# Patient Record
Sex: FEMALE | Race: BLACK OR AFRICAN AMERICAN | NOT HISPANIC OR LATINO | Employment: OTHER | RURAL
[De-identification: names, ages, dates, MRNs, and addresses within clinical notes are randomized per-mention and may not be internally consistent; named-entity substitution may affect disease eponyms.]

---

## 2018-10-03 ENCOUNTER — HISTORICAL (OUTPATIENT)
Dept: ADMINISTRATIVE | Facility: HOSPITAL | Age: 53
End: 2018-10-03

## 2018-10-03 LAB — PAP SMEAR: NORMAL

## 2018-10-05 LAB
LAB AP CLINICAL INFORMATION: NORMAL
LAB AP GENERAL CAT - HISTORICAL: NORMAL
LAB AP INTERPRETATION/RESULT - HISTORICAL: NEGATIVE
LAB AP SPECIMEN ADEQUACY - HISTORICAL: NORMAL
LAB AP SPECIMEN SUBMITTED - HISTORICAL: NORMAL

## 2020-06-09 ENCOUNTER — HISTORICAL (OUTPATIENT)
Dept: ADMINISTRATIVE | Facility: HOSPITAL | Age: 55
End: 2020-06-09

## 2020-07-22 ENCOUNTER — HISTORICAL (OUTPATIENT)
Dept: ADMINISTRATIVE | Facility: HOSPITAL | Age: 55
End: 2020-07-22

## 2020-09-03 ENCOUNTER — HISTORICAL (OUTPATIENT)
Dept: ADMINISTRATIVE | Facility: HOSPITAL | Age: 55
End: 2020-09-03

## 2020-09-03 LAB — POTASSIUM SERPL-SCNC: 3.4 MMOL/L (ref 3.5–5.1)

## 2020-10-18 ENCOUNTER — HISTORICAL (OUTPATIENT)
Dept: ADMINISTRATIVE | Facility: HOSPITAL | Age: 55
End: 2020-10-18

## 2020-10-18 LAB
BILIRUB UR QL STRIP: NEGATIVE MG/DL
CLARITY UR: CLEAR
COLOR UR: ABNORMAL
GLUCOSE UR STRIP-MCNC: NEGATIVE MG/DL
KETONES UR STRIP-SCNC: NEGATIVE MG/DL
LEUKOCYTE ESTERASE UR QL STRIP: NEGATIVE LEU/UL
NITRITE UR QL STRIP: NEGATIVE
PH UR STRIP: 6 PH UNITS (ref 5–8)
PROT UR QL STRIP: NEGATIVE MG/DL
RBC # UR STRIP: NEGATIVE ERY/UL
SP GR UR STRIP: 1.01 (ref 1–1.03)
UROBILINOGEN UR STRIP-ACNC: 0.2 EU/DL

## 2020-10-27 ENCOUNTER — HISTORICAL (OUTPATIENT)
Dept: ADMINISTRATIVE | Facility: HOSPITAL | Age: 55
End: 2020-10-27

## 2020-10-27 LAB
ALBUMIN SERPL BCP-MCNC: 4.1 G/DL (ref 3.5–5)
ALBUMIN/GLOB SERPL: 0.9 {RATIO}
ALP SERPL-CCNC: 105 U/L (ref 41–108)
ALT SERPL W P-5'-P-CCNC: 26 U/L (ref 13–56)
AMYLASE SERPL-CCNC: 72 U/L (ref 25–115)
ANION GAP SERPL CALCULATED.3IONS-SCNC: 9 MMOL/L
AST SERPL W P-5'-P-CCNC: 22 U/L (ref 15–37)
BACTERIA #/AREA URNS HPF: ABNORMAL /HPF
BASOPHILS # BLD AUTO: 0.06 X10E3/UL (ref 0–0.2)
BASOPHILS NFR BLD AUTO: 0.6 % (ref 0–1)
BILIRUB SERPL-MCNC: 0.6 MG/DL (ref 0–1.2)
BILIRUB UR QL STRIP: NEGATIVE MG/DL
BUN SERPL-MCNC: 11 MG/DL (ref 7–18)
BUN/CREAT SERPL: 8.5
CALCIUM SERPL-MCNC: 9.8 MG/DL (ref 8.5–10.1)
CHLORIDE SERPL-SCNC: 99 MMOL/L (ref 98–107)
CLARITY UR: CLEAR
CLARITY UR: CLEAR
CO2 SERPL-SCNC: 36 MMOL/L (ref 21–32)
COLOR UR: ABNORMAL
COLOR UR: ABNORMAL
CREAT SERPL-MCNC: 1.29 MG/DL (ref 0.55–1.02)
EOSINOPHIL # BLD AUTO: 0.02 X10E3/UL (ref 0–0.5)
EOSINOPHIL NFR BLD AUTO: 0.2 % (ref 1–4)
ERYTHROCYTE [DISTWIDTH] IN BLOOD BY AUTOMATED COUNT: 13.9 % (ref 11.5–14.5)
GLOBULIN SER-MCNC: 4.5 G/DL (ref 2–4)
GLUCOSE SERPL-MCNC: 112 MG/DL (ref 74–106)
GLUCOSE UR STRIP-MCNC: NEGATIVE MG/DL
HCT VFR BLD AUTO: 42.6 % (ref 38–47)
HGB BLD-MCNC: 14.4 G/DL (ref 12–16)
IMM GRANULOCYTES # BLD AUTO: 0.01 X10E3/UL (ref 0–0.04)
IMM GRANULOCYTES NFR BLD: 0.1 % (ref 0–0.4)
KETONES UR STRIP-SCNC: NEGATIVE MG/DL
LEUKOCYTE ESTERASE UR QL STRIP: ABNORMAL LEU/UL
LIPASE SERPL-CCNC: 45 U/L (ref 73–393)
LYMPHOCYTES # BLD AUTO: 4.12 X10E3/UL (ref 1–4.8)
LYMPHOCYTES NFR BLD AUTO: 40.3 % (ref 27–41)
MCH RBC QN AUTO: 29.7 PG (ref 27–31)
MCHC RBC AUTO-ENTMCNC: 33.8 G/DL (ref 32–36)
MCV RBC AUTO: 87.8 FL (ref 80–96)
MONOCYTES # BLD AUTO: 0.52 X10E3/UL (ref 0–0.8)
MONOCYTES NFR BLD AUTO: 5.1 % (ref 2–6)
MPC BLD CALC-MCNC: 10 FL (ref 9.4–12.4)
MUCOUS THREADS #/AREA URNS HPF: ABNORMAL /HPF
NEUTROPHILS # BLD AUTO: 5.5 X10E3/UL (ref 1.8–7.7)
NEUTROPHILS NFR BLD AUTO: 53.7 % (ref 53–65)
NITRITE UR QL STRIP: NEGATIVE
NRBC # BLD AUTO: 0 X10E3/UL (ref 0–0)
NRBC, AUTO (.00): 0 /100 (ref 0–0)
PH UR STRIP: 7.5 PH UNITS (ref 5–8)
PLATELET # BLD AUTO: 409 X10E3/UL (ref 150–400)
POTASSIUM SERPL-SCNC: 3.5 MMOL/L (ref 3.5–5.1)
PROT SERPL-MCNC: 8.6 G/DL (ref 6.4–8.2)
PROT UR QL STRIP: NEGATIVE MG/DL
RBC # BLD AUTO: 4.85 X10E6/UL (ref 4.2–5.4)
RBC # UR STRIP: NEGATIVE ERY/UL
RBC #/AREA URNS HPF: ABNORMAL /HPF (ref 0–3)
SODIUM SERPL-SCNC: 140 MMOL/L (ref 136–145)
SP GR UR STRIP: 1.01 (ref 1–1.03)
SQUAMOUS #/AREA URNS LPF: ABNORMAL /LPF
UROBILINOGEN UR STRIP-ACNC: 0.2 EU/DL
WBC # BLD AUTO: 10.23 X10E3/UL (ref 4.5–11)
WBC #/AREA URNS HPF: ABNORMAL /HPF (ref 0–5)

## 2020-12-18 ENCOUNTER — HISTORICAL (OUTPATIENT)
Dept: ADMINISTRATIVE | Facility: HOSPITAL | Age: 55
End: 2020-12-18

## 2020-12-23 LAB
INSULIN SERPL-ACNC: NORMAL U[IU]/ML
LAB AP CLINICAL INFORMATION: NORMAL
LAB AP GENERAL CAT - HISTORICAL: NORMAL
LAB AP INTERPRETATION/RESULT - HISTORICAL: NEGATIVE
LAB AP SPECIMEN ADEQUACY - HISTORICAL: NORMAL
LAB AP SPECIMEN SUBMITTED - HISTORICAL: NORMAL

## 2021-03-09 ENCOUNTER — HISTORICAL (OUTPATIENT)
Dept: ADMINISTRATIVE | Facility: HOSPITAL | Age: 56
End: 2021-03-09

## 2021-05-10 DIAGNOSIS — Z11.59 SPECIAL SCREENING EXAMINATION FOR VIRAL DISEASE: Primary | ICD-10-CM

## 2021-05-10 DIAGNOSIS — Z12.11 COLON CANCER SCREENING: Primary | ICD-10-CM

## 2021-05-21 ENCOUNTER — ANESTHESIA (OUTPATIENT)
Dept: GASTROENTEROLOGY | Facility: HOSPITAL | Age: 56
End: 2021-05-21
Payer: COMMERCIAL

## 2021-05-21 ENCOUNTER — HOSPITAL ENCOUNTER (OUTPATIENT)
Dept: GASTROENTEROLOGY | Facility: HOSPITAL | Age: 56
Discharge: HOME | End: 2021-05-21
Attending: INTERNAL MEDICINE
Payer: COMMERCIAL

## 2021-05-21 ENCOUNTER — ANESTHESIA EVENT (OUTPATIENT)
Dept: GASTROENTEROLOGY | Facility: HOSPITAL | Age: 56
End: 2021-05-21
Payer: COMMERCIAL

## 2021-05-21 VITALS
RESPIRATION RATE: 14 BRPM | BODY MASS INDEX: 32.18 KG/M2 | SYSTOLIC BLOOD PRESSURE: 145 MMHG | OXYGEN SATURATION: 100 % | HEART RATE: 74 BPM | TEMPERATURE: 97 F | WEIGHT: 205 LBS | DIASTOLIC BLOOD PRESSURE: 90 MMHG | HEIGHT: 67 IN

## 2021-05-21 DIAGNOSIS — Z12.11 SCREENING FOR MALIGNANT NEOPLASM OF COLON: ICD-10-CM

## 2021-05-21 DIAGNOSIS — Z12.11 COLON CANCER SCREENING: ICD-10-CM

## 2021-05-21 DIAGNOSIS — K63.5 POLYP OF DESCENDING COLON, UNSPECIFIED TYPE: ICD-10-CM

## 2021-05-21 DIAGNOSIS — K62.1 RECTAL POLYP: ICD-10-CM

## 2021-05-21 PROCEDURE — 63600175 PHARM REV CODE 636 W HCPCS: Performed by: NURSE ANESTHETIST, CERTIFIED REGISTERED

## 2021-05-21 PROCEDURE — 27000284 HC CANNULA NASAL: Performed by: NURSE ANESTHETIST, CERTIFIED REGISTERED

## 2021-05-21 PROCEDURE — 45385 COLONOSCOPY W/LESION REMOVAL: CPT | Mod: PT

## 2021-05-21 PROCEDURE — 27201423 OPTIME MED/SURG SUP & DEVICES STERILE SUPPLY

## 2021-05-21 PROCEDURE — 88305 TISSUE EXAM BY PATHOLOGIST: CPT | Mod: 26,,, | Performed by: PATHOLOGY

## 2021-05-21 PROCEDURE — 88305 TISSUE EXAM BY PATHOLOGIST: CPT | Mod: SUR | Performed by: INTERNAL MEDICINE

## 2021-05-21 PROCEDURE — 37000009 HC ANESTHESIA EA ADD 15 MINS

## 2021-05-21 PROCEDURE — D9220A PRA ANESTHESIA: ICD-10-PCS | Mod: PT,,, | Performed by: NURSE ANESTHETIST, CERTIFIED REGISTERED

## 2021-05-21 PROCEDURE — 37000008 HC ANESTHESIA 1ST 15 MINUTES

## 2021-05-21 PROCEDURE — 25000003 PHARM REV CODE 250: Performed by: NURSE ANESTHETIST, CERTIFIED REGISTERED

## 2021-05-21 PROCEDURE — D9220A PRA ANESTHESIA: Mod: PT,,, | Performed by: NURSE ANESTHETIST, CERTIFIED REGISTERED

## 2021-05-21 PROCEDURE — 88305 SURGICAL PATHOLOGY: ICD-10-PCS | Mod: 26,,, | Performed by: PATHOLOGY

## 2021-05-21 RX ORDER — PHENYLEPHRINE HYDROCHLORIDE 10 MG/ML
INJECTION INTRAVENOUS
Status: DISCONTINUED | OUTPATIENT
Start: 2021-05-21 | End: 2021-05-21

## 2021-05-21 RX ORDER — LIDOCAINE HYDROCHLORIDE 20 MG/ML
INJECTION, SOLUTION EPIDURAL; INFILTRATION; INTRACAUDAL; PERINEURAL
Status: DISCONTINUED | OUTPATIENT
Start: 2021-05-21 | End: 2021-05-21

## 2021-05-21 RX ORDER — PROPOFOL 10 MG/ML
INJECTION, EMULSION INTRAVENOUS
Status: DISCONTINUED | OUTPATIENT
Start: 2021-05-21 | End: 2021-05-21

## 2021-05-21 RX ORDER — SODIUM CHLORIDE 0.9 % (FLUSH) 0.9 %
10 SYRINGE (ML) INJECTION
Status: DISCONTINUED | OUTPATIENT
Start: 2021-05-21 | End: 2021-05-22 | Stop reason: HOSPADM

## 2021-05-21 RX ORDER — LISINOPRIL 20 MG/1
20 TABLET ORAL DAILY
COMMUNITY
End: 2022-01-14

## 2021-05-21 RX ORDER — ARIPIPRAZOLE 10 MG/1
10 TABLET ORAL DAILY
COMMUNITY
End: 2022-03-07 | Stop reason: DRUGHIGH

## 2021-05-21 RX ORDER — BUSPIRONE HYDROCHLORIDE 15 MG/1
15 TABLET ORAL 2 TIMES DAILY
COMMUNITY
End: 2022-03-07 | Stop reason: DRUGHIGH

## 2021-05-21 RX ORDER — LEVOTHYROXINE SODIUM 75 UG/1
75 TABLET ORAL
COMMUNITY
End: 2021-05-26 | Stop reason: DRUGHIGH

## 2021-05-21 RX ADMIN — PHENYLEPHRINE HYDROCHLORIDE 100 MCG: 10 INJECTION INTRAVENOUS at 12:05

## 2021-05-21 RX ADMIN — PROPOFOL 50 MG: 10 INJECTION, EMULSION INTRAVENOUS at 12:05

## 2021-05-21 RX ADMIN — SODIUM CHLORIDE: 9 INJECTION, SOLUTION INTRAVENOUS at 12:05

## 2021-05-21 RX ADMIN — LIDOCAINE HYDROCHLORIDE 100 MG: 20 INJECTION, SOLUTION INTRAVENOUS at 12:05

## 2021-05-24 ENCOUNTER — OFFICE VISIT (OUTPATIENT)
Dept: FAMILY MEDICINE | Facility: CLINIC | Age: 56
End: 2021-05-24
Payer: MEDICARE

## 2021-05-24 VITALS
WEIGHT: 213 LBS | HEIGHT: 67 IN | HEART RATE: 64 BPM | RESPIRATION RATE: 18 BRPM | SYSTOLIC BLOOD PRESSURE: 108 MMHG | DIASTOLIC BLOOD PRESSURE: 69 MMHG | TEMPERATURE: 98 F | OXYGEN SATURATION: 100 % | BODY MASS INDEX: 33.43 KG/M2

## 2021-05-24 DIAGNOSIS — R23.2 HOT FLASHES: Primary | ICD-10-CM

## 2021-05-24 DIAGNOSIS — E03.9 HYPOTHYROIDISM, UNSPECIFIED TYPE: ICD-10-CM

## 2021-05-24 LAB
ESTROGEN SERPL-MCNC: NORMAL PG/ML
LAB AP GROSS DESCRIPTION: NORMAL
LAB AP LABORATORY NOTES: NORMAL
T3RU NFR SERPL: NORMAL %
TSH SERPL DL<=0.005 MIU/L-ACNC: 5.87 UIU/ML (ref 0.36–3.74)

## 2021-05-24 PROCEDURE — 84443 ASSAY THYROID STIM HORMONE: CPT | Mod: GZ,,, | Performed by: CLINICAL MEDICAL LABORATORY

## 2021-05-24 PROCEDURE — 3008F BODY MASS INDEX DOCD: CPT | Mod: ,,, | Performed by: FAMILY MEDICINE

## 2021-05-24 PROCEDURE — 3008F PR BODY MASS INDEX (BMI) DOCUMENTED: ICD-10-PCS | Mod: ,,, | Performed by: FAMILY MEDICINE

## 2021-05-24 PROCEDURE — 84443 TSH: ICD-10-PCS | Mod: GZ,,, | Performed by: CLINICAL MEDICAL LABORATORY

## 2021-05-24 PROCEDURE — 99213 PR OFFICE/OUTPT VISIT, EST, LEVL III, 20-29 MIN: ICD-10-PCS | Mod: ,,, | Performed by: FAMILY MEDICINE

## 2021-05-24 PROCEDURE — 99213 OFFICE O/P EST LOW 20 MIN: CPT | Mod: ,,, | Performed by: FAMILY MEDICINE

## 2021-05-24 RX ORDER — AMITRIPTYLINE HYDROCHLORIDE 50 MG/1
50 TABLET, FILM COATED ORAL NIGHTLY
COMMUNITY
Start: 2021-04-13 | End: 2022-03-07 | Stop reason: DRUGHIGH

## 2021-05-24 RX ORDER — LISINOPRIL AND HYDROCHLOROTHIAZIDE 20; 25 MG/1; MG/1
1 TABLET ORAL DAILY
COMMUNITY
Start: 2021-04-08 | End: 2022-01-14 | Stop reason: SDUPTHER

## 2021-05-24 RX ORDER — TRAZODONE HYDROCHLORIDE 100 MG/1
2 TABLET ORAL NIGHTLY PRN
COMMUNITY
End: 2022-03-22 | Stop reason: SDUPTHER

## 2021-05-25 PROBLEM — E03.9 HYPOTHYROIDISM: Status: ACTIVE | Noted: 2021-05-25

## 2021-05-25 PROBLEM — I10 ESSENTIAL HYPERTENSION: Status: ACTIVE | Noted: 2021-05-25

## 2021-05-26 ENCOUNTER — TELEPHONE (OUTPATIENT)
Dept: GASTROENTEROLOGY | Facility: CLINIC | Age: 56
End: 2021-05-26

## 2021-05-26 DIAGNOSIS — E03.9 HYPOTHYROIDISM, UNSPECIFIED TYPE: Primary | ICD-10-CM

## 2021-05-26 RX ORDER — LEVOTHYROXINE SODIUM 88 UG/1
88 TABLET ORAL
Qty: 30 TABLET | Refills: 11 | Status: SHIPPED | OUTPATIENT
Start: 2021-05-26 | End: 2021-09-16 | Stop reason: SDUPTHER

## 2021-07-01 ENCOUNTER — HOSPITAL ENCOUNTER (OUTPATIENT)
Dept: RADIOLOGY | Facility: HOSPITAL | Age: 56
Discharge: HOME | End: 2021-07-01
Attending: FAMILY MEDICINE
Payer: MEDICARE

## 2021-07-01 ENCOUNTER — OFFICE VISIT (OUTPATIENT)
Dept: FAMILY MEDICINE | Facility: CLINIC | Age: 56
End: 2021-07-01
Payer: COMMERCIAL

## 2021-07-01 VITALS
SYSTOLIC BLOOD PRESSURE: 131 MMHG | DIASTOLIC BLOOD PRESSURE: 89 MMHG | RESPIRATION RATE: 16 BRPM | TEMPERATURE: 97 F | HEART RATE: 96 BPM | WEIGHT: 204 LBS | BODY MASS INDEX: 33.99 KG/M2 | HEIGHT: 65 IN

## 2021-07-01 DIAGNOSIS — M15.9 OSTEOARTHRITIS OF MULTIPLE JOINTS, UNSPECIFIED OSTEOARTHRITIS TYPE: ICD-10-CM

## 2021-07-01 DIAGNOSIS — M54.9 DORSALGIA, UNSPECIFIED: ICD-10-CM

## 2021-07-01 DIAGNOSIS — M54.9 DORSALGIA, UNSPECIFIED: Primary | ICD-10-CM

## 2021-07-01 PROBLEM — G47.00 INSOMNIA: Status: ACTIVE | Noted: 2021-07-01

## 2021-07-01 PROCEDURE — 3008F PR BODY MASS INDEX (BMI) DOCUMENTED: ICD-10-PCS | Mod: CPTII,,, | Performed by: FAMILY MEDICINE

## 2021-07-01 PROCEDURE — 1125F PR PAIN SEVERITY QUANTIFIED, PAIN PRESENT: ICD-10-PCS | Mod: ,,, | Performed by: FAMILY MEDICINE

## 2021-07-01 PROCEDURE — 3008F BODY MASS INDEX DOCD: CPT | Mod: CPTII,,, | Performed by: FAMILY MEDICINE

## 2021-07-01 PROCEDURE — 99213 OFFICE O/P EST LOW 20 MIN: CPT | Mod: 25,,, | Performed by: FAMILY MEDICINE

## 2021-07-01 PROCEDURE — 72110 X-RAY EXAM L-2 SPINE 4/>VWS: CPT | Mod: TC

## 2021-07-01 PROCEDURE — 99213 PR OFFICE/OUTPT VISIT, EST, LEVL III, 20-29 MIN: ICD-10-PCS | Mod: 25,,, | Performed by: FAMILY MEDICINE

## 2021-07-01 PROCEDURE — 96372 THER/PROPH/DIAG INJ SC/IM: CPT | Mod: ,,, | Performed by: FAMILY MEDICINE

## 2021-07-01 PROCEDURE — 1125F AMNT PAIN NOTED PAIN PRSNT: CPT | Mod: ,,, | Performed by: FAMILY MEDICINE

## 2021-07-01 PROCEDURE — 96372 PR INJECTION,THERAP/PROPH/DIAG2ST, IM OR SUBCUT: ICD-10-PCS | Mod: ,,, | Performed by: FAMILY MEDICINE

## 2021-07-01 RX ORDER — NAPROXEN 500 MG/1
500 TABLET ORAL 2 TIMES DAILY
Qty: 60 TABLET | Refills: 1 | Status: SHIPPED | OUTPATIENT
Start: 2021-07-01 | End: 2021-09-16 | Stop reason: SDUPTHER

## 2021-07-01 RX ORDER — CYCLOBENZAPRINE HCL 10 MG
10 TABLET ORAL 3 TIMES DAILY PRN
Qty: 60 TABLET | Refills: 1 | Status: SHIPPED | OUTPATIENT
Start: 2021-07-01 | End: 2021-07-11

## 2021-07-01 RX ORDER — DEXAMETHASONE SODIUM PHOSPHATE 4 MG/ML
4 INJECTION, SOLUTION INTRA-ARTICULAR; INTRALESIONAL; INTRAMUSCULAR; INTRAVENOUS; SOFT TISSUE
Status: COMPLETED | OUTPATIENT
Start: 2021-07-01 | End: 2021-07-01

## 2021-07-01 RX ORDER — METHYLPREDNISOLONE ACETATE 40 MG/ML
40 INJECTION, SUSPENSION INTRA-ARTICULAR; INTRALESIONAL; INTRAMUSCULAR; SOFT TISSUE
Status: COMPLETED | OUTPATIENT
Start: 2021-07-01 | End: 2021-07-01

## 2021-07-01 RX ADMIN — DEXAMETHASONE SODIUM PHOSPHATE 4 MG: 4 INJECTION, SOLUTION INTRA-ARTICULAR; INTRALESIONAL; INTRAMUSCULAR; INTRAVENOUS; SOFT TISSUE at 03:07

## 2021-07-01 RX ADMIN — METHYLPREDNISOLONE ACETATE 40 MG: 40 INJECTION, SUSPENSION INTRA-ARTICULAR; INTRALESIONAL; INTRAMUSCULAR; SOFT TISSUE at 03:07

## 2021-07-04 PROBLEM — M15.9 OSTEOARTHRITIS OF MULTIPLE JOINTS: Status: ACTIVE | Noted: 2021-07-04

## 2021-07-04 PROBLEM — F41.9 ANXIETY: Status: ACTIVE | Noted: 2021-07-04

## 2021-08-26 ENCOUNTER — OFFICE VISIT (OUTPATIENT)
Dept: OBSTETRICS AND GYNECOLOGY | Facility: CLINIC | Age: 56
End: 2021-08-26
Payer: MEDICARE

## 2021-08-26 VITALS
RESPIRATION RATE: 19 BRPM | HEIGHT: 67 IN | WEIGHT: 216 LBS | DIASTOLIC BLOOD PRESSURE: 94 MMHG | BODY MASS INDEX: 33.9 KG/M2 | SYSTOLIC BLOOD PRESSURE: 152 MMHG | HEART RATE: 75 BPM

## 2021-08-26 DIAGNOSIS — R61 NIGHT SWEATS: Primary | ICD-10-CM

## 2021-08-26 DIAGNOSIS — R23.2 HOT FLASHES: ICD-10-CM

## 2021-08-26 PROCEDURE — 1159F MED LIST DOCD IN RCRD: CPT | Mod: CPTII,,, | Performed by: OBSTETRICS & GYNECOLOGY

## 2021-08-26 PROCEDURE — 99214 OFFICE O/P EST MOD 30 MIN: CPT | Mod: ,,, | Performed by: OBSTETRICS & GYNECOLOGY

## 2021-08-26 PROCEDURE — 1159F PR MEDICATION LIST DOCUMENTED IN MEDICAL RECORD: ICD-10-PCS | Mod: CPTII,,, | Performed by: OBSTETRICS & GYNECOLOGY

## 2021-08-26 PROCEDURE — 3080F PR MOST RECENT DIASTOLIC BLOOD PRESSURE >= 90 MM HG: ICD-10-PCS | Mod: CPTII,,, | Performed by: OBSTETRICS & GYNECOLOGY

## 2021-08-26 PROCEDURE — 3008F PR BODY MASS INDEX (BMI) DOCUMENTED: ICD-10-PCS | Mod: CPTII,,, | Performed by: OBSTETRICS & GYNECOLOGY

## 2021-08-26 PROCEDURE — 1160F RVW MEDS BY RX/DR IN RCRD: CPT | Mod: CPTII,,, | Performed by: OBSTETRICS & GYNECOLOGY

## 2021-08-26 PROCEDURE — 4010F PR ACE/ARB THEARPY RXD/TAKEN: ICD-10-PCS | Mod: CPTII,,, | Performed by: OBSTETRICS & GYNECOLOGY

## 2021-08-26 PROCEDURE — 99214 PR OFFICE/OUTPT VISIT, EST, LEVL IV, 30-39 MIN: ICD-10-PCS | Mod: ,,, | Performed by: OBSTETRICS & GYNECOLOGY

## 2021-08-26 PROCEDURE — 3008F BODY MASS INDEX DOCD: CPT | Mod: CPTII,,, | Performed by: OBSTETRICS & GYNECOLOGY

## 2021-08-26 PROCEDURE — 3077F SYST BP >= 140 MM HG: CPT | Mod: CPTII,,, | Performed by: OBSTETRICS & GYNECOLOGY

## 2021-08-26 PROCEDURE — 3080F DIAST BP >= 90 MM HG: CPT | Mod: CPTII,,, | Performed by: OBSTETRICS & GYNECOLOGY

## 2021-08-26 PROCEDURE — 1160F PR REVIEW ALL MEDS BY PRESCRIBER/CLIN PHARMACIST DOCUMENTED: ICD-10-PCS | Mod: CPTII,,, | Performed by: OBSTETRICS & GYNECOLOGY

## 2021-08-26 PROCEDURE — 4010F ACE/ARB THERAPY RXD/TAKEN: CPT | Mod: CPTII,,, | Performed by: OBSTETRICS & GYNECOLOGY

## 2021-08-26 PROCEDURE — 3077F PR MOST RECENT SYSTOLIC BLOOD PRESSURE >= 140 MM HG: ICD-10-PCS | Mod: CPTII,,, | Performed by: OBSTETRICS & GYNECOLOGY

## 2021-08-26 RX ORDER — PAROXETINE 7.5 MG/1
7.5 CAPSULE ORAL DAILY
Qty: 30 CAPSULE | Refills: 11 | Status: SHIPPED | OUTPATIENT
Start: 2021-08-26 | End: 2022-01-14

## 2021-08-30 ENCOUNTER — TELEPHONE (OUTPATIENT)
Dept: OBSTETRICS AND GYNECOLOGY | Facility: CLINIC | Age: 56
End: 2021-08-30

## 2021-09-09 ENCOUNTER — TELEPHONE (OUTPATIENT)
Dept: OBSTETRICS AND GYNECOLOGY | Facility: CLINIC | Age: 56
End: 2021-09-09

## 2021-09-16 ENCOUNTER — OFFICE VISIT (OUTPATIENT)
Dept: FAMILY MEDICINE | Facility: CLINIC | Age: 56
End: 2021-09-16
Payer: MEDICARE

## 2021-09-16 ENCOUNTER — HOSPITAL ENCOUNTER (OUTPATIENT)
Dept: RADIOLOGY | Facility: HOSPITAL | Age: 56
Discharge: HOME | End: 2021-09-16
Attending: FAMILY MEDICINE
Payer: MEDICARE

## 2021-09-16 VITALS
TEMPERATURE: 98 F | BODY MASS INDEX: 35.66 KG/M2 | RESPIRATION RATE: 18 BRPM | DIASTOLIC BLOOD PRESSURE: 103 MMHG | OXYGEN SATURATION: 98 % | SYSTOLIC BLOOD PRESSURE: 171 MMHG | HEIGHT: 67 IN | WEIGHT: 227.19 LBS | HEART RATE: 83 BPM

## 2021-09-16 DIAGNOSIS — R06.02 SHORTNESS OF BREATH: ICD-10-CM

## 2021-09-16 DIAGNOSIS — M54.9 DORSALGIA, UNSPECIFIED: ICD-10-CM

## 2021-09-16 DIAGNOSIS — I10 ESSENTIAL HYPERTENSION: ICD-10-CM

## 2021-09-16 DIAGNOSIS — E03.9 HYPOTHYROIDISM, UNSPECIFIED TYPE: Primary | ICD-10-CM

## 2021-09-16 PROCEDURE — 1159F PR MEDICATION LIST DOCUMENTED IN MEDICAL RECORD: ICD-10-PCS | Mod: ,,, | Performed by: FAMILY MEDICINE

## 2021-09-16 PROCEDURE — 99214 PR OFFICE/OUTPT VISIT, EST, LEVL IV, 30-39 MIN: ICD-10-PCS | Mod: 25,,, | Performed by: FAMILY MEDICINE

## 2021-09-16 PROCEDURE — 3077F PR MOST RECENT SYSTOLIC BLOOD PRESSURE >= 140 MM HG: ICD-10-PCS | Mod: ,,, | Performed by: FAMILY MEDICINE

## 2021-09-16 PROCEDURE — G0008 ADMIN INFLUENZA VIRUS VAC: HCPCS | Mod: ,,, | Performed by: FAMILY MEDICINE

## 2021-09-16 PROCEDURE — 4010F ACE/ARB THERAPY RXD/TAKEN: CPT | Mod: ,,, | Performed by: FAMILY MEDICINE

## 2021-09-16 PROCEDURE — 3077F SYST BP >= 140 MM HG: CPT | Mod: ,,, | Performed by: FAMILY MEDICINE

## 2021-09-16 PROCEDURE — 3080F PR MOST RECENT DIASTOLIC BLOOD PRESSURE >= 90 MM HG: ICD-10-PCS | Mod: ,,, | Performed by: FAMILY MEDICINE

## 2021-09-16 PROCEDURE — 99214 OFFICE O/P EST MOD 30 MIN: CPT | Mod: 25,,, | Performed by: FAMILY MEDICINE

## 2021-09-16 PROCEDURE — 4010F PR ACE/ARB THEARPY RXD/TAKEN: ICD-10-PCS | Mod: ,,, | Performed by: FAMILY MEDICINE

## 2021-09-16 PROCEDURE — 90686 IIV4 VACC NO PRSV 0.5 ML IM: CPT | Mod: ,,, | Performed by: FAMILY MEDICINE

## 2021-09-16 PROCEDURE — G0008 FLU VACCINE (QUAD) GREATER THAN OR EQUAL TO 3YO PRESERVATIVE FREE IM: ICD-10-PCS | Mod: ,,, | Performed by: FAMILY MEDICINE

## 2021-09-16 PROCEDURE — 90686 FLU VACCINE (QUAD) GREATER THAN OR EQUAL TO 3YO PRESERVATIVE FREE IM: ICD-10-PCS | Mod: ,,, | Performed by: FAMILY MEDICINE

## 2021-09-16 PROCEDURE — 3008F BODY MASS INDEX DOCD: CPT | Mod: ,,, | Performed by: FAMILY MEDICINE

## 2021-09-16 PROCEDURE — 71046 X-RAY EXAM CHEST 2 VIEWS: CPT | Mod: TC

## 2021-09-16 PROCEDURE — 1159F MED LIST DOCD IN RCRD: CPT | Mod: ,,, | Performed by: FAMILY MEDICINE

## 2021-09-16 PROCEDURE — 3008F PR BODY MASS INDEX (BMI) DOCUMENTED: ICD-10-PCS | Mod: ,,, | Performed by: FAMILY MEDICINE

## 2021-09-16 PROCEDURE — 3080F DIAST BP >= 90 MM HG: CPT | Mod: ,,, | Performed by: FAMILY MEDICINE

## 2021-09-16 RX ORDER — HYDRALAZINE HYDROCHLORIDE 25 MG/1
25 TABLET, FILM COATED ORAL 3 TIMES DAILY
COMMUNITY
End: 2022-07-14 | Stop reason: SDUPTHER

## 2021-09-16 RX ORDER — NAPROXEN 500 MG/1
500 TABLET ORAL 2 TIMES DAILY
Qty: 60 TABLET | Refills: 5 | Status: SHIPPED | OUTPATIENT
Start: 2021-09-16 | End: 2022-01-14 | Stop reason: SDUPTHER

## 2021-09-16 RX ORDER — LISINOPRIL 40 MG/1
40 TABLET ORAL DAILY
COMMUNITY
End: 2022-01-14

## 2021-09-16 RX ORDER — AMLODIPINE BESYLATE 5 MG/1
5 TABLET ORAL DAILY
Qty: 30 TABLET | Refills: 2 | Status: SHIPPED | OUTPATIENT
Start: 2021-09-16 | End: 2022-01-14

## 2021-09-16 RX ORDER — LEVOTHYROXINE SODIUM 88 UG/1
88 TABLET ORAL
Qty: 30 TABLET | Refills: 5 | Status: SHIPPED | OUTPATIENT
Start: 2021-09-16 | End: 2022-04-28 | Stop reason: SDUPTHER

## 2021-09-27 ENCOUNTER — CLINICAL SUPPORT (OUTPATIENT)
Dept: PULMONOLOGY | Facility: HOSPITAL | Age: 56
End: 2021-09-27
Attending: FAMILY MEDICINE
Payer: MEDICARE

## 2021-09-27 DIAGNOSIS — R06.02 SHORTNESS OF BREATH: ICD-10-CM

## 2021-09-27 PROCEDURE — 94060 EVALUATION OF WHEEZING: CPT | Mod: 26,,, | Performed by: INTERNAL MEDICINE

## 2021-09-27 PROCEDURE — 94060 EVALUATION OF WHEEZING: CPT

## 2021-09-27 PROCEDURE — 94727 GAS DIL/WSHOT DETER LNG VOL: CPT

## 2021-09-27 PROCEDURE — 94726 PLETHYSMOGRAPHY LUNG VOLUMES: CPT

## 2021-09-27 PROCEDURE — 94729 DIFFUSING CAPACITY: CPT | Mod: 26,,, | Performed by: INTERNAL MEDICINE

## 2021-09-27 PROCEDURE — 94729 PR C02/MEMBANE DIFFUSE CAPACITY: ICD-10-PCS | Mod: 26,,, | Performed by: INTERNAL MEDICINE

## 2021-09-27 PROCEDURE — 94060 PR EVAL OF BRONCHOSPASM: ICD-10-PCS | Mod: 26,,, | Performed by: INTERNAL MEDICINE

## 2021-09-27 PROCEDURE — 94727 GAS DIL/WSHOT DETER LNG VOL: CPT | Mod: 26,,, | Performed by: INTERNAL MEDICINE

## 2021-09-27 PROCEDURE — 94729 DIFFUSING CAPACITY: CPT

## 2021-09-27 PROCEDURE — 94727 PR PULM FUNCTION TEST BY GAS: ICD-10-PCS | Mod: 26,,, | Performed by: INTERNAL MEDICINE

## 2022-01-14 ENCOUNTER — OFFICE VISIT (OUTPATIENT)
Dept: FAMILY MEDICINE | Facility: CLINIC | Age: 57
End: 2022-01-14
Payer: MEDICARE

## 2022-01-14 VITALS
RESPIRATION RATE: 18 BRPM | BODY MASS INDEX: 35.41 KG/M2 | SYSTOLIC BLOOD PRESSURE: 124 MMHG | WEIGHT: 225.63 LBS | HEIGHT: 67 IN | TEMPERATURE: 98 F | HEART RATE: 89 BPM | DIASTOLIC BLOOD PRESSURE: 83 MMHG | OXYGEN SATURATION: 98 %

## 2022-01-14 DIAGNOSIS — M54.9 DORSALGIA, UNSPECIFIED: ICD-10-CM

## 2022-01-14 DIAGNOSIS — I10 ESSENTIAL HYPERTENSION: Primary | ICD-10-CM

## 2022-01-14 DIAGNOSIS — R25.2 MUSCLE CRAMPS: ICD-10-CM

## 2022-01-14 DIAGNOSIS — E03.9 HYPOTHYROIDISM, UNSPECIFIED TYPE: ICD-10-CM

## 2022-01-14 LAB
ALBUMIN SERPL BCP-MCNC: 4.1 G/DL (ref 3.5–5)
ALBUMIN/GLOB SERPL: 0.8 {RATIO}
ALP SERPL-CCNC: 105 U/L (ref 41–108)
ALT SERPL W P-5'-P-CCNC: 36 U/L (ref 13–56)
ANION GAP SERPL CALCULATED.3IONS-SCNC: 8 MMOL/L (ref 7–16)
AST SERPL W P-5'-P-CCNC: 33 U/L (ref 15–37)
BASOPHILS # BLD AUTO: 0.06 K/UL (ref 0–0.2)
BASOPHILS NFR BLD AUTO: 0.5 % (ref 0–1)
BILIRUB SERPL-MCNC: 0.6 MG/DL (ref 0–1.2)
BUN SERPL-MCNC: 14 MG/DL (ref 7–18)
BUN/CREAT SERPL: 9 (ref 6–20)
CALCIUM SERPL-MCNC: 9.4 MG/DL (ref 8.5–10.1)
CHLORIDE SERPL-SCNC: 97 MMOL/L (ref 98–107)
CHOLEST SERPL-MCNC: 179 MG/DL (ref 0–200)
CHOLEST/HDLC SERPL: 5 {RATIO}
CO2 SERPL-SCNC: 31 MMOL/L (ref 21–32)
CREAT SERPL-MCNC: 1.57 MG/DL (ref 0.55–1.02)
DIFFERENTIAL METHOD BLD: ABNORMAL
EOSINOPHIL # BLD AUTO: 0.08 K/UL (ref 0–0.5)
EOSINOPHIL NFR BLD AUTO: 0.7 % (ref 1–4)
EOSINOPHIL NFR BLD MANUAL: 1 % (ref 1–4)
ERYTHROCYTE [DISTWIDTH] IN BLOOD BY AUTOMATED COUNT: 13.8 % (ref 11.5–14.5)
GLOBULIN SER-MCNC: 5.1 G/DL (ref 2–4)
GLUCOSE SERPL-MCNC: 88 MG/DL (ref 74–106)
HCT VFR BLD AUTO: 40.8 % (ref 38–47)
HDLC SERPL-MCNC: 36 MG/DL (ref 40–60)
HGB BLD-MCNC: 13.9 G/DL (ref 12–16)
IMM GRANULOCYTES # BLD AUTO: 0.02 K/UL (ref 0–0.04)
IMM GRANULOCYTES NFR BLD: 0.2 % (ref 0–0.4)
LDLC SERPL CALC-MCNC: 118 MG/DL
LDLC/HDLC SERPL: 3.3 {RATIO}
LYMPHOCYTES # BLD AUTO: 5.26 K/UL (ref 1–4.8)
LYMPHOCYTES NFR BLD AUTO: 47.3 % (ref 27–41)
LYMPHOCYTES NFR BLD MANUAL: 48 % (ref 27–41)
MAGNESIUM SERPL-MCNC: 2.2 MG/DL (ref 1.7–2.3)
MCH RBC QN AUTO: 29.6 PG (ref 27–31)
MCHC RBC AUTO-ENTMCNC: 34.1 G/DL (ref 32–36)
MCV RBC AUTO: 87 FL (ref 80–96)
MONOCYTES # BLD AUTO: 0.73 K/UL (ref 0–0.8)
MONOCYTES NFR BLD AUTO: 6.6 % (ref 2–6)
MONOCYTES NFR BLD MANUAL: 7 % (ref 2–6)
MPC BLD CALC-MCNC: 10.2 FL (ref 9.4–12.4)
NEUTROPHILS # BLD AUTO: 4.97 K/UL (ref 1.8–7.7)
NEUTROPHILS NFR BLD AUTO: 44.7 % (ref 53–65)
NEUTS SEG NFR BLD MANUAL: 44 % (ref 50–62)
NONHDLC SERPL-MCNC: 143 MG/DL
NRBC # BLD AUTO: 0 X10E3/UL
NRBC, AUTO (.00): 0 %
PLATELET # BLD AUTO: 408 K/UL (ref 150–400)
PLATELET MORPHOLOGY: NORMAL
POTASSIUM SERPL-SCNC: 3.9 MMOL/L (ref 3.5–5.1)
PROT SERPL-MCNC: 9.2 G/DL (ref 6.4–8.2)
RBC # BLD AUTO: 4.69 M/UL (ref 4.2–5.4)
RBC MORPH BLD: NORMAL
SODIUM SERPL-SCNC: 132 MMOL/L (ref 136–145)
TRIGL SERPL-MCNC: 127 MG/DL (ref 35–150)
TSH SERPL DL<=0.005 MIU/L-ACNC: 3.28 UIU/ML (ref 0.36–3.74)
VLDLC SERPL-MCNC: 25 MG/DL
WBC # BLD AUTO: 11.12 K/UL (ref 4.5–11)

## 2022-01-14 PROCEDURE — 80061 LIPID PANEL: CPT | Mod: ,,, | Performed by: CLINICAL MEDICAL LABORATORY

## 2022-01-14 PROCEDURE — 99214 PR OFFICE/OUTPT VISIT, EST, LEVL IV, 30-39 MIN: ICD-10-PCS | Mod: ,,, | Performed by: FAMILY MEDICINE

## 2022-01-14 PROCEDURE — 85025 CBC WITH DIFFERENTIAL: ICD-10-PCS | Mod: ,,, | Performed by: CLINICAL MEDICAL LABORATORY

## 2022-01-14 PROCEDURE — 3008F BODY MASS INDEX DOCD: CPT | Mod: ,,, | Performed by: FAMILY MEDICINE

## 2022-01-14 PROCEDURE — 3074F PR MOST RECENT SYSTOLIC BLOOD PRESSURE < 130 MM HG: ICD-10-PCS | Mod: ,,, | Performed by: FAMILY MEDICINE

## 2022-01-14 PROCEDURE — 4010F ACE/ARB THERAPY RXD/TAKEN: CPT | Mod: ,,, | Performed by: FAMILY MEDICINE

## 2022-01-14 PROCEDURE — 3079F DIAST BP 80-89 MM HG: CPT | Mod: ,,, | Performed by: FAMILY MEDICINE

## 2022-01-14 PROCEDURE — 83735 ASSAY OF MAGNESIUM: CPT | Mod: ,,, | Performed by: CLINICAL MEDICAL LABORATORY

## 2022-01-14 PROCEDURE — 83735 MAGNESIUM: ICD-10-PCS | Mod: ,,, | Performed by: CLINICAL MEDICAL LABORATORY

## 2022-01-14 PROCEDURE — 1159F PR MEDICATION LIST DOCUMENTED IN MEDICAL RECORD: ICD-10-PCS | Mod: ,,, | Performed by: FAMILY MEDICINE

## 2022-01-14 PROCEDURE — 85025 COMPLETE CBC W/AUTO DIFF WBC: CPT | Mod: ,,, | Performed by: CLINICAL MEDICAL LABORATORY

## 2022-01-14 PROCEDURE — 3008F PR BODY MASS INDEX (BMI) DOCUMENTED: ICD-10-PCS | Mod: ,,, | Performed by: FAMILY MEDICINE

## 2022-01-14 PROCEDURE — 80061 LIPID PANEL: ICD-10-PCS | Mod: ,,, | Performed by: CLINICAL MEDICAL LABORATORY

## 2022-01-14 PROCEDURE — 3079F PR MOST RECENT DIASTOLIC BLOOD PRESSURE 80-89 MM HG: ICD-10-PCS | Mod: ,,, | Performed by: FAMILY MEDICINE

## 2022-01-14 PROCEDURE — 1159F MED LIST DOCD IN RCRD: CPT | Mod: ,,, | Performed by: FAMILY MEDICINE

## 2022-01-14 PROCEDURE — 80053 COMPREHENSIVE METABOLIC PANEL: ICD-10-PCS | Mod: ,,, | Performed by: CLINICAL MEDICAL LABORATORY

## 2022-01-14 PROCEDURE — 3074F SYST BP LT 130 MM HG: CPT | Mod: ,,, | Performed by: FAMILY MEDICINE

## 2022-01-14 PROCEDURE — 84443 ASSAY THYROID STIM HORMONE: CPT | Mod: ,,, | Performed by: CLINICAL MEDICAL LABORATORY

## 2022-01-14 PROCEDURE — 80053 COMPREHEN METABOLIC PANEL: CPT | Mod: ,,, | Performed by: CLINICAL MEDICAL LABORATORY

## 2022-01-14 PROCEDURE — 4010F PR ACE/ARB THEARPY RXD/TAKEN: ICD-10-PCS | Mod: ,,, | Performed by: FAMILY MEDICINE

## 2022-01-14 PROCEDURE — 84443 TSH: ICD-10-PCS | Mod: ,,, | Performed by: CLINICAL MEDICAL LABORATORY

## 2022-01-14 PROCEDURE — 99214 OFFICE O/P EST MOD 30 MIN: CPT | Mod: ,,, | Performed by: FAMILY MEDICINE

## 2022-01-14 RX ORDER — LISINOPRIL 40 MG/1
40 TABLET ORAL DAILY
Qty: 90 TABLET | Refills: 1 | Status: SHIPPED | OUTPATIENT
Start: 2022-01-14 | End: 2022-07-14 | Stop reason: SDUPTHER

## 2022-01-14 RX ORDER — LISINOPRIL 40 MG/1
40 TABLET ORAL DAILY
Qty: 30 TABLET | Refills: 5 | Status: CANCELLED | OUTPATIENT
Start: 2022-01-14

## 2022-01-14 RX ORDER — HYDROCHLOROTHIAZIDE 25 MG/1
25 TABLET ORAL DAILY
COMMUNITY
End: 2022-01-14 | Stop reason: SDUPTHER

## 2022-01-14 RX ORDER — NAPROXEN 500 MG/1
500 TABLET ORAL 2 TIMES DAILY
Qty: 60 TABLET | Refills: 5 | Status: CANCELLED | OUTPATIENT
Start: 2022-01-14

## 2022-01-14 RX ORDER — AMLODIPINE BESYLATE 5 MG/1
5 TABLET ORAL DAILY
Qty: 30 TABLET | Refills: 5 | Status: CANCELLED | OUTPATIENT
Start: 2022-01-14 | End: 2023-01-14

## 2022-01-14 RX ORDER — AMLODIPINE BESYLATE 10 MG/1
10 TABLET ORAL DAILY
COMMUNITY
End: 2022-01-14

## 2022-01-14 RX ORDER — AMLODIPINE BESYLATE 10 MG/1
10 TABLET ORAL DAILY
Qty: 90 TABLET | Refills: 1 | Status: SHIPPED | OUTPATIENT
Start: 2022-01-14 | End: 2022-03-22

## 2022-01-14 RX ORDER — HYDROCHLOROTHIAZIDE 25 MG/1
25 TABLET ORAL DAILY
Qty: 90 TABLET | Refills: 1 | Status: SHIPPED | OUTPATIENT
Start: 2022-01-14 | End: 2022-07-14 | Stop reason: SDUPTHER

## 2022-01-14 RX ORDER — NAPROXEN 500 MG/1
500 TABLET ORAL 2 TIMES DAILY
Qty: 180 TABLET | Refills: 1 | Status: SHIPPED | OUTPATIENT
Start: 2022-01-14 | End: 2022-07-14 | Stop reason: SDUPTHER

## 2022-01-14 NOTE — PROGRESS NOTES
New Clinic Note    Nikkie Brown is a 56 y.o. female     CC:   Chief Complaint   Patient presents with    Follow-up     Patient is here for a general health evaluation. She needs lab work today and refill on some of her medications    Hypothyroidism    Hypertension    Spasms     Patient is also complaining of muscle spasms and cramps which are worse at night.        Subjective    History of Present Illness Patient is here for a general health evaluation and lab work. She is also having a lot of muscle spasms and cramps which are worse at night.         Presents to clinic for follow up on chronic health problems    Hypertension:    Takes medications as directed: yes  Checks blood pressure outside of clinic: yes  On a statin: no  Cardiovascular exercise: no  Heart healthy diet: no        Current Outpatient Medications:     amitriptyline (ELAVIL) 50 MG tablet, Take 50 mg by mouth nightly., Disp: , Rfl:     ARIPiprazole (ABILIFY) 10 MG Tab, Take 10 mg by mouth once daily. , Disp: , Rfl:     busPIRone (BUSPAR) 15 MG tablet, Take 15 mg by mouth 2 (two) times daily., Disp: , Rfl:     levothyroxine (SYNTHROID) 88 MCG tablet, Take 1 tablet (88 mcg total) by mouth before breakfast., Disp: 30 tablet, Rfl: 5    traZODone (DESYREL) 100 MG tablet, Take 2 tablets by mouth nightly as needed., Disp: , Rfl:     amLODIPine (NORVASC) 10 MG tablet, Take 1 tablet (10 mg total) by mouth once daily., Disp: 90 tablet, Rfl: 1    hydrALAZINE (APRESOLINE) 25 MG tablet, Take 25 mg by mouth 3 (three) times daily., Disp: , Rfl:     hydroCHLOROthiazide (HYDRODIURIL) 25 MG tablet, Take 1 tablet (25 mg total) by mouth once daily., Disp: 90 tablet, Rfl: 1    lisinopriL (PRINIVIL,ZESTRIL) 40 MG tablet, Take 1 tablet (40 mg total) by mouth once daily., Disp: 90 tablet, Rfl: 1    naproxen (NAPROSYN) 500 MG tablet, Take 1 tablet (500 mg total) by mouth 2 (two) times daily., Disp: 180 tablet, Rfl: 1     Past Medical History:   Diagnosis  "Date    Anxiety     Bipolar disorder     Hypertension     Hypothyroidism     Polyp of descending colon 5/21/2021    Rectal polyp 5/21/2021    Screening for malignant neoplasm of colon 5/21/2021        Family History   Problem Relation Age of Onset    Hypertension Father     Mental retardation Father     Hypertension Sister     Mental retardation Sister     Stroke Maternal Grandmother     Heart disease Maternal Grandmother         Past Surgical History:   Procedure Laterality Date    ANKLE SURGERY Left 2001    broke ankle has a plate in this ankle    TUBAL LIGATION  1997        Review of Systems   Constitutional: Negative for fatigue and fever.   HENT: Negative for ear pain, postnasal drip, rhinorrhea and sinus pressure/congestion.    Respiratory: Negative for cough and shortness of breath.    Cardiovascular: Negative for chest pain.   Gastrointestinal: Negative for abdominal pain, diarrhea, nausea and vomiting.   Genitourinary: Negative for dysuria.   Musculoskeletal: Positive for myalgias.   Neurological: Negative for headaches.        /83 (BP Location: Left arm, Patient Position: Sitting, BP Method: Large (Manual))   Pulse 89   Temp 97.7 °F (36.5 °C) (Oral)   Resp 18   Ht 5' 7" (1.702 m)   Wt 102.3 kg (225 lb 9.6 oz)   SpO2 98%   BMI 35.33 kg/m²      Physical Exam  HENT:      Head: Normocephalic and atraumatic.      Mouth/Throat:      Pharynx: Oropharynx is clear.   Cardiovascular:      Rate and Rhythm: Normal rate and regular rhythm.   Pulmonary:      Effort: Pulmonary effort is normal.      Breath sounds: Normal breath sounds.   Neurological:      Mental Status: She is alert and oriented to person, place, and time.   Psychiatric:         Mood and Affect: Mood normal.         Behavior: Behavior normal.          Assessment and Plan      ICD-10-CM ICD-9-CM   1. Essential hypertension  I10 401.9   2. Dorsalgia, unspecified  M54.9 724.5   3. Hypothyroidism, unspecified type  E03.9 244.9 "   4. Muscle cramps  R25.2 729.82        Problem List Items Addressed This Visit        Cardiac/Vascular    Essential hypertension - Primary    Relevant Medications    amLODIPine (NORVASC) 10 MG tablet    lisinopriL (PRINIVIL,ZESTRIL) 40 MG tablet    hydroCHLOROthiazide (HYDRODIURIL) 25 MG tablet    Other Relevant Orders    Comprehensive Metabolic Panel    CBC Auto Differential    Lipid Panel       Endocrine    Hypothyroidism    Relevant Orders    TSH      Other Visit Diagnoses     Dorsalgia, unspecified        Relevant Medications    naproxen (NAPROSYN) 500 MG tablet    Muscle cramps        Relevant Orders    Magnesium           Patient Instructions   1. Take Medications as directed  2. Monitor blood pressure outside of clinic  3. Eat a heart healthy diet and get plenty of cardiovascular exercise.          Follow up in about 6 months (around 7/14/2022).

## 2022-03-03 ENCOUNTER — ANESTHESIA (OUTPATIENT)
Dept: SURGERY | Facility: HOSPITAL | Age: 57
End: 2022-03-03
Payer: MEDICARE

## 2022-03-03 ENCOUNTER — ANESTHESIA EVENT (OUTPATIENT)
Dept: SURGERY | Facility: HOSPITAL | Age: 57
End: 2022-03-03
Payer: MEDICARE

## 2022-03-03 ENCOUNTER — HOSPITAL ENCOUNTER (EMERGENCY)
Facility: HOSPITAL | Age: 57
Discharge: HOME | End: 2022-03-03
Attending: EMERGENCY MEDICINE | Admitting: ORTHOPAEDIC SURGERY
Payer: MEDICARE

## 2022-03-03 VITALS
DIASTOLIC BLOOD PRESSURE: 95 MMHG | HEIGHT: 67 IN | SYSTOLIC BLOOD PRESSURE: 132 MMHG | BODY MASS INDEX: 35.31 KG/M2 | WEIGHT: 225 LBS | TEMPERATURE: 98 F | HEART RATE: 90 BPM | RESPIRATION RATE: 10 BRPM | OXYGEN SATURATION: 100 %

## 2022-03-03 DIAGNOSIS — R07.9 CHEST PAIN: ICD-10-CM

## 2022-03-03 DIAGNOSIS — M25.532 WRIST PAIN, ACUTE, LEFT: ICD-10-CM

## 2022-03-03 DIAGNOSIS — S29.9XXA CHEST TRAUMA: ICD-10-CM

## 2022-03-03 DIAGNOSIS — S52.572A OTHER CLOSED INTRA-ARTICULAR FRACTURE OF DISTAL END OF LEFT RADIUS, INITIAL ENCOUNTER: Primary | ICD-10-CM

## 2022-03-03 LAB
ALBUMIN SERPL BCP-MCNC: 4 G/DL (ref 3.5–5)
ALBUMIN/GLOB SERPL: 1 {RATIO}
ALP SERPL-CCNC: 89 U/L (ref 46–118)
ALT SERPL W P-5'-P-CCNC: 75 U/L (ref 13–56)
ANION GAP SERPL CALCULATED.3IONS-SCNC: 16 MMOL/L (ref 7–16)
APTT PPP: 28.5 SECONDS (ref 25.2–37.3)
AST SERPL W P-5'-P-CCNC: 100 U/L (ref 15–37)
BASOPHILS # BLD AUTO: 0.07 K/UL (ref 0–0.2)
BASOPHILS NFR BLD AUTO: 0.3 % (ref 0–1)
BILIRUB SERPL-MCNC: 0.3 MG/DL (ref 0–1.2)
BUN SERPL-MCNC: 10 MG/DL (ref 7–18)
BUN/CREAT SERPL: 6 (ref 6–20)
CALCIUM SERPL-MCNC: 8.5 MG/DL (ref 8.5–10.1)
CHLORIDE SERPL-SCNC: 101 MMOL/L (ref 98–107)
CO2 SERPL-SCNC: 24 MMOL/L (ref 21–32)
CREAT SERPL-MCNC: 1.56 MG/DL (ref 0.55–1.02)
DIFFERENTIAL METHOD BLD: ABNORMAL
EOSINOPHIL # BLD AUTO: 0.03 K/UL (ref 0–0.5)
EOSINOPHIL NFR BLD AUTO: 0.1 % (ref 1–4)
ERYTHROCYTE [DISTWIDTH] IN BLOOD BY AUTOMATED COUNT: 14.7 % (ref 11.5–14.5)
GLOBULIN SER-MCNC: 3.9 G/DL (ref 2–4)
GLUCOSE SERPL-MCNC: 123 MG/DL (ref 74–106)
HCT VFR BLD AUTO: 39.3 % (ref 38–47)
HGB BLD-MCNC: 13.3 G/DL (ref 12–16)
IMM GRANULOCYTES # BLD AUTO: 0.13 K/UL (ref 0–0.04)
IMM GRANULOCYTES NFR BLD: 0.6 % (ref 0–0.4)
INR BLD: 0.99 (ref 0.9–1.1)
LYMPHOCYTES # BLD AUTO: 3.37 K/UL (ref 1–4.8)
LYMPHOCYTES NFR BLD AUTO: 14.5 % (ref 27–41)
MCH RBC QN AUTO: 29.4 PG (ref 27–31)
MCHC RBC AUTO-ENTMCNC: 33.8 G/DL (ref 32–36)
MCV RBC AUTO: 86.8 FL (ref 80–96)
MONOCYTES # BLD AUTO: 0.98 K/UL (ref 0–0.8)
MONOCYTES NFR BLD AUTO: 4.2 % (ref 2–6)
MPC BLD CALC-MCNC: 10.2 FL (ref 9.4–12.4)
NEUTROPHILS # BLD AUTO: 18.65 K/UL (ref 1.8–7.7)
NEUTROPHILS NFR BLD AUTO: 80.3 % (ref 53–65)
NRBC # BLD AUTO: 0 X10E3/UL
NRBC, AUTO (.00): 0 %
PLATELET # BLD AUTO: 291 K/UL (ref 150–400)
POTASSIUM SERPL-SCNC: 3.6 MMOL/L (ref 3.5–5.1)
PROT SERPL-MCNC: 7.9 G/DL (ref 6.4–8.2)
PROTHROMBIN TIME: 13.1 SECONDS (ref 11.7–14.7)
RBC # BLD AUTO: 4.53 M/UL (ref 4.2–5.4)
SARS-COV-2 RDRP RESP QL NAA+PROBE: NEGATIVE
SODIUM SERPL-SCNC: 137 MMOL/L (ref 136–145)
WBC # BLD AUTO: 23.23 K/UL (ref 4.5–11)

## 2022-03-03 PROCEDURE — 27201423 OPTIME MED/SURG SUP & DEVICES STERILE SUPPLY: Performed by: ORTHOPAEDIC SURGERY

## 2022-03-03 PROCEDURE — D9220A PRA ANESTHESIA: ICD-10-PCS | Mod: CRNA,,, | Performed by: NURSE ANESTHETIST, CERTIFIED REGISTERED

## 2022-03-03 PROCEDURE — 85730 THROMBOPLASTIN TIME PARTIAL: CPT | Performed by: EMERGENCY MEDICINE

## 2022-03-03 PROCEDURE — 87635 SARS-COV-2 COVID-19 AMP PRB: CPT | Performed by: EMERGENCY MEDICINE

## 2022-03-03 PROCEDURE — 63600175 PHARM REV CODE 636 W HCPCS: Performed by: ANESTHESIOLOGY

## 2022-03-03 PROCEDURE — 37000009 HC ANESTHESIA EA ADD 15 MINS: Performed by: ORTHOPAEDIC SURGERY

## 2022-03-03 PROCEDURE — 85025 COMPLETE CBC W/AUTO DIFF WBC: CPT | Performed by: EMERGENCY MEDICINE

## 2022-03-03 PROCEDURE — D9220A PRA ANESTHESIA: ICD-10-PCS | Mod: ANES,,, | Performed by: ANESTHESIOLOGY

## 2022-03-03 PROCEDURE — 71000015 HC POSTOP RECOV 1ST HR: Performed by: ORTHOPAEDIC SURGERY

## 2022-03-03 PROCEDURE — 63600175 PHARM REV CODE 636 W HCPCS: Performed by: NURSE ANESTHETIST, CERTIFIED REGISTERED

## 2022-03-03 PROCEDURE — 25000003 PHARM REV CODE 250: Performed by: NURSE ANESTHETIST, CERTIFIED REGISTERED

## 2022-03-03 PROCEDURE — C1713 ANCHOR/SCREW BN/BN,TIS/BN: HCPCS | Performed by: ORTHOPAEDIC SURGERY

## 2022-03-03 PROCEDURE — 80053 COMPREHEN METABOLIC PANEL: CPT | Performed by: EMERGENCY MEDICINE

## 2022-03-03 PROCEDURE — G0390 TRAUMA RESPONS W/HOSP CRITI: HCPCS

## 2022-03-03 PROCEDURE — 27100168 OPTIME MED/SURG SUP & DEVICES NON-STERILE SUPPLY: Performed by: ORTHOPAEDIC SURGERY

## 2022-03-03 PROCEDURE — 63600175 PHARM REV CODE 636 W HCPCS: Performed by: ORTHOPAEDIC SURGERY

## 2022-03-03 PROCEDURE — 99284 PR EMERGENCY DEPT VISIT,LEVEL IV: ICD-10-PCS | Mod: ,,, | Performed by: EMERGENCY MEDICINE

## 2022-03-03 PROCEDURE — 99285 EMERGENCY DEPT VISIT HI MDM: CPT | Mod: 25

## 2022-03-03 PROCEDURE — 36000708 HC OR TIME LEV III 1ST 15 MIN: Performed by: ORTHOPAEDIC SURGERY

## 2022-03-03 PROCEDURE — 93010 ELECTROCARDIOGRAM REPORT: CPT | Mod: ,,, | Performed by: INTERNAL MEDICINE

## 2022-03-03 PROCEDURE — 93010 ELECTROCARDIOGRAM REPORT: CPT | Mod: 77,,, | Performed by: HOSPITALIST

## 2022-03-03 PROCEDURE — 36000709 HC OR TIME LEV III EA ADD 15 MIN: Performed by: ORTHOPAEDIC SURGERY

## 2022-03-03 PROCEDURE — 37000008 HC ANESTHESIA 1ST 15 MINUTES: Performed by: ORTHOPAEDIC SURGERY

## 2022-03-03 PROCEDURE — 93005 ELECTROCARDIOGRAM TRACING: CPT | Mod: 59

## 2022-03-03 PROCEDURE — D9220A PRA ANESTHESIA: Mod: CRNA,,, | Performed by: NURSE ANESTHETIST, CERTIFIED REGISTERED

## 2022-03-03 PROCEDURE — 85610 PROTHROMBIN TIME: CPT | Performed by: EMERGENCY MEDICINE

## 2022-03-03 PROCEDURE — 93010 EKG 12-LEAD: ICD-10-PCS | Mod: 77,,, | Performed by: HOSPITALIST

## 2022-03-03 PROCEDURE — 71000033 HC RECOVERY, INTIAL HOUR: Performed by: ORTHOPAEDIC SURGERY

## 2022-03-03 PROCEDURE — 93005 ELECTROCARDIOGRAM TRACING: CPT

## 2022-03-03 PROCEDURE — 99284 EMERGENCY DEPT VISIT MOD MDM: CPT | Mod: ,,, | Performed by: EMERGENCY MEDICINE

## 2022-03-03 PROCEDURE — D9220A PRA ANESTHESIA: Mod: ANES,,, | Performed by: ANESTHESIOLOGY

## 2022-03-03 PROCEDURE — 36415 COLL VENOUS BLD VENIPUNCTURE: CPT | Performed by: EMERGENCY MEDICINE

## 2022-03-03 PROCEDURE — 93010 EKG 12-LEAD: ICD-10-PCS | Mod: ,,, | Performed by: INTERNAL MEDICINE

## 2022-03-03 DEVICE — IMPLANTABLE DEVICE: Type: IMPLANTABLE DEVICE | Site: WRIST | Status: FUNCTIONAL

## 2022-03-03 RX ORDER — CEFAZOLIN SODIUM 2 G/50ML
2 SOLUTION INTRAVENOUS
Status: DISCONTINUED | OUTPATIENT
Start: 2022-03-03 | End: 2022-03-03 | Stop reason: HOSPADM

## 2022-03-03 RX ORDER — SODIUM CHLORIDE, SODIUM LACTATE, POTASSIUM CHLORIDE, CALCIUM CHLORIDE 600; 310; 30; 20 MG/100ML; MG/100ML; MG/100ML; MG/100ML
INJECTION, SOLUTION INTRAVENOUS CONTINUOUS
Status: DISCONTINUED | OUTPATIENT
Start: 2022-03-03 | End: 2022-03-03 | Stop reason: HOSPADM

## 2022-03-03 RX ORDER — LIDOCAINE HYDROCHLORIDE 20 MG/ML
INJECTION, SOLUTION EPIDURAL; INFILTRATION; INTRACAUDAL; PERINEURAL
Status: DISCONTINUED | OUTPATIENT
Start: 2022-03-03 | End: 2022-03-03

## 2022-03-03 RX ORDER — FENTANYL CITRATE 50 UG/ML
INJECTION, SOLUTION INTRAMUSCULAR; INTRAVENOUS
Status: DISCONTINUED | OUTPATIENT
Start: 2022-03-03 | End: 2022-03-03

## 2022-03-03 RX ORDER — HYDROCODONE BITARTRATE AND ACETAMINOPHEN 10; 325 MG/1; MG/1
1 TABLET ORAL EVERY 6 HOURS PRN
Qty: 28 TABLET | Refills: 0 | Status: SHIPPED | OUTPATIENT
Start: 2022-03-03 | End: 2022-05-27

## 2022-03-03 RX ORDER — CEFAZOLIN SODIUM 1 G/3ML
INJECTION, POWDER, FOR SOLUTION INTRAMUSCULAR; INTRAVENOUS
Status: DISCONTINUED | OUTPATIENT
Start: 2022-03-03 | End: 2022-03-03

## 2022-03-03 RX ORDER — GLYCOPYRROLATE 0.2 MG/ML
INJECTION INTRAMUSCULAR; INTRAVENOUS
Status: DISCONTINUED | OUTPATIENT
Start: 2022-03-03 | End: 2022-03-03

## 2022-03-03 RX ORDER — MEPERIDINE HYDROCHLORIDE 25 MG/ML
25 INJECTION INTRAMUSCULAR; INTRAVENOUS; SUBCUTANEOUS EVERY 10 MIN PRN
Status: DISCONTINUED | OUTPATIENT
Start: 2022-03-03 | End: 2022-03-03 | Stop reason: HOSPADM

## 2022-03-03 RX ORDER — HYDROCODONE BITARTRATE AND ACETAMINOPHEN 10; 325 MG/1; MG/1
1 TABLET ORAL EVERY 4 HOURS PRN
Status: DISCONTINUED | OUTPATIENT
Start: 2022-03-03 | End: 2022-03-03 | Stop reason: HOSPADM

## 2022-03-03 RX ORDER — HYDROMORPHONE HYDROCHLORIDE 2 MG/ML
0.5 INJECTION, SOLUTION INTRAMUSCULAR; INTRAVENOUS; SUBCUTANEOUS EVERY 5 MIN PRN
Status: DISCONTINUED | OUTPATIENT
Start: 2022-03-03 | End: 2022-03-03 | Stop reason: HOSPADM

## 2022-03-03 RX ORDER — PROPOFOL 10 MG/ML
VIAL (ML) INTRAVENOUS
Status: DISCONTINUED | OUTPATIENT
Start: 2022-03-03 | End: 2022-03-03

## 2022-03-03 RX ORDER — PHENYLEPHRINE HYDROCHLORIDE 10 MG/ML
INJECTION INTRAVENOUS
Status: DISCONTINUED | OUTPATIENT
Start: 2022-03-03 | End: 2022-03-03

## 2022-03-03 RX ORDER — ONDANSETRON 2 MG/ML
INJECTION INTRAMUSCULAR; INTRAVENOUS
Status: DISCONTINUED | OUTPATIENT
Start: 2022-03-03 | End: 2022-03-03

## 2022-03-03 RX ORDER — MORPHINE SULFATE 10 MG/ML
4 INJECTION INTRAMUSCULAR; INTRAVENOUS; SUBCUTANEOUS EVERY 5 MIN PRN
Status: DISCONTINUED | OUTPATIENT
Start: 2022-03-03 | End: 2022-03-03 | Stop reason: HOSPADM

## 2022-03-03 RX ORDER — HYDROCODONE BITARTRATE AND ACETAMINOPHEN 5; 325 MG/1; MG/1
1 TABLET ORAL EVERY 4 HOURS PRN
Status: DISCONTINUED | OUTPATIENT
Start: 2022-03-03 | End: 2022-03-03 | Stop reason: HOSPADM

## 2022-03-03 RX ORDER — DIPHENHYDRAMINE HYDROCHLORIDE 50 MG/ML
25 INJECTION INTRAMUSCULAR; INTRAVENOUS EVERY 6 HOURS PRN
Status: DISCONTINUED | OUTPATIENT
Start: 2022-03-03 | End: 2022-03-03 | Stop reason: HOSPADM

## 2022-03-03 RX ORDER — ONDANSETRON 4 MG/1
8 TABLET, ORALLY DISINTEGRATING ORAL EVERY 8 HOURS PRN
Status: DISCONTINUED | OUTPATIENT
Start: 2022-03-03 | End: 2022-03-03 | Stop reason: HOSPADM

## 2022-03-03 RX ORDER — ONDANSETRON 2 MG/ML
4 INJECTION INTRAMUSCULAR; INTRAVENOUS DAILY PRN
Status: DISCONTINUED | OUTPATIENT
Start: 2022-03-03 | End: 2022-03-03 | Stop reason: HOSPADM

## 2022-03-03 RX ORDER — ACETAMINOPHEN 500 MG
1000 TABLET ORAL EVERY 6 HOURS PRN
Status: DISCONTINUED | OUTPATIENT
Start: 2022-03-03 | End: 2022-03-03 | Stop reason: HOSPADM

## 2022-03-03 RX ORDER — PROMETHAZINE HYDROCHLORIDE 25 MG/1
25 TABLET ORAL EVERY 6 HOURS PRN
Status: DISCONTINUED | OUTPATIENT
Start: 2022-03-03 | End: 2022-03-03 | Stop reason: HOSPADM

## 2022-03-03 RX ADMIN — GLYCOPYRROLATE 0.2 MG: 0.2 INJECTION INTRAMUSCULAR; INTRAVENOUS at 05:03

## 2022-03-03 RX ADMIN — LIDOCAINE HYDROCHLORIDE 50 MG: 20 INJECTION, SOLUTION EPIDURAL; INFILTRATION; INTRACAUDAL; PERINEURAL at 05:03

## 2022-03-03 RX ADMIN — PROPOFOL 200 MG: 10 INJECTION, EMULSION INTRAVENOUS at 05:03

## 2022-03-03 RX ADMIN — MORPHINE SULFATE 4 MG: 10 INJECTION INTRAVENOUS at 06:03

## 2022-03-03 RX ADMIN — ONDANSETRON 4 MG: 2 INJECTION INTRAMUSCULAR; INTRAVENOUS at 05:03

## 2022-03-03 RX ADMIN — FENTANYL CITRATE 25 MCG: 50 INJECTION INTRAMUSCULAR; INTRAVENOUS at 06:03

## 2022-03-03 RX ADMIN — SODIUM CHLORIDE, POTASSIUM CHLORIDE, SODIUM LACTATE AND CALCIUM CHLORIDE: 600; 310; 30; 20 INJECTION, SOLUTION INTRAVENOUS at 01:03

## 2022-03-03 RX ADMIN — PHENYLEPHRINE HYDROCHLORIDE 100 MCG: 10 INJECTION INTRAVENOUS at 06:03

## 2022-03-03 RX ADMIN — FENTANYL CITRATE 50 MCG: 50 INJECTION INTRAMUSCULAR; INTRAVENOUS at 05:03

## 2022-03-03 RX ADMIN — CEFAZOLIN 2000 MG: 1 INJECTION, POWDER, FOR SOLUTION INTRAMUSCULAR; INTRAVENOUS; PARENTERAL at 05:03
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
## 2022-03-03 NOTE — ANESTHESIA PROCEDURE NOTES
Intubation    Date/Time: 3/3/2022 5:39 PM  Performed by: Fabricio Richey CRNA  Authorized by: Vincent Moreno MD     Intubation:     Induction:  Intravenous    Intubated:  Postinduction    Mask Ventilation:  Easy mask    Attempts:  1    Attempted By:  CRNA    Difficult Airway Encountered?: No      Complications:  None    Airway Device:  Supraglottic airway/LMA    Airway Device Size:  4.0    Placement Verified By:  Capnometry    Complicating Factors:  None    Findings Post-Intubation:  BS equal bilateral
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
## 2022-03-04 NOTE — OP NOTE
[facility] - Orthopedic Periop Services  General Surgery  Operative Note    SUMMARY     Date of Procedure: 3/3/2022     Procedure: Procedure(s) (LRB):  APPLICATION, EXTERNAL FIXATION DEVICE, SMALL, HAND OR WRIST (Left)       Surgeon(s) and Role:     * Vidal Hernandez MD - Primary    Assisting Surgeon: None    Pre-Operative Diagnosis: Other closed intra-articular fracture of distal end of left radius, initial encounter [S52.572A]    Post-Operative Diagnosis: Post-Op Diagnosis Codes:     * Other closed intra-articular fracture of distal end of left radius, initial encounter [S52.904G]    Anesthesia: Choice    Operative Findings (including complications, if any):  And benefits of procedure explained at length the patient stating that she understands risks and benefits written informed consent was obtained patient was taken operating room placed in supine position on operative table at this time anesthesia was induced per Anesthesia.  Left upper extremity then was prepped draped sterile fashion C-arm was brought in the fracture was reduced under fluoroscopic vision was noted to be se central position confirmed under fluoroscopic vision a clamp for the Domo mini ex fix was then roseanne comminution with intra-articular extension and a large volar fragment at this time the external fixator was placed percutaneously with 2 stab incisions over the index metacarpal and the small Schanz pins being placed across the metacarpal.  Clamp was then placed for the Domo wrist external fixator.  Going proximal the fracture site 2 pins were placed percutaneously into the radius into the central position on the radius on lateral view clamp then placed the radiolucent bar placed in the fracture reduced under fluoroscopic vision in the pin clamps tightened fractures noted be stable sterile occlusive dressing was placed after the pin clamps were all torqued to correct tension.  Sterile occlusive dressing placed patient awakened taken  recovery in good condition all counts correct no complications    Description of Technical Procedures:     Significant Surgical Tasks Conducted by the Assistant(s), if Applicable:     Estimated Blood Loss (EBL): * No values recorded between 3/3/2022  5:50 PM and 3/3/2022  6:11 PM *           Implants: * No implants in log *    Specimens:   Specimen (24h ago, onward)            None                  Condition:     Disposition: PACU - hemodynamically stable.    Attestation: I was present and scrubbed for the entire procedure.

## 2022-03-04 NOTE — OR NURSING
1825 received pt from OR    1850 xray at bedside.     1855 recovery complete, transfer to [facility] 19 1900 report given to christopher hunt rn. 139/84; 98; 95% room air; 16.. family at bedside.
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
## 2022-03-04 NOTE — ANESTHESIA POSTPROCEDURE EVALUATION
Anesthesia Post Evaluation    Patient: Nikkie Brown    Procedure(s) Performed: Procedure(s) (LRB):  APPLICATION, EXTERNAL FIXATION DEVICE, SMALL, HAND OR WRIST (Left)    Final Anesthesia Type: general      Patient location during evaluation: PACU  Patient participation: Yes- Able to Participate  Level of consciousness: awake  Post-procedure vital signs: reviewed and stable  Pain management: adequate  Airway patency: patent  HEIDI mitigation strategies: Extubation and recovery carried out in lateral, semiupright, or other nonsupine position  PONV status at discharge: No PONV  Anesthetic complications: no      Cardiovascular status: hemodynamically stable  Respiratory status: unassisted  Hydration status: euvolemic  Follow-up not needed.          Vitals Value Taken Time   /78 03/03/22 1929   Temp 36.4 °C (97.6 °F) 03/03/22 1830   Pulse 89 03/03/22 1929   Resp 10 03/03/22 1857   SpO2 91 % 03/03/22 1929   Vitals shown include unvalidated device data.      Event Time   Out of Recovery 18:55:00         Pain/Lars Score: Pain Rating Prior to Med Admin: 8 (3/3/2022  6:48 PM)  Lars Score: 10 (3/3/2022  6:55 PM)

## 2022-03-04 NOTE — TRANSFER OF CARE
"Anesthesia Transfer of Care Note    Patient: Nikkie Brown    Procedure(s) Performed: Procedure(s) (LRB):  APPLICATION, EXTERNAL FIXATION DEVICE, SMALL, HAND OR WRIST (Left)    Patient location: PACU    Anesthesia Type: general    Transport from OR: Transported from OR on 6-10 L/min O2 by face mask with adequate spontaneous ventilation    Post pain: adequate analgesia    Post assessment: no apparent anesthetic complications    Post vital signs: stable    Level of consciousness: responds to stimulation and awake    Nausea/Vomiting: no nausea/vomiting    Complications: none    Transfer of care protocol was followedComments: Good SV continue, NAD noted, VSS, RTRN      Last vitals:   Visit Vitals  BP (!) 144/93 (BP Location: Right arm, Patient Position: Lying)   Pulse 91   Temp 36.4 °C (97.6 °F) (Oral)   Resp 16   Ht 5' 7" (1.702 m)   Wt 102.1 kg (225 lb)   SpO2 95%   Breastfeeding No   BMI 35.24 kg/m²     "

## 2022-03-07 ENCOUNTER — OFFICE VISIT (OUTPATIENT)
Dept: FAMILY MEDICINE | Facility: CLINIC | Age: 57
End: 2022-03-07
Payer: MEDICARE

## 2022-03-07 VITALS
DIASTOLIC BLOOD PRESSURE: 88 MMHG | OXYGEN SATURATION: 96 % | BODY MASS INDEX: 37.61 KG/M2 | HEART RATE: 88 BPM | RESPIRATION RATE: 18 BRPM | HEIGHT: 66 IN | TEMPERATURE: 99 F | SYSTOLIC BLOOD PRESSURE: 130 MMHG | WEIGHT: 234 LBS

## 2022-03-07 DIAGNOSIS — S20.01XA CONTUSION OF RIGHT BREAST, INITIAL ENCOUNTER: Primary | ICD-10-CM
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
## 2022-03-07 PROCEDURE — 1159F PR MEDICATION LIST DOCUMENTED IN MEDICAL RECORD: ICD-10-PCS | Mod: ,,, | Performed by: FAMILY MEDICINE

## 2022-03-07 PROCEDURE — 3079F PR MOST RECENT DIASTOLIC BLOOD PRESSURE 80-89 MM HG: ICD-10-PCS | Mod: ,,, | Performed by: FAMILY MEDICINE

## 2022-03-07 PROCEDURE — 4010F ACE/ARB THERAPY RXD/TAKEN: CPT | Mod: ,,, | Performed by: FAMILY MEDICINE

## 2022-03-07 PROCEDURE — 3075F PR MOST RECENT SYSTOLIC BLOOD PRESS GE 130-139MM HG: ICD-10-PCS | Mod: ,,, | Performed by: FAMILY MEDICINE

## 2022-03-07 PROCEDURE — 4010F PR ACE/ARB THEARPY RXD/TAKEN: ICD-10-PCS | Mod: ,,, | Performed by: FAMILY MEDICINE

## 2022-03-07 PROCEDURE — 1160F RVW MEDS BY RX/DR IN RCRD: CPT | Mod: ,,, | Performed by: FAMILY MEDICINE

## 2022-03-07 PROCEDURE — 1159F MED LIST DOCD IN RCRD: CPT | Mod: ,,, | Performed by: FAMILY MEDICINE

## 2022-03-07 PROCEDURE — 99212 PR OFFICE/OUTPT VISIT, EST, LEVL II, 10-19 MIN: ICD-10-PCS | Mod: ,,, | Performed by: FAMILY MEDICINE

## 2022-03-07 PROCEDURE — 1160F PR REVIEW ALL MEDS BY PRESCRIBER/CLIN PHARMACIST DOCUMENTED: ICD-10-PCS | Mod: ,,, | Performed by: FAMILY MEDICINE

## 2022-03-07 PROCEDURE — 3008F BODY MASS INDEX DOCD: CPT | Mod: ,,, | Performed by: FAMILY MEDICINE

## 2022-03-07 PROCEDURE — 3075F SYST BP GE 130 - 139MM HG: CPT | Mod: ,,, | Performed by: FAMILY MEDICINE

## 2022-03-07 PROCEDURE — 3079F DIAST BP 80-89 MM HG: CPT | Mod: ,,, | Performed by: FAMILY MEDICINE

## 2022-03-07 PROCEDURE — 3008F PR BODY MASS INDEX (BMI) DOCUMENTED: ICD-10-PCS | Mod: ,,, | Performed by: FAMILY MEDICINE

## 2022-03-07 PROCEDURE — 99212 OFFICE O/P EST SF 10 MIN: CPT | Mod: ,,, | Performed by: FAMILY MEDICINE

## 2022-03-07 RX ORDER — ARIPIPRAZOLE 15 MG/1
15 TABLET ORAL DAILY
COMMUNITY
Start: 2022-03-02 | End: 2022-08-12 | Stop reason: SDUPTHER

## 2022-03-07 RX ORDER — AMITRIPTYLINE HYDROCHLORIDE 75 MG/1
75 TABLET ORAL NIGHTLY
COMMUNITY
Start: 2022-02-21 | End: 2022-08-12 | Stop reason: SDUPTHER

## 2022-03-07 RX ORDER — TRAZODONE HYDROCHLORIDE 300 MG/1
300 TABLET ORAL NIGHTLY
COMMUNITY
Start: 2022-03-02 | End: 2022-08-12 | Stop reason: SDUPTHER

## 2022-03-07 RX ORDER — BUSPIRONE HYDROCHLORIDE 30 MG/1
30 TABLET ORAL 2 TIMES DAILY
COMMUNITY
Start: 2022-02-24 | End: 2022-08-12 | Stop reason: SDUPTHER
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
## 2022-03-07 NOTE — LETTER
March 7, 2022    Nikkie Brown  [address] [place] [address]         [place] [address] [place] [address]  Phone: [phone]  Fax: [phone] March 7, 2022     Patient: Nikkie Brown   YOB: 1965   Date of Visit: 3/7/2022       To Whom It May Concern:    It is my medical opinion that Nikkie Brown was seen in clinic today. Gibran Galdamez had to bring her into clinic. Please excuse him from work.  If you have any questions or concerns, please don't hesitate to call.    Sincerely,    Alejandra Vann MD

## 2022-03-16 ENCOUNTER — HOSPITAL ENCOUNTER (OUTPATIENT)
Dept: RADIOLOGY | Facility: HOSPITAL | Age: 57
Discharge: HOME | End: 2022-03-16
Attending: ORTHOPAEDIC SURGERY
Payer: MEDICARE

## 2022-03-16 DIAGNOSIS — S62.102D CLOSED FRACTURE OF LEFT WRIST WITH ROUTINE HEALING, SUBSEQUENT ENCOUNTER: ICD-10-CM

## 2022-03-16 PROBLEM — S52.532A CLOSED COLLES' FRACTURE OF LEFT RADIUS: Status: ACTIVE | Noted: 2022-03-16

## 2022-03-16 PROCEDURE — 73110 X-RAY EXAM OF WRIST: CPT | Mod: TC,LT

## 2022-03-21 ENCOUNTER — OFFICE VISIT (OUTPATIENT)
Dept: FAMILY MEDICINE | Facility: CLINIC | Age: 57
End: 2022-03-21
Payer: MEDICARE

## 2022-03-21 ENCOUNTER — HOSPITAL ENCOUNTER (OUTPATIENT)
Dept: RADIOLOGY | Facility: HOSPITAL | Age: 57
Discharge: HOME | End: 2022-03-21
Attending: FAMILY MEDICINE
Payer: MEDICARE

## 2022-03-21 VITALS
HEIGHT: 67 IN | SYSTOLIC BLOOD PRESSURE: 108 MMHG | RESPIRATION RATE: 20 BRPM | DIASTOLIC BLOOD PRESSURE: 74 MMHG | HEART RATE: 101 BPM | BODY MASS INDEX: 35.79 KG/M2 | TEMPERATURE: 97 F | OXYGEN SATURATION: 100 % | WEIGHT: 228 LBS

## 2022-03-21 DIAGNOSIS — N18.32 STAGE 3B CHRONIC KIDNEY DISEASE: ICD-10-CM

## 2022-03-21 DIAGNOSIS — R07.9 CHEST PAIN, UNSPECIFIED TYPE: ICD-10-CM

## 2022-03-21 DIAGNOSIS — F20.9 SCHIZOPHRENIA, UNSPECIFIED TYPE: ICD-10-CM

## 2022-03-21 DIAGNOSIS — E66.01 SEVERE OBESITY (BMI 35.0-39.9) WITH COMORBIDITY: ICD-10-CM

## 2022-03-21 DIAGNOSIS — R07.9 CHEST PAIN, UNSPECIFIED TYPE: Primary | ICD-10-CM

## 2022-03-21 PROCEDURE — 3008F BODY MASS INDEX DOCD: CPT | Mod: ,,, | Performed by: FAMILY MEDICINE

## 2022-03-21 PROCEDURE — 99213 PR OFFICE/OUTPT VISIT, EST, LEVL III, 20-29 MIN: ICD-10-PCS | Mod: ,,, | Performed by: FAMILY MEDICINE

## 2022-03-21 PROCEDURE — 3008F PR BODY MASS INDEX (BMI) DOCUMENTED: ICD-10-PCS | Mod: ,,, | Performed by: FAMILY MEDICINE

## 2022-03-21 PROCEDURE — 3078F PR MOST RECENT DIASTOLIC BLOOD PRESSURE < 80 MM HG: ICD-10-PCS | Mod: ,,, | Performed by: FAMILY MEDICINE

## 2022-03-21 PROCEDURE — 4010F PR ACE/ARB THEARPY RXD/TAKEN: ICD-10-PCS | Mod: ,,, | Performed by: FAMILY MEDICINE

## 2022-03-21 PROCEDURE — 4010F ACE/ARB THERAPY RXD/TAKEN: CPT | Mod: ,,, | Performed by: FAMILY MEDICINE

## 2022-03-21 PROCEDURE — 1159F PR MEDICATION LIST DOCUMENTED IN MEDICAL RECORD: ICD-10-PCS | Mod: ,,, | Performed by: FAMILY MEDICINE

## 2022-03-21 PROCEDURE — 93000 ELECTROCARDIOGRAM COMPLETE: CPT | Mod: ,,, | Performed by: FAMILY MEDICINE

## 2022-03-21 PROCEDURE — 93000 PR ELECTROCARDIOGRAM, COMPLETE: ICD-10-PCS | Mod: ,,, | Performed by: FAMILY MEDICINE

## 2022-03-21 PROCEDURE — 3074F PR MOST RECENT SYSTOLIC BLOOD PRESSURE < 130 MM HG: ICD-10-PCS | Mod: ,,, | Performed by: FAMILY MEDICINE

## 2022-03-21 PROCEDURE — 71046 X-RAY EXAM CHEST 2 VIEWS: CPT | Mod: TC

## 2022-03-21 PROCEDURE — 3078F DIAST BP <80 MM HG: CPT | Mod: ,,, | Performed by: FAMILY MEDICINE

## 2022-03-21 PROCEDURE — 99213 OFFICE O/P EST LOW 20 MIN: CPT | Mod: ,,, | Performed by: FAMILY MEDICINE

## 2022-03-21 PROCEDURE — 3074F SYST BP LT 130 MM HG: CPT | Mod: ,,, | Performed by: FAMILY MEDICINE

## 2022-03-21 PROCEDURE — 1159F MED LIST DOCD IN RCRD: CPT | Mod: ,,, | Performed by: FAMILY MEDICINE

## 2022-03-21 RX ORDER — ASPIRIN 325 MG
325 TABLET ORAL
Status: COMPLETED | OUTPATIENT
Start: 2022-03-21 | End: 2022-03-21

## 2022-03-21 RX ORDER — AZITHROMYCIN 250 MG/1
TABLET, FILM COATED ORAL
COMMUNITY
Start: 2022-03-12 | End: 2022-03-22

## 2022-03-21 RX ADMIN — Medication 325 MG: at 10:03
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
## 2022-03-22 PROBLEM — E66.01 SEVERE OBESITY (BMI 35.0-39.9) WITH COMORBIDITY: Status: ACTIVE | Noted: 2022-03-22

## 2022-03-22 PROBLEM — F20.9 SCHIZOPHRENIA, UNSPECIFIED TYPE: Status: ACTIVE | Noted: 2022-03-22

## 2022-03-22 PROBLEM — N18.32 STAGE 3B CHRONIC KIDNEY DISEASE: Status: ACTIVE | Noted: 2022-03-22

## 2022-04-13 ENCOUNTER — HOSPITAL ENCOUNTER (OUTPATIENT)
Dept: RADIOLOGY | Facility: HOSPITAL | Age: 57
Discharge: HOME | End: 2022-04-13
Attending: ORTHOPAEDIC SURGERY
Payer: MEDICARE

## 2022-04-13 DIAGNOSIS — S52.532D CLOSED COLLES' FRACTURE OF LEFT RADIUS WITH ROUTINE HEALING, SUBSEQUENT ENCOUNTER: ICD-10-CM

## 2022-04-13 PROCEDURE — 73110 X-RAY EXAM OF WRIST: CPT | Mod: TC,LT

## 2022-04-28 ENCOUNTER — OFFICE VISIT (OUTPATIENT)
Dept: FAMILY MEDICINE | Facility: CLINIC | Age: 57
End: 2022-04-28
Payer: MEDICARE

## 2022-04-28 ENCOUNTER — HOSPITAL ENCOUNTER (OUTPATIENT)
Dept: RADIOLOGY | Facility: HOSPITAL | Age: 57
Discharge: HOME | End: 2022-04-28
Attending: FAMILY MEDICINE
Payer: MEDICARE

## 2022-04-28 VITALS
WEIGHT: 221 LBS | DIASTOLIC BLOOD PRESSURE: 84 MMHG | HEIGHT: 67 IN | OXYGEN SATURATION: 100 % | TEMPERATURE: 99 F | HEART RATE: 100 BPM | BODY MASS INDEX: 34.69 KG/M2 | SYSTOLIC BLOOD PRESSURE: 118 MMHG | RESPIRATION RATE: 20 BRPM

## 2022-04-28 DIAGNOSIS — Z12.31 ENCOUNTER FOR SCREENING MAMMOGRAM FOR MALIGNANT NEOPLASM OF BREAST: ICD-10-CM

## 2022-04-28 DIAGNOSIS — N63.10 BREAST MASS, RIGHT: Primary | ICD-10-CM

## 2022-04-28 DIAGNOSIS — N63.10 BREAST MASS, RIGHT: ICD-10-CM
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
## 2022-04-28 DIAGNOSIS — E03.9 HYPOTHYROIDISM, UNSPECIFIED TYPE: ICD-10-CM

## 2022-04-28 PROCEDURE — 3074F SYST BP LT 130 MM HG: CPT | Mod: ,,, | Performed by: FAMILY MEDICINE

## 2022-04-28 PROCEDURE — 3008F BODY MASS INDEX DOCD: CPT | Mod: ,,, | Performed by: FAMILY MEDICINE

## 2022-04-28 PROCEDURE — 1160F RVW MEDS BY RX/DR IN RCRD: CPT | Mod: ,,, | Performed by: FAMILY MEDICINE

## 2022-04-28 PROCEDURE — 1159F MED LIST DOCD IN RCRD: CPT | Mod: ,,, | Performed by: FAMILY MEDICINE

## 2022-04-28 PROCEDURE — 3079F PR MOST RECENT DIASTOLIC BLOOD PRESSURE 80-89 MM HG: ICD-10-PCS | Mod: ,,, | Performed by: FAMILY MEDICINE

## 2022-04-28 PROCEDURE — 3008F PR BODY MASS INDEX (BMI) DOCUMENTED: ICD-10-PCS | Mod: ,,, | Performed by: FAMILY MEDICINE

## 2022-04-28 PROCEDURE — 4010F PR ACE/ARB THEARPY RXD/TAKEN: ICD-10-PCS | Mod: ,,, | Performed by: FAMILY MEDICINE

## 2022-04-28 PROCEDURE — 3074F PR MOST RECENT SYSTOLIC BLOOD PRESSURE < 130 MM HG: ICD-10-PCS | Mod: ,,, | Performed by: FAMILY MEDICINE

## 2022-04-28 PROCEDURE — 1159F PR MEDICATION LIST DOCUMENTED IN MEDICAL RECORD: ICD-10-PCS | Mod: ,,, | Performed by: FAMILY MEDICINE

## 2022-04-28 PROCEDURE — 99213 PR OFFICE/OUTPT VISIT, EST, LEVL III, 20-29 MIN: ICD-10-PCS | Mod: ,,, | Performed by: FAMILY MEDICINE

## 2022-04-28 PROCEDURE — 71101 X-RAY EXAM UNILAT RIBS/CHEST: CPT | Mod: TC,RT

## 2022-04-28 PROCEDURE — 99213 OFFICE O/P EST LOW 20 MIN: CPT | Mod: ,,, | Performed by: FAMILY MEDICINE

## 2022-04-28 PROCEDURE — 3079F DIAST BP 80-89 MM HG: CPT | Mod: ,,, | Performed by: FAMILY MEDICINE

## 2022-04-28 PROCEDURE — 4010F ACE/ARB THERAPY RXD/TAKEN: CPT | Mod: ,,, | Performed by: FAMILY MEDICINE

## 2022-04-28 PROCEDURE — 1160F PR REVIEW ALL MEDS BY PRESCRIBER/CLIN PHARMACIST DOCUMENTED: ICD-10-PCS | Mod: ,,, | Performed by: FAMILY MEDICINE

## 2022-04-28 RX ORDER — LEVOTHYROXINE SODIUM 88 UG/1
88 TABLET ORAL
Qty: 30 TABLET | Refills: 5 | Status: SHIPPED | OUTPATIENT
Start: 2022-04-28 | End: 2022-08-12 | Stop reason: SDUPTHER
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
## 2022-05-04 PROBLEM — G47.00 INSOMNIA: Chronic | Status: ACTIVE | Noted: 2021-07-01

## 2022-05-04 PROBLEM — I10 ESSENTIAL HYPERTENSION: Chronic | Status: ACTIVE | Noted: 2021-05-25

## 2022-05-04 PROBLEM — E03.9 HYPOTHYROIDISM: Chronic | Status: ACTIVE | Noted: 2021-05-25

## 2022-05-04 PROBLEM — F20.9 SCHIZOPHRENIA, UNSPECIFIED TYPE: Chronic | Status: ACTIVE | Noted: 2022-03-22

## 2022-05-04 PROBLEM — M15.9 OSTEOARTHRITIS OF MULTIPLE JOINTS: Chronic | Status: ACTIVE | Noted: 2021-07-04

## 2022-05-04 PROBLEM — K62.1 RECTAL POLYP: Chronic | Status: ACTIVE | Noted: 2021-05-21

## 2022-05-04 PROBLEM — Z12.11 COLON CANCER SCREENING: Chronic | Status: ACTIVE | Noted: 2021-05-21

## 2022-05-04 PROBLEM — N18.32 STAGE 3B CHRONIC KIDNEY DISEASE: Chronic | Status: ACTIVE | Noted: 2022-03-22

## 2022-05-04 PROBLEM — F41.9 ANXIETY: Chronic | Status: ACTIVE | Noted: 2021-07-04

## 2022-05-04 PROBLEM — K63.5 POLYP OF DESCENDING COLON: Chronic | Status: ACTIVE | Noted: 2021-05-21

## 2022-05-10 ENCOUNTER — HOSPITAL ENCOUNTER (OUTPATIENT)
Dept: RADIOLOGY | Facility: HOSPITAL | Age: 57
Discharge: HOME | End: 2022-05-10
Attending: STUDENT IN AN ORGANIZED HEALTH CARE EDUCATION/TRAINING PROGRAM
Payer: MEDICARE

## 2022-05-10 ENCOUNTER — HOSPITAL ENCOUNTER (OUTPATIENT)
Dept: RADIOLOGY | Facility: HOSPITAL | Age: 57
Discharge: HOME | End: 2022-05-10
Attending: FAMILY MEDICINE
Payer: MEDICARE

## 2022-05-10 VITALS — HEIGHT: 67 IN | WEIGHT: 221 LBS | BODY MASS INDEX: 34.69 KG/M2

## 2022-05-10 DIAGNOSIS — Z12.31 ENCOUNTER FOR SCREENING MAMMOGRAM FOR MALIGNANT NEOPLASM OF BREAST: ICD-10-CM

## 2022-05-10 DIAGNOSIS — R92.8 ABNORMAL FINDING ON BREAST IMAGING: ICD-10-CM

## 2022-05-10 PROCEDURE — 76642 ULTRASOUND BREAST LIMITED: CPT | Mod: TC,RT

## 2022-05-10 PROCEDURE — 77067 SCR MAMMO BI INCL CAD: CPT | Mod: TC

## 2022-05-27 ENCOUNTER — OFFICE VISIT (OUTPATIENT)
Dept: FAMILY MEDICINE | Facility: CLINIC | Age: 57
End: 2022-05-27
Payer: MEDICARE

## 2022-05-27 VITALS
DIASTOLIC BLOOD PRESSURE: 85 MMHG | HEART RATE: 100 BPM | OXYGEN SATURATION: 98 % | HEIGHT: 67 IN | WEIGHT: 221 LBS | BODY MASS INDEX: 34.69 KG/M2 | TEMPERATURE: 98 F | RESPIRATION RATE: 18 BRPM | SYSTOLIC BLOOD PRESSURE: 135 MMHG

## 2022-05-27 DIAGNOSIS — R30.0 DYSURIA: ICD-10-CM

## 2022-05-27 DIAGNOSIS — N89.8 VAGINAL ITCHING: Primary | ICD-10-CM

## 2022-05-27 LAB
BILIRUB SERPL-MCNC: NEGATIVE MG/DL
BLOOD URINE, POC: NEGATIVE
CANDIDA SPECIES: NEGATIVE
COLOR, POC UA: YELLOW
GARDNERELLA: NEGATIVE
GLUCOSE UR QL STRIP: NEGATIVE
KETONES UR QL STRIP: NEGATIVE
LEUKOCYTE ESTERASE URINE, POC: NEGATIVE
NITRITE, POC UA: NEGATIVE
PH, POC UA: 7
PROTEIN, POC: ABNORMAL
SPECIFIC GRAVITY, POC UA: 1.01
TRICHOMONAS: NEGATIVE
UROBILINOGEN, POC UA: 0.2

## 2022-05-27 PROCEDURE — 3079F DIAST BP 80-89 MM HG: CPT | Mod: ,,, | Performed by: FAMILY MEDICINE

## 2022-05-27 PROCEDURE — 87077 CULTURE AEROBIC IDENTIFY: CPT | Mod: ,,, | Performed by: CLINICAL MEDICAL LABORATORY

## 2022-05-27 PROCEDURE — 3079F PR MOST RECENT DIASTOLIC BLOOD PRESSURE 80-89 MM HG: ICD-10-PCS | Mod: ,,, | Performed by: FAMILY MEDICINE

## 2022-05-27 PROCEDURE — 4010F ACE/ARB THERAPY RXD/TAKEN: CPT | Mod: ,,, | Performed by: FAMILY MEDICINE

## 2022-05-27 PROCEDURE — 3008F BODY MASS INDEX DOCD: CPT | Mod: ,,, | Performed by: FAMILY MEDICINE

## 2022-05-27 PROCEDURE — 4010F PR ACE/ARB THEARPY RXD/TAKEN: ICD-10-PCS | Mod: ,,, | Performed by: FAMILY MEDICINE

## 2022-05-27 PROCEDURE — 87186 SC STD MICRODIL/AGAR DIL: CPT | Mod: ,,, | Performed by: CLINICAL MEDICAL LABORATORY

## 2022-05-27 PROCEDURE — 99214 OFFICE O/P EST MOD 30 MIN: CPT | Mod: ,,, | Performed by: FAMILY MEDICINE

## 2022-05-27 PROCEDURE — 3075F PR MOST RECENT SYSTOLIC BLOOD PRESS GE 130-139MM HG: ICD-10-PCS | Mod: ,,, | Performed by: FAMILY MEDICINE

## 2022-05-27 PROCEDURE — 3075F SYST BP GE 130 - 139MM HG: CPT | Mod: ,,, | Performed by: FAMILY MEDICINE

## 2022-05-27 PROCEDURE — 87086 URINE CULTURE/COLONY COUNT: CPT | Mod: ,,, | Performed by: CLINICAL MEDICAL LABORATORY

## 2022-05-27 PROCEDURE — 87480 BACTERIAL VAGINOSIS: ICD-10-PCS | Mod: ,,, | Performed by: CLINICAL MEDICAL LABORATORY

## 2022-05-27 PROCEDURE — 81003 POCT URINALYSIS W/O SCOPE: ICD-10-PCS | Mod: QW,,, | Performed by: FAMILY MEDICINE

## 2022-05-27 PROCEDURE — 87510 GARDNER VAG DNA DIR PROBE: CPT | Mod: ,,, | Performed by: CLINICAL MEDICAL LABORATORY

## 2022-05-27 PROCEDURE — 87510 BACTERIAL VAGINOSIS: ICD-10-PCS | Mod: ,,, | Performed by: CLINICAL MEDICAL LABORATORY

## 2022-05-27 PROCEDURE — 87086 CULTURE, URINE: ICD-10-PCS | Mod: ,,, | Performed by: CLINICAL MEDICAL LABORATORY

## 2022-05-27 PROCEDURE — 87480 CANDIDA DNA DIR PROBE: CPT | Mod: ,,, | Performed by: CLINICAL MEDICAL LABORATORY

## 2022-05-27 PROCEDURE — 1159F MED LIST DOCD IN RCRD: CPT | Mod: ,,, | Performed by: FAMILY MEDICINE

## 2022-05-27 PROCEDURE — 87660 BACTERIAL VAGINOSIS: ICD-10-PCS | Mod: ,,, | Performed by: CLINICAL MEDICAL LABORATORY

## 2022-05-27 PROCEDURE — 81003 URINALYSIS AUTO W/O SCOPE: CPT | Mod: QW,,, | Performed by: FAMILY MEDICINE

## 2022-05-27 PROCEDURE — 1160F PR REVIEW ALL MEDS BY PRESCRIBER/CLIN PHARMACIST DOCUMENTED: ICD-10-PCS | Mod: ,,, | Performed by: FAMILY MEDICINE

## 2022-05-27 PROCEDURE — 1159F PR MEDICATION LIST DOCUMENTED IN MEDICAL RECORD: ICD-10-PCS | Mod: ,,, | Performed by: FAMILY MEDICINE

## 2022-05-27 PROCEDURE — 87186 CULTURE, URINE: ICD-10-PCS | Mod: ,,, | Performed by: CLINICAL MEDICAL LABORATORY

## 2022-05-27 PROCEDURE — 3008F PR BODY MASS INDEX (BMI) DOCUMENTED: ICD-10-PCS | Mod: ,,, | Performed by: FAMILY MEDICINE

## 2022-05-27 PROCEDURE — 99214 PR OFFICE/OUTPT VISIT, EST, LEVL IV, 30-39 MIN: ICD-10-PCS | Mod: ,,, | Performed by: FAMILY MEDICINE

## 2022-05-27 PROCEDURE — 87077 CULTURE, URINE: ICD-10-PCS | Mod: ,,, | Performed by: CLINICAL MEDICAL LABORATORY

## 2022-05-27 PROCEDURE — 87660 TRICHOMONAS VAGIN DIR PROBE: CPT | Mod: ,,, | Performed by: CLINICAL MEDICAL LABORATORY

## 2022-05-27 PROCEDURE — 1160F RVW MEDS BY RX/DR IN RCRD: CPT | Mod: ,,, | Performed by: FAMILY MEDICINE

## 2022-05-27 RX ORDER — HYDROMORPHONE HYDROCHLORIDE 2 MG/1
2 TABLET ORAL EVERY 6 HOURS PRN
COMMUNITY
Start: 2022-05-18 | End: 2022-07-14

## 2022-05-27 RX ORDER — FLUCONAZOLE 150 MG/1
150 TABLET ORAL DAILY
Qty: 2 TABLET | Refills: 0 | Status: SHIPPED | OUTPATIENT
Start: 2022-05-27 | End: 2022-05-28

## 2022-05-27 RX ORDER — GABAPENTIN 100 MG/1
100 CAPSULE ORAL 3 TIMES DAILY
COMMUNITY
Start: 2022-05-18 | End: 2022-07-14 | Stop reason: SDUPTHER

## 2022-05-27 NOTE — PATIENT INSTRUCTIONS
William Lundy,     If you are due for any health screening(s) below please notify me so we can arrange them to be ordered and scheduled to maintain your health. Most healthy patients complete it. Don't lose out on improving your health.     Health Maintenance   Topic Date Due    Hepatitis C Screening  Never done    TETANUS VACCINE  05/27/2023 (Originally 2/8/1983)    Lipid Panel  01/14/2023    Mammogram  05/10/2023                      May 28, 2022       Nikkie Brown  [address]  [place] [address]           Dear Nikkie:    Your [facility] is dedicated to helping you stay healthy with regular scheduled recommended screenings.  Scheduling routine screenings is important to maintaining good health. Our records indicate that you may be overdue for your screening pap smear.  Pap smear screening can help identify patients at risk for developing cervical cancer at an early stage, when it is most likely to be successfully treated.    The current recommendation for Pap smear screening is every 3-5 years.  We encourage you to schedule your appointment with your [facility] health provider.  Many women see a Gynecologist for this screening but some primary care providers also provide Pap screening  If you recently had your pap smear screening performed outside of [facility], please let your Health care team know so that they can update your health record.      If you have questions or want to schedule your screening elsewhere, please call [phone] to speak to a [facility] coordinator.    Sincerely,    Your [facility] Care Team

## 2022-05-27 NOTE — PROGRESS NOTES
"New Clinic Note  Nikkie Brown is a 57 y.o. female     CC:   Chief Complaint   Patient presents with    Urinary Tract Infection     Stated she been on antibiotics post surgery for about 4 weeks and thinks she has either a UTI or yeast infection. Complains of "burning" when she voids.     Vaginitis     Here with possible "yeast" infection due to long term antibiotic use. Used OTC yeast cream without results.         Subjective  Patient complains of burning and itching for 1 week in vaginal area. She reports that she has been on antibiotics for 4 weeks due to an infection in her left wrist. She denies a discharge. She has used monistat with some relief.     Presents to clinic for follow up on chronic health problems    Hypertension:    Takes medications as directed: yes  Checks blood pressure outside of clinic: yes  On a statin: no  Cardiovascular exercise: no  Heart healthy diet: no         Current Outpatient Medications:     amitriptyline (ELAVIL) 75 MG tablet, Take 75 mg by mouth nightly., Disp: , Rfl:     ARIPiprazole (ABILIFY) 15 MG Tab, Take 15 mg by mouth once daily., Disp: , Rfl:     busPIRone (BUSPAR) 30 MG Tab, Take 30 mg by mouth 2 (two) times daily., Disp: , Rfl:     gabapentin (NEURONTIN) 100 MG capsule, Take 100 mg by mouth 3 (three) times daily., Disp: , Rfl:     hydrALAZINE (APRESOLINE) 25 MG tablet, Take 25 mg by mouth 3 (three) times daily., Disp: , Rfl:     hydroCHLOROthiazide (HYDRODIURIL) 25 MG tablet, Take 1 tablet (25 mg total) by mouth once daily., Disp: 90 tablet, Rfl: 1    HYDROmorphone (DILAUDID) 2 MG tablet, Take 2 mg by mouth every 6 (six) hours as needed., Disp: , Rfl:     levothyroxine (SYNTHROID) 88 MCG tablet, Take 1 tablet (88 mcg total) by mouth before breakfast., Disp: 30 tablet, Rfl: 5    lisinopriL (PRINIVIL,ZESTRIL) 40 MG tablet, Take 1 tablet (40 mg total) by mouth once daily., Disp: 90 tablet, Rfl: 1    naproxen (NAPROSYN) 500 MG tablet, Take 1 tablet (500 mg " total) by mouth 2 (two) times daily., Disp: 180 tablet, Rfl: 1    sulfamethoxazole-trimethoprim 800-160mg (BACTRIM DS) 800-160 mg Tab, Take 1 tablet by mouth 2 (two) times daily., Disp: 20 tablet, Rfl: 0    traZODone (DESYREL) 300 MG tablet, Take 300 mg by mouth nightly., Disp: , Rfl:     fluconazole (DIFLUCAN) 150 MG Tab, Take 1 tablet (150 mg total) by mouth once daily. for 1 day, Disp: 2 tablet, Rfl: 0    sulfamethoxazole-trimethoprim 800-160mg (BACTRIM DS) 800-160 mg Tab, Take 1 tablet by mouth 2 (two) times daily. (Patient not taking: No sig reported), Disp: 28 tablet, Rfl: 0     Past Medical History:   Diagnosis Date    Anxiety     Bipolar disorder     Hypertension     Hypothyroidism     Polyp of descending colon 05/21/2021    Rectal polyp 05/21/2021    Screening for malignant neoplasm of colon 05/21/2021        Family History   Problem Relation Age of Onset    Hypertension Father     Mental retardation Father     Cancer Father     Hypertension Sister     Mental retardation Sister     Stroke Maternal Grandmother     Heart disease Maternal Grandmother     No Known Problems Brother         Past Surgical History:   Procedure Laterality Date    ANKLE SURGERY Left 2001    broke ankle has a plate in this ankle    APPLICATION OF EXTERNAL FIXATION DEVICE TO UPPER EXTREMITY Left 03/03/2022    Procedure: APPLICATION, EXTERNAL FIXATION DEVICE, SMALL, HAND OR WRIST;  Surgeon: Vidal Hernandez MD;  Location: [facility];  Service: Orthopedics;  Laterality: Left;    FRACTURE SURGERY      TUBAL LIGATION  1997        Review of Systems   Constitutional: Negative for fatigue and fever.   HENT: Negative for ear pain, postnasal drip, rhinorrhea and sinus pressure/congestion.    Respiratory: Negative for cough and shortness of breath.    Cardiovascular: Negative for chest pain.   Gastrointestinal: Negative for abdominal pain, diarrhea, nausea and vomiting.   Genitourinary: Positive for dysuria.  "Negative for hematuria and nocturia.   Neurological: Negative for headaches.        /85 (BP Location: Left arm, Patient Position: Sitting, BP Method: Large (Automatic))   Pulse 100   Temp 98.3 °F (36.8 °C) (Oral)   Resp 18   Ht 5' 7" (1.702 m)   Wt 100.2 kg (221 lb)   SpO2 98%   BMI 34.61 kg/m²      Physical Exam  Exam conducted with a chaperone present.   HENT:      Head: Normocephalic and atraumatic.   Cardiovascular:      Rate and Rhythm: Normal rate and regular rhythm.   Pulmonary:      Effort: Pulmonary effort is normal.      Breath sounds: Normal breath sounds.   Genitourinary:     General: Normal vulva.      Vagina: Vaginal discharge present.      Cervix: Normal.      Uterus: Normal.       Adnexa: Right adnexa normal and left adnexa normal.   Neurological:      Mental Status: She is alert and oriented to person, place, and time.   Psychiatric:         Mood and Affect: Mood normal.         Behavior: Behavior normal.          Assessment and Plan      ICD-10-CM ICD-9-CM   1. Vaginal itching  N89.8 698.1   2. Dysuria  R30.0 788.1        Problem List Items Addressed This Visit    None     Visit Diagnoses     Vaginal itching    -  Primary    Relevant Medications    fluconazole (DIFLUCAN) 150 MG Tab    Other Relevant Orders    Bacterial Vaginosis (Completed)    Dysuria        Relevant Orders    POCT URINALYSIS W/O SCOPE (Completed)    Urine culture           Patient Instructions       William Lundy,     If you are due for any health screening(s) below please notify me so we can arrange them to be ordered and scheduled to maintain your health. Most healthy patients complete it. Don't lose out on improving your health.     Health Maintenance   Topic Date Due    Hepatitis C Screening  Never done    TETANUS VACCINE  05/27/2023 (Originally 2/8/1983)    Lipid Panel  01/14/2023    Mammogram  05/10/2023                      May 28, 2022       [place] [address]           Dear " Nikkie:    Your [facility] is dedicated to helping you stay healthy with regular scheduled recommended screenings.  Scheduling routine screenings is important to maintaining good health. Our records indicate that you may be overdue for your screening pap smear.  Pap smear screening can help identify patients at risk for developing cervical cancer at an early stage, when it is most likely to be successfully treated.    The current recommendation for Pap smear screening is every 3-5 years.  We encourage you to schedule your appointment with your [facility] health provider.  Many women see a Gynecologist for this screening but some primary care providers also provide Pap screening  If you recently had your pap smear screening performed outside of [facility], please let your Health care team know so that they can update your health record.      If you have questions or want to schedule your screening elsewhere, please call [phone] to speak to a [facility] coordinator.    Sincerely,    Your [facility] Care Team                    Follow up if symptoms worsen or fail to improve.

## 2022-05-31 LAB — UA COMPLETE W REFLEX CULTURE PNL UR: ABNORMAL

## 2022-06-14 ENCOUNTER — HOSPITAL ENCOUNTER (OUTPATIENT)
Dept: RADIOLOGY | Facility: HOSPITAL | Age: 57
Discharge: HOME | End: 2022-06-14
Attending: STUDENT IN AN ORGANIZED HEALTH CARE EDUCATION/TRAINING PROGRAM
Payer: MEDICARE

## 2022-06-14 DIAGNOSIS — R92.8 ABNORMAL FINDING ON RADIOLOGICAL EXAMINATION OF BREAST: ICD-10-CM

## 2022-06-14 PROCEDURE — 76642 ULTRASOUND BREAST LIMITED: CPT | Mod: TC,RT

## 2022-07-14 ENCOUNTER — OFFICE VISIT (OUTPATIENT)
Dept: FAMILY MEDICINE | Facility: CLINIC | Age: 57
End: 2022-07-14
Payer: MEDICARE

## 2022-07-14 VITALS
WEIGHT: 214 LBS | SYSTOLIC BLOOD PRESSURE: 118 MMHG | HEART RATE: 90 BPM | BODY MASS INDEX: 33.59 KG/M2 | OXYGEN SATURATION: 98 % | RESPIRATION RATE: 18 BRPM | DIASTOLIC BLOOD PRESSURE: 85 MMHG | TEMPERATURE: 100 F | HEIGHT: 67 IN

## 2022-07-14 DIAGNOSIS — M54.9 DORSALGIA, UNSPECIFIED: ICD-10-CM

## 2022-07-14 DIAGNOSIS — I10 ESSENTIAL HYPERTENSION: ICD-10-CM

## 2022-07-14 DIAGNOSIS — E03.9 HYPOTHYROIDISM, UNSPECIFIED TYPE: ICD-10-CM

## 2022-07-14 LAB — TSH SERPL DL<=0.005 MIU/L-ACNC: 0.52 UIU/ML (ref 0.36–3.74)

## 2022-07-14 PROCEDURE — 1160F PR REVIEW ALL MEDS BY PRESCRIBER/CLIN PHARMACIST DOCUMENTED: ICD-10-PCS | Mod: ,,, | Performed by: FAMILY MEDICINE

## 2022-07-14 PROCEDURE — 1159F MED LIST DOCD IN RCRD: CPT | Mod: ,,, | Performed by: FAMILY MEDICINE

## 2022-07-14 PROCEDURE — 99214 PR OFFICE/OUTPT VISIT, EST, LEVL IV, 30-39 MIN: ICD-10-PCS | Mod: ,,, | Performed by: FAMILY MEDICINE

## 2022-07-14 PROCEDURE — 1159F PR MEDICATION LIST DOCUMENTED IN MEDICAL RECORD: ICD-10-PCS | Mod: ,,, | Performed by: FAMILY MEDICINE

## 2022-07-14 PROCEDURE — 3074F SYST BP LT 130 MM HG: CPT | Mod: ,,, | Performed by: FAMILY MEDICINE

## 2022-07-14 PROCEDURE — 3079F PR MOST RECENT DIASTOLIC BLOOD PRESSURE 80-89 MM HG: ICD-10-PCS | Mod: ,,, | Performed by: FAMILY MEDICINE

## 2022-07-14 PROCEDURE — 3008F PR BODY MASS INDEX (BMI) DOCUMENTED: ICD-10-PCS | Mod: ,,, | Performed by: FAMILY MEDICINE

## 2022-07-14 PROCEDURE — 99214 OFFICE O/P EST MOD 30 MIN: CPT | Mod: ,,, | Performed by: FAMILY MEDICINE

## 2022-07-14 PROCEDURE — 4010F ACE/ARB THERAPY RXD/TAKEN: CPT | Mod: ,,, | Performed by: FAMILY MEDICINE

## 2022-07-14 PROCEDURE — 84443 ASSAY THYROID STIM HORMONE: CPT | Mod: ,,, | Performed by: CLINICAL MEDICAL LABORATORY

## 2022-07-14 PROCEDURE — 4010F PR ACE/ARB THEARPY RXD/TAKEN: ICD-10-PCS | Mod: ,,, | Performed by: FAMILY MEDICINE

## 2022-07-14 PROCEDURE — 3008F BODY MASS INDEX DOCD: CPT | Mod: ,,, | Performed by: FAMILY MEDICINE

## 2022-07-14 PROCEDURE — 1160F RVW MEDS BY RX/DR IN RCRD: CPT | Mod: ,,, | Performed by: FAMILY MEDICINE

## 2022-07-14 PROCEDURE — 3074F PR MOST RECENT SYSTOLIC BLOOD PRESSURE < 130 MM HG: ICD-10-PCS | Mod: ,,, | Performed by: FAMILY MEDICINE

## 2022-07-14 PROCEDURE — 84443 TSH: ICD-10-PCS | Mod: ,,, | Performed by: CLINICAL MEDICAL LABORATORY

## 2022-07-14 PROCEDURE — 3079F DIAST BP 80-89 MM HG: CPT | Mod: ,,, | Performed by: FAMILY MEDICINE

## 2022-07-14 RX ORDER — HYDROCHLOROTHIAZIDE 25 MG/1
25 TABLET ORAL DAILY
Qty: 90 TABLET | Refills: 1 | Status: SHIPPED | OUTPATIENT
Start: 2022-07-14 | End: 2022-08-12 | Stop reason: SDUPTHER

## 2022-07-14 RX ORDER — LISINOPRIL 40 MG/1
40 TABLET ORAL DAILY
Qty: 90 TABLET | Refills: 1 | Status: SHIPPED | OUTPATIENT
Start: 2022-07-14 | End: 2022-08-12 | Stop reason: SDUPTHER

## 2022-07-14 RX ORDER — HYDRALAZINE HYDROCHLORIDE 25 MG/1
25 TABLET, FILM COATED ORAL 3 TIMES DAILY
Qty: 270 TABLET | Refills: 1 | Status: SHIPPED | OUTPATIENT
Start: 2022-07-14 | End: 2022-08-12 | Stop reason: SDUPTHER

## 2022-07-14 RX ORDER — GABAPENTIN 100 MG/1
100 CAPSULE ORAL 3 TIMES DAILY
Qty: 270 CAPSULE | Refills: 1 | Status: SHIPPED | OUTPATIENT
Start: 2022-07-14 | End: 2022-08-12 | Stop reason: SDUPTHER

## 2022-07-14 RX ORDER — NAPROXEN 500 MG/1
500 TABLET ORAL 2 TIMES DAILY
Qty: 180 TABLET | Refills: 1 | Status: SHIPPED | OUTPATIENT
Start: 2022-07-14 | End: 2022-08-12 | Stop reason: SDUPTHER

## 2022-07-14 RX ORDER — LEVOTHYROXINE SODIUM 88 UG/1
88 TABLET ORAL
Qty: 90 TABLET | Refills: 1 | Status: CANCELLED | OUTPATIENT
Start: 2022-07-14 | End: 2023-07-14

## 2022-07-14 NOTE — PROGRESS NOTES
New Clinic Note    Nikkie Brown is a 57 y.o. female     CC:   Chief Complaint   Patient presents with    Annual Exam     Patient stated she is here for her 6 month general health evaluation, renewal of prescriptions sent into [facility], and is not fasting this am.     Anxiety    Depression     Going to [place] for depression.    Hypertension    Medication Refill    Hypothyroidism     Stated it is time to have her TSH drawn.        Subjective      Presents to clinic for follow up on chronic health problems. She needs refills.     Hypertension:    Takes medications as directed: yes  Checks blood pressure outside of clinic: yes  On a statin: no  Cardiovascular exercise: no  Heart healthy diet: no        Current Outpatient Medications:     amitriptyline (ELAVIL) 75 MG tablet, Take 75 mg by mouth nightly., Disp: , Rfl:     ARIPiprazole (ABILIFY) 15 MG Tab, Take 15 mg by mouth once daily., Disp: , Rfl:     busPIRone (BUSPAR) 30 MG Tab, Take 30 mg by mouth 2 (two) times daily., Disp: , Rfl:     levothyroxine (SYNTHROID) 88 MCG tablet, Take 1 tablet (88 mcg total) by mouth before breakfast., Disp: 30 tablet, Rfl: 5    traZODone (DESYREL) 300 MG tablet, Take 300 mg by mouth nightly., Disp: , Rfl:     gabapentin (NEURONTIN) 100 MG capsule, Take 1 capsule (100 mg total) by mouth 3 (three) times daily., Disp: 270 capsule, Rfl: 1    hydrALAZINE (APRESOLINE) 25 MG tablet, Take 1 tablet (25 mg total) by mouth 3 (three) times daily., Disp: 270 tablet, Rfl: 1    hydroCHLOROthiazide (HYDRODIURIL) 25 MG tablet, Take 1 tablet (25 mg total) by mouth once daily., Disp: 90 tablet, Rfl: 1    lisinopriL (PRINIVIL,ZESTRIL) 40 MG tablet, Take 1 tablet (40 mg total) by mouth once daily., Disp: 90 tablet, Rfl: 1    naproxen (NAPROSYN) 500 MG tablet, Take 1 tablet (500 mg total) by mouth 2 (two) times daily., Disp: 180 tablet, Rfl: 1     Past Medical History:   Diagnosis Date    Anxiety     Bipolar disorder     Hypertension   "   Hypothyroidism     Polyp of descending colon 05/21/2021    Rectal polyp 05/21/2021    Screening for malignant neoplasm of colon 05/21/2021        Family History   Problem Relation Age of Onset    Hypertension Father     Mental retardation Father     Cancer Father     Hypertension Sister     Mental retardation Sister     Stroke Maternal Grandmother     Heart disease Maternal Grandmother     No Known Problems Brother         Past Surgical History:   Procedure Laterality Date     left wrist repair      ANKLE SURGERY Left 2001    broke ankle has a plate in this ankle    APPLICATION OF EXTERNAL FIXATION DEVICE TO UPPER EXTREMITY Left 03/03/2022    Procedure: APPLICATION, EXTERNAL FIXATION DEVICE, SMALL, HAND OR WRIST;  Surgeon: Vidal Hernandez MD;  Location: [facility];  Service: Orthopedics;  Laterality: Left;    FRACTURE SURGERY      TUBAL LIGATION  1997        Review of Systems   Constitutional: Negative for fatigue and fever.   HENT: Negative for ear pain, postnasal drip, rhinorrhea and sinus pressure/congestion.    Respiratory: Negative for cough and shortness of breath.    Cardiovascular: Negative for chest pain.   Gastrointestinal: Negative for abdominal pain, diarrhea, nausea and vomiting.   Genitourinary: Negative for dysuria.   Neurological: Negative for headaches.        /85 (BP Location: Left arm, Patient Position: Sitting, BP Method: Large (Automatic))   Pulse 90   Temp 99.5 °F (37.5 °C) (Oral)   Resp 18   Ht 5' 7" (1.702 m)   Wt 97.1 kg (214 lb)   SpO2 98%   BMI 33.52 kg/m²      Physical Exam  HENT:      Head: Normocephalic and atraumatic.   Cardiovascular:      Rate and Rhythm: Normal rate and regular rhythm.   Pulmonary:      Effort: Pulmonary effort is normal.      Breath sounds: Normal breath sounds.   Neurological:      Mental Status: She is alert and oriented to person, place, and time.   Psychiatric:         Mood and Affect: Mood normal.         Behavior: " Behavior normal.          Assessment and Plan      ICD-10-CM ICD-9-CM   1. Essential hypertension  I10 401.9   2. Hypothyroidism, unspecified type  E03.9 244.9   3. Dorsalgia, unspecified  M54.9 724.5        Problem List Items Addressed This Visit        Cardiac/Vascular    Essential hypertension (Chronic)    Relevant Medications    hydrALAZINE (APRESOLINE) 25 MG tablet    hydroCHLOROthiazide (HYDRODIURIL) 25 MG tablet    lisinopriL (PRINIVIL,ZESTRIL) 40 MG tablet       Endocrine    Hypothyroidism (Chronic)    Relevant Orders    TSH      Other Visit Diagnoses     Dorsalgia, unspecified        Relevant Medications    gabapentin (NEURONTIN) 100 MG capsule    naproxen (NAPROSYN) 500 MG tablet           Patient Instructions   1. Take Medications as directed  2. Monitor blood pressure outside of clinic  3. Eat a heart healthy diet and get plenty of cardiovascular exercise.         Follow up in about 6 months (around 1/14/2023).

## 2022-08-12 ENCOUNTER — OFFICE VISIT (OUTPATIENT)
Dept: FAMILY MEDICINE | Facility: CLINIC | Age: 57
End: 2022-08-12
Payer: MEDICARE

## 2022-08-12 VITALS
HEART RATE: 82 BPM | OXYGEN SATURATION: 99 % | SYSTOLIC BLOOD PRESSURE: 103 MMHG | HEIGHT: 67 IN | DIASTOLIC BLOOD PRESSURE: 76 MMHG | BODY MASS INDEX: 34.37 KG/M2 | TEMPERATURE: 99 F | WEIGHT: 219 LBS | RESPIRATION RATE: 18 BRPM

## 2022-08-12 DIAGNOSIS — E03.9 HYPOTHYROIDISM, UNSPECIFIED TYPE: ICD-10-CM

## 2022-08-12 DIAGNOSIS — I10 ESSENTIAL HYPERTENSION: ICD-10-CM

## 2022-08-12 DIAGNOSIS — M54.9 DORSALGIA, UNSPECIFIED: ICD-10-CM

## 2022-08-12 LAB
ALBUMIN SERPL BCP-MCNC: 3.8 G/DL (ref 3.5–5)
ALBUMIN/GLOB SERPL: 1 {RATIO}
ALP SERPL-CCNC: 112 U/L (ref 46–118)
ALT SERPL W P-5'-P-CCNC: 25 U/L (ref 13–56)
ANION GAP SERPL CALCULATED.3IONS-SCNC: 10 MMOL/L (ref 7–16)
AST SERPL W P-5'-P-CCNC: 20 U/L (ref 15–37)
BASOPHILS # BLD AUTO: 0.04 K/UL (ref 0–0.2)
BASOPHILS NFR BLD AUTO: 0.4 % (ref 0–1)
BILIRUB SERPL-MCNC: 0.2 MG/DL (ref 0–1.2)
BUN SERPL-MCNC: 14 MG/DL (ref 7–18)
BUN/CREAT SERPL: 9 (ref 6–20)
CALCIUM SERPL-MCNC: 9.4 MG/DL (ref 8.5–10.1)
CHLORIDE SERPL-SCNC: 102 MMOL/L (ref 98–107)
CO2 SERPL-SCNC: 26 MMOL/L (ref 21–32)
CREAT SERPL-MCNC: 1.55 MG/DL (ref 0.55–1.02)
DIFFERENTIAL METHOD BLD: ABNORMAL
EGFR (NO RACE VARIABLE) (RUSH/TITUS): 39 ML/MIN/1.73M²
EOSINOPHIL # BLD AUTO: 0.11 K/UL (ref 0–0.5)
EOSINOPHIL NFR BLD AUTO: 1.1 % (ref 1–4)
ERYTHROCYTE [DISTWIDTH] IN BLOOD BY AUTOMATED COUNT: 15.6 % (ref 11.5–14.5)
GLOBULIN SER-MCNC: 4 G/DL (ref 2–4)
GLUCOSE SERPL-MCNC: 76 MG/DL (ref 74–106)
HCT VFR BLD AUTO: 37.3 % (ref 38–47)
HGB BLD-MCNC: 12.5 G/DL (ref 12–16)
IMM GRANULOCYTES # BLD AUTO: 0.02 K/UL (ref 0–0.04)
IMM GRANULOCYTES NFR BLD: 0.2 % (ref 0–0.4)
LYMPHOCYTES # BLD AUTO: 5 K/UL (ref 1–4.8)
LYMPHOCYTES NFR BLD AUTO: 51.2 % (ref 27–41)
MCH RBC QN AUTO: 29.1 PG (ref 27–31)
MCHC RBC AUTO-ENTMCNC: 33.5 G/DL (ref 32–36)
MCV RBC AUTO: 86.7 FL (ref 80–96)
MONOCYTES # BLD AUTO: 0.5 K/UL (ref 0–0.8)
MONOCYTES NFR BLD AUTO: 5.1 % (ref 2–6)
MPC BLD CALC-MCNC: 10.2 FL (ref 9.4–12.4)
NEUTROPHILS # BLD AUTO: 4.1 K/UL (ref 1.8–7.7)
NEUTROPHILS NFR BLD AUTO: 42 % (ref 53–65)
NRBC # BLD AUTO: 0 X10E3/UL
NRBC, AUTO (.00): 0 %
PLATELET # BLD AUTO: 389 K/UL (ref 150–400)
POTASSIUM SERPL-SCNC: 4.4 MMOL/L (ref 3.5–5.1)
PROT SERPL-MCNC: 7.8 G/DL (ref 6.4–8.2)
RBC # BLD AUTO: 4.3 M/UL (ref 4.2–5.4)
SODIUM SERPL-SCNC: 134 MMOL/L (ref 136–145)
WBC # BLD AUTO: 9.77 K/UL (ref 4.5–11)

## 2022-08-12 PROCEDURE — 4010F ACE/ARB THERAPY RXD/TAKEN: CPT | Mod: ,,, | Performed by: FAMILY MEDICINE

## 2022-08-12 PROCEDURE — 80053 COMPREHEN METABOLIC PANEL: CPT | Mod: ,,, | Performed by: CLINICAL MEDICAL LABORATORY

## 2022-08-12 PROCEDURE — 3078F PR MOST RECENT DIASTOLIC BLOOD PRESSURE < 80 MM HG: ICD-10-PCS | Mod: ,,, | Performed by: FAMILY MEDICINE

## 2022-08-12 PROCEDURE — 1159F PR MEDICATION LIST DOCUMENTED IN MEDICAL RECORD: ICD-10-PCS | Mod: ,,, | Performed by: FAMILY MEDICINE

## 2022-08-12 PROCEDURE — 3074F PR MOST RECENT SYSTOLIC BLOOD PRESSURE < 130 MM HG: ICD-10-PCS | Mod: ,,, | Performed by: FAMILY MEDICINE

## 2022-08-12 PROCEDURE — 1160F RVW MEDS BY RX/DR IN RCRD: CPT | Mod: ,,, | Performed by: FAMILY MEDICINE

## 2022-08-12 PROCEDURE — 1160F PR REVIEW ALL MEDS BY PRESCRIBER/CLIN PHARMACIST DOCUMENTED: ICD-10-PCS | Mod: ,,, | Performed by: FAMILY MEDICINE

## 2022-08-12 PROCEDURE — 1159F MED LIST DOCD IN RCRD: CPT | Mod: ,,, | Performed by: FAMILY MEDICINE

## 2022-08-12 PROCEDURE — 4010F PR ACE/ARB THEARPY RXD/TAKEN: ICD-10-PCS | Mod: ,,, | Performed by: FAMILY MEDICINE

## 2022-08-12 PROCEDURE — 3074F SYST BP LT 130 MM HG: CPT | Mod: ,,, | Performed by: FAMILY MEDICINE

## 2022-08-12 PROCEDURE — 80053 COMPREHENSIVE METABOLIC PANEL: ICD-10-PCS | Mod: ,,, | Performed by: CLINICAL MEDICAL LABORATORY

## 2022-08-12 PROCEDURE — 3078F DIAST BP <80 MM HG: CPT | Mod: ,,, | Performed by: FAMILY MEDICINE

## 2022-08-12 PROCEDURE — 85025 CBC WITH DIFFERENTIAL: ICD-10-PCS | Mod: ,,, | Performed by: CLINICAL MEDICAL LABORATORY

## 2022-08-12 PROCEDURE — 99214 OFFICE O/P EST MOD 30 MIN: CPT | Mod: ,,, | Performed by: FAMILY MEDICINE

## 2022-08-12 PROCEDURE — 85025 COMPLETE CBC W/AUTO DIFF WBC: CPT | Mod: ,,, | Performed by: CLINICAL MEDICAL LABORATORY

## 2022-08-12 PROCEDURE — 3008F PR BODY MASS INDEX (BMI) DOCUMENTED: ICD-10-PCS | Mod: ,,, | Performed by: FAMILY MEDICINE

## 2022-08-12 PROCEDURE — 3008F BODY MASS INDEX DOCD: CPT | Mod: ,,, | Performed by: FAMILY MEDICINE

## 2022-08-12 PROCEDURE — 99214 PR OFFICE/OUTPT VISIT, EST, LEVL IV, 30-39 MIN: ICD-10-PCS | Mod: ,,, | Performed by: FAMILY MEDICINE

## 2022-08-12 RX ORDER — HYDROCHLOROTHIAZIDE 25 MG/1
25 TABLET ORAL DAILY
Qty: 90 TABLET | Refills: 1 | Status: SHIPPED | OUTPATIENT
Start: 2022-08-12 | End: 2023-06-22

## 2022-08-12 RX ORDER — BUSPIRONE HYDROCHLORIDE 30 MG/1
30 TABLET ORAL 2 TIMES DAILY
Qty: 180 TABLET | Refills: 1 | Status: SHIPPED | OUTPATIENT
Start: 2022-08-12 | End: 2023-02-20 | Stop reason: SDUPTHER

## 2022-08-12 RX ORDER — NAPROXEN 500 MG/1
500 TABLET ORAL 2 TIMES DAILY
Qty: 180 TABLET | Refills: 1 | Status: SHIPPED | OUTPATIENT
Start: 2022-08-12 | End: 2023-02-20 | Stop reason: SDUPTHER

## 2022-08-12 RX ORDER — LEVOTHYROXINE SODIUM 88 UG/1
88 TABLET ORAL
Qty: 90 TABLET | Refills: 1 | Status: SHIPPED | OUTPATIENT
Start: 2022-08-12 | End: 2022-12-05

## 2022-08-12 RX ORDER — AMITRIPTYLINE HYDROCHLORIDE 75 MG/1
75 TABLET ORAL NIGHTLY
Qty: 90 TABLET | Refills: 1 | Status: SHIPPED | OUTPATIENT
Start: 2022-08-12 | End: 2023-02-20 | Stop reason: SDUPTHER

## 2022-08-12 RX ORDER — TRAZODONE HYDROCHLORIDE 300 MG/1
300 TABLET ORAL NIGHTLY
Qty: 90 TABLET | Refills: 1 | Status: SHIPPED | OUTPATIENT
Start: 2022-08-12 | End: 2023-08-21 | Stop reason: SDUPTHER

## 2022-08-12 RX ORDER — HYDRALAZINE HYDROCHLORIDE 25 MG/1
25 TABLET, FILM COATED ORAL 3 TIMES DAILY
Qty: 270 TABLET | Refills: 1 | Status: SHIPPED | OUTPATIENT
Start: 2022-08-12 | End: 2023-02-20 | Stop reason: SDUPTHER

## 2022-08-12 RX ORDER — GABAPENTIN 100 MG/1
100 CAPSULE ORAL 3 TIMES DAILY
Qty: 270 CAPSULE | Refills: 1 | Status: SHIPPED | OUTPATIENT
Start: 2022-08-12 | End: 2023-01-25

## 2022-08-12 RX ORDER — ARIPIPRAZOLE 15 MG/1
15 TABLET ORAL DAILY
Qty: 90 TABLET | Refills: 1 | Status: SHIPPED | OUTPATIENT
Start: 2022-08-12 | End: 2023-02-20 | Stop reason: SDUPTHER

## 2022-08-12 RX ORDER — LISINOPRIL 40 MG/1
40 TABLET ORAL DAILY
Qty: 90 TABLET | Refills: 1 | Status: SHIPPED | OUTPATIENT
Start: 2022-08-12 | End: 2023-06-22

## 2022-08-12 NOTE — PROGRESS NOTES
New Clinic Note    Nikkie Brown is a 57 y.o. female     CC:   Chief Complaint   Patient presents with    Annual Exam     Patient stated she is here for her general 6 month health evaluation, renewal of prescriptions sent into [facility], and is not fasting for labs.     Hypertension    Anxiety    Depression    Osteoarthritis    Hypothyroidism    Medication Refill        Subjective      Presents to clinic for follow up on chronic health problems. She needs refills and labs.     Hypertension:    Takes medications as directed: yes  Checks blood pressure outside of clinic: yes  On a statin: no  Cardiovascular exercise: no  Heart healthy diet: no        Current Outpatient Medications:     amitriptyline (ELAVIL) 75 MG tablet, Take 1 tablet (75 mg total) by mouth nightly., Disp: 90 tablet, Rfl: 1    ARIPiprazole (ABILIFY) 15 MG Tab, Take 1 tablet (15 mg total) by mouth once daily., Disp: 90 tablet, Rfl: 1    busPIRone (BUSPAR) 30 MG Tab, Take 1 tablet (30 mg total) by mouth 2 (two) times daily., Disp: 180 tablet, Rfl: 1    gabapentin (NEURONTIN) 100 MG capsule, Take 1 capsule (100 mg total) by mouth 3 (three) times daily., Disp: 270 capsule, Rfl: 1    hydrALAZINE (APRESOLINE) 25 MG tablet, Take 1 tablet (25 mg total) by mouth 3 (three) times daily., Disp: 270 tablet, Rfl: 1    hydroCHLOROthiazide (HYDRODIURIL) 25 MG tablet, Take 1 tablet (25 mg total) by mouth once daily., Disp: 90 tablet, Rfl: 1    levothyroxine (SYNTHROID) 88 MCG tablet, Take 1 tablet (88 mcg total) by mouth before breakfast., Disp: 90 tablet, Rfl: 1    lisinopriL (PRINIVIL,ZESTRIL) 40 MG tablet, Take 1 tablet (40 mg total) by mouth once daily., Disp: 90 tablet, Rfl: 1    naproxen (NAPROSYN) 500 MG tablet, Take 1 tablet (500 mg total) by mouth 2 (two) times daily., Disp: 180 tablet, Rfl: 1    traZODone (DESYREL) 300 MG tablet, Take 1 tablet (300 mg total) by mouth nightly., Disp: 90 tablet, Rfl: 1     Past Medical  "History:   Diagnosis Date    Anxiety     Bipolar disorder     Hypertension     Hypothyroidism     Polyp of descending colon 05/21/2021    Rectal polyp 05/21/2021    Screening for malignant neoplasm of colon 05/21/2021        Family History   Problem Relation Age of Onset    Hypertension Father     Mental retardation Father     Cancer Father     Hypertension Sister     Mental retardation Sister     Stroke Maternal Grandmother     Heart disease Maternal Grandmother     No Known Problems Brother         Past Surgical History:   Procedure Laterality Date     left wrist repair      ANKLE SURGERY Left 2001    broke ankle has a plate in this ankle    APPLICATION OF EXTERNAL FIXATION DEVICE TO UPPER EXTREMITY Left 03/03/2022    Procedure: APPLICATION, EXTERNAL FIXATION DEVICE, SMALL, HAND OR WRIST;  Surgeon: Vidal Hernandez MD;  Location: [facility];  Service: Orthopedics;  Laterality: Left;    FRACTURE SURGERY      TUBAL LIGATION  1997        Review of Systems   Constitutional: Negative for fatigue and fever.   HENT: Negative for ear pain, postnasal drip, rhinorrhea and sinus pressure/congestion.    Respiratory: Negative for cough and shortness of breath.    Cardiovascular: Negative for chest pain.   Gastrointestinal: Negative for abdominal pain, diarrhea, nausea and vomiting.   Genitourinary: Negative for dysuria.   Neurological: Negative for headaches.        /76 (BP Location: Left arm, Patient Position: Sitting, BP Method: Large (Automatic))   Pulse 82   Temp 98.5 °F (36.9 °C) (Oral)   Resp 18   Ht 5' 7" (1.702 m)   Wt 99.3 kg (219 lb)   SpO2 99%   BMI 34.30 kg/m²      Physical Exam  HENT:      Head: Normocephalic and atraumatic.   Cardiovascular:      Rate and Rhythm: Normal rate and regular rhythm.   Pulmonary:      Effort: Pulmonary effort is normal.      Breath sounds: Normal breath sounds.   Neurological:      Mental Status: She is alert and oriented to person, place, and " time.   Psychiatric:         Mood and Affect: Mood normal.         Behavior: Behavior normal.          Assessment and Plan      ICD-10-CM ICD-9-CM   1. Dorsalgia, unspecified  M54.9 724.5   2. Essential hypertension  I10 401.9   3. Hypothyroidism, unspecified type  E03.9 244.9        Problem List Items Addressed This Visit        Cardiac/Vascular    Essential hypertension (Chronic)    Relevant Medications    hydrALAZINE (APRESOLINE) 25 MG tablet    hydroCHLOROthiazide (HYDRODIURIL) 25 MG tablet    lisinopriL (PRINIVIL,ZESTRIL) 40 MG tablet    Other Relevant Orders    CBC Auto Differential    Comprehensive Metabolic Panel       Endocrine    Hypothyroidism (Chronic)    Relevant Medications    levothyroxine (SYNTHROID) 88 MCG tablet      Other Visit Diagnoses     Dorsalgia, unspecified        Relevant Medications    gabapentin (NEURONTIN) 100 MG capsule    naproxen (NAPROSYN) 500 MG tablet           Patient Instructions   1. Take Medications as directed  2. Monitor blood pressure outside of clinic  3. Eat a heart healthy diet and get plenty of cardiovascular exercise.         Follow up in about 6 months (around 2/12/2023).

## 2022-08-31 ENCOUNTER — OFFICE VISIT (OUTPATIENT)
Dept: FAMILY MEDICINE | Facility: CLINIC | Age: 57
End: 2022-08-31
Payer: MEDICARE

## 2022-08-31 VITALS
OXYGEN SATURATION: 100 % | DIASTOLIC BLOOD PRESSURE: 94 MMHG | HEART RATE: 72 BPM | RESPIRATION RATE: 18 BRPM | WEIGHT: 219 LBS | HEIGHT: 67 IN | BODY MASS INDEX: 34.37 KG/M2 | SYSTOLIC BLOOD PRESSURE: 168 MMHG | TEMPERATURE: 99 F

## 2022-08-31 DIAGNOSIS — R51.9 ACUTE NONINTRACTABLE HEADACHE, UNSPECIFIED HEADACHE TYPE: ICD-10-CM

## 2022-08-31 DIAGNOSIS — I10 ESSENTIAL HYPERTENSION: ICD-10-CM

## 2022-08-31 DIAGNOSIS — D64.9 ANEMIA, UNSPECIFIED TYPE: Primary | ICD-10-CM

## 2022-08-31 PROCEDURE — 1159F MED LIST DOCD IN RCRD: CPT | Mod: ,,, | Performed by: FAMILY MEDICINE

## 2022-08-31 PROCEDURE — 1160F PR REVIEW ALL MEDS BY PRESCRIBER/CLIN PHARMACIST DOCUMENTED: ICD-10-PCS | Mod: ,,, | Performed by: FAMILY MEDICINE

## 2022-08-31 PROCEDURE — 99214 OFFICE O/P EST MOD 30 MIN: CPT | Mod: 25,,, | Performed by: FAMILY MEDICINE

## 2022-08-31 PROCEDURE — 3080F DIAST BP >= 90 MM HG: CPT | Mod: ,,, | Performed by: FAMILY MEDICINE

## 2022-08-31 PROCEDURE — 3080F PR MOST RECENT DIASTOLIC BLOOD PRESSURE >= 90 MM HG: ICD-10-PCS | Mod: ,,, | Performed by: FAMILY MEDICINE

## 2022-08-31 PROCEDURE — 1159F PR MEDICATION LIST DOCUMENTED IN MEDICAL RECORD: ICD-10-PCS | Mod: ,,, | Performed by: FAMILY MEDICINE

## 2022-08-31 PROCEDURE — 96372 PR INJECTION,THERAP/PROPH/DIAG2ST, IM OR SUBCUT: ICD-10-PCS | Mod: ,,, | Performed by: FAMILY MEDICINE

## 2022-08-31 PROCEDURE — 3077F SYST BP >= 140 MM HG: CPT | Mod: ,,, | Performed by: FAMILY MEDICINE

## 2022-08-31 PROCEDURE — 4010F PR ACE/ARB THEARPY RXD/TAKEN: ICD-10-PCS | Mod: ,,, | Performed by: FAMILY MEDICINE

## 2022-08-31 PROCEDURE — 96372 THER/PROPH/DIAG INJ SC/IM: CPT | Mod: ,,, | Performed by: FAMILY MEDICINE

## 2022-08-31 PROCEDURE — 3077F PR MOST RECENT SYSTOLIC BLOOD PRESSURE >= 140 MM HG: ICD-10-PCS | Mod: ,,, | Performed by: FAMILY MEDICINE

## 2022-08-31 PROCEDURE — 1160F RVW MEDS BY RX/DR IN RCRD: CPT | Mod: ,,, | Performed by: FAMILY MEDICINE

## 2022-08-31 PROCEDURE — 3008F PR BODY MASS INDEX (BMI) DOCUMENTED: ICD-10-PCS | Mod: ,,, | Performed by: FAMILY MEDICINE

## 2022-08-31 PROCEDURE — 3008F BODY MASS INDEX DOCD: CPT | Mod: ,,, | Performed by: FAMILY MEDICINE

## 2022-08-31 PROCEDURE — 4010F ACE/ARB THERAPY RXD/TAKEN: CPT | Mod: ,,, | Performed by: FAMILY MEDICINE

## 2022-08-31 PROCEDURE — 99214 PR OFFICE/OUTPT VISIT, EST, LEVL IV, 30-39 MIN: ICD-10-PCS | Mod: 25,,, | Performed by: FAMILY MEDICINE

## 2022-08-31 RX ORDER — FERROUS SULFATE 325(65) MG
325 TABLET, DELAYED RELEASE (ENTERIC COATED) ORAL
Qty: 30 TABLET | Refills: 2 | Status: SHIPPED | OUTPATIENT
Start: 2022-08-31 | End: 2023-01-25

## 2022-08-31 RX ORDER — KETOROLAC TROMETHAMINE 30 MG/ML
30 INJECTION, SOLUTION INTRAMUSCULAR; INTRAVENOUS
Status: COMPLETED | OUTPATIENT
Start: 2022-08-31 | End: 2022-08-31

## 2022-08-31 RX ADMIN — KETOROLAC TROMETHAMINE 30 MG: 30 INJECTION, SOLUTION INTRAMUSCULAR; INTRAVENOUS at 04:08

## 2022-08-31 NOTE — PROGRESS NOTES
New Clinic Note    Nikkie Brown is a 57 y.o. female     CC:   Chief Complaint   Patient presents with    Fatigue     Was trying to donate blood at the [facility] and was told she was anemic. Never had a Hepatitis C Screen.     Hypertension     Stated she has been checking her blood pressure and it has been staying up. Patient is on Disability due to schizophrenia. Stated she has not had an episode in over 3 years now. Lives by herself.     Headache     Complains of frontal headache every morning off and on for past 2 weeks. Patient is a smoker. Stated she thinks her blood pressure is elevated because of the pain in her head. Last Eye Exam was beginning of this year at [facility].         Subjective  Patient complains of a headache for 2 weeks. She has not taken anything for it. She is concerned that it is caused by her blood pressure. She has a history of anemia. She needs her iron refilled. She has not been taking it recently.     Presents to clinic for follow up on chronic health problems    Hypertension:    Takes medications as directed: yes  Checks blood pressure outside of clinic: yes  On a statin: no  Cardiovascular exercise: no  Heart healthy diet: no          Current Outpatient Medications:     amitriptyline (ELAVIL) 75 MG tablet, Take 1 tablet (75 mg total) by mouth nightly., Disp: 90 tablet, Rfl: 1    ARIPiprazole (ABILIFY) 15 MG Tab, Take 1 tablet (15 mg total) by mouth once daily., Disp: 90 tablet, Rfl: 1    busPIRone (BUSPAR) 30 MG Tab, Take 1 tablet (30 mg total) by mouth 2 (two) times daily., Disp: 180 tablet, Rfl: 1    gabapentin (NEURONTIN) 100 MG capsule, Take 1 capsule (100 mg total) by mouth 3 (three) times daily., Disp: 270 capsule, Rfl: 1    hydrALAZINE (APRESOLINE) 25 MG tablet, Take 1 tablet (25 mg total) by mouth 3 (three) times daily. (Patient taking differently: Take 25 mg by mouth 3 (three) times daily. PT STATES TAKES TWICE A DAY), Disp: 270 tablet, Rfl: 1     hydroCHLOROthiazide (HYDRODIURIL) 25 MG tablet, Take 1 tablet (25 mg total) by mouth once daily., Disp: 90 tablet, Rfl: 1    levothyroxine (SYNTHROID) 88 MCG tablet, Take 1 tablet (88 mcg total) by mouth before breakfast., Disp: 90 tablet, Rfl: 1    lisinopriL (PRINIVIL,ZESTRIL) 40 MG tablet, Take 1 tablet (40 mg total) by mouth once daily., Disp: 90 tablet, Rfl: 1    naproxen (NAPROSYN) 500 MG tablet, Take 1 tablet (500 mg total) by mouth 2 (two) times daily., Disp: 180 tablet, Rfl: 1    traZODone (DESYREL) 300 MG tablet, Take 1 tablet (300 mg total) by mouth nightly., Disp: 90 tablet, Rfl: 1    ferrous sulfate 325 (65 FE) MG EC tablet, Take 1 tablet (325 mg total) by mouth 3 (three) times daily with meals., Disp: 30 tablet, Rfl: 2     Past Medical History:   Diagnosis Date    Anxiety     Bipolar disorder     Hypertension     Hypothyroidism     Polyp of descending colon 05/21/2021    Rectal polyp 05/21/2021    Screening for malignant neoplasm of colon 05/21/2021        Family History   Problem Relation Age of Onset    Hypertension Father     Mental retardation Father     Cancer Father     Hypertension Sister     Mental retardation Sister     Stroke Maternal Grandmother     Heart disease Maternal Grandmother     No Known Problems Brother         Past Surgical History:   Procedure Laterality Date     left wrist repair      ANKLE SURGERY Left 2001    broke ankle has a plate in this ankle    APPLICATION OF EXTERNAL FIXATION DEVICE TO UPPER EXTREMITY Left 03/03/2022    Procedure: APPLICATION, EXTERNAL FIXATION DEVICE, SMALL, HAND OR WRIST;  Surgeon: Vidal Hernandez MD;  Location: [facility];  Service: Orthopedics;  Laterality: Left;    FRACTURE SURGERY      TUBAL LIGATION  1997        Review of Systems   Constitutional:  Positive for fatigue. Negative for fever.   HENT:  Negative for ear pain, postnasal drip, rhinorrhea and sinus pressure/congestion.    Respiratory:  Negative for cough and shortness of breath.   "  Cardiovascular:  Negative for chest pain.   Gastrointestinal:  Negative for abdominal pain, diarrhea, nausea and vomiting.   Genitourinary:  Negative for dysuria.   Neurological:  Positive for headaches.      BP (!) 168/94 (BP Location: Right arm, Patient Position: Sitting, BP Method: Large (Automatic))   Pulse 72   Temp 99 °F (37.2 °C) (Oral)   Resp 18   Ht 5' 7" (1.702 m)   Wt 99.3 kg (219 lb)   SpO2 100%   BMI 34.30 kg/m²      Physical Exam  HENT:      Head: Normocephalic and atraumatic.      Mouth/Throat:      Pharynx: Oropharynx is clear.   Cardiovascular:      Rate and Rhythm: Normal rate and regular rhythm.   Pulmonary:      Effort: Pulmonary effort is normal.      Breath sounds: Normal breath sounds.   Neurological:      Mental Status: She is alert and oriented to person, place, and time.   Psychiatric:         Mood and Affect: Mood normal.         Behavior: Behavior normal.        Assessment and Plan      ICD-10-CM ICD-9-CM   1. Anemia, unspecified type  D64.9 285.9   2. Essential hypertension  I10 401.9   3. Acute nonintractable headache, unspecified headache type  R51.9 784.0        Problem List Items Addressed This Visit          Cardiac/Vascular    Essential hypertension (Chronic)     Other Visit Diagnoses       Anemia, unspecified type    -  Primary    Relevant Medications    ferrous sulfate 325 (65 FE) MG EC tablet    Acute nonintractable headache, unspecified headache type               Patient has not been checking her blood pressure at home. Suspect headache could be causing elevated pressure today. Will treat headache. She will record her blood pressure at home. She will return in 1 week with blood pressure log.     Patient Instructions   Take Medications as directed  Monitor blood pressure outside of clinic  Eat a heart healthy diet and get plenty of cardiovascular exercise.       Follow up in about 1 week (around 9/7/2022), or if symptoms worsen or fail to improve.          "

## 2022-09-08 ENCOUNTER — OFFICE VISIT (OUTPATIENT)
Dept: FAMILY MEDICINE | Facility: CLINIC | Age: 57
End: 2022-09-08
Payer: MEDICARE

## 2022-09-08 VITALS
RESPIRATION RATE: 20 BRPM | DIASTOLIC BLOOD PRESSURE: 71 MMHG | BODY MASS INDEX: 35.82 KG/M2 | SYSTOLIC BLOOD PRESSURE: 111 MMHG | WEIGHT: 228.19 LBS | HEART RATE: 81 BPM | TEMPERATURE: 98 F | OXYGEN SATURATION: 97 % | HEIGHT: 67 IN

## 2022-09-08 DIAGNOSIS — I10 ESSENTIAL HYPERTENSION: Primary | ICD-10-CM

## 2022-09-08 PROCEDURE — 3078F DIAST BP <80 MM HG: CPT | Mod: ,,, | Performed by: FAMILY MEDICINE

## 2022-09-08 PROCEDURE — 99213 OFFICE O/P EST LOW 20 MIN: CPT | Mod: 25,,, | Performed by: FAMILY MEDICINE

## 2022-09-08 PROCEDURE — 1159F PR MEDICATION LIST DOCUMENTED IN MEDICAL RECORD: ICD-10-PCS | Mod: ,,, | Performed by: FAMILY MEDICINE

## 2022-09-08 PROCEDURE — G0008 ADMIN INFLUENZA VIRUS VAC: HCPCS | Mod: ,,, | Performed by: FAMILY MEDICINE

## 2022-09-08 PROCEDURE — 1160F RVW MEDS BY RX/DR IN RCRD: CPT | Mod: ,,, | Performed by: FAMILY MEDICINE

## 2022-09-08 PROCEDURE — 3078F PR MOST RECENT DIASTOLIC BLOOD PRESSURE < 80 MM HG: ICD-10-PCS | Mod: ,,, | Performed by: FAMILY MEDICINE

## 2022-09-08 PROCEDURE — 99213 PR OFFICE/OUTPT VISIT, EST, LEVL III, 20-29 MIN: ICD-10-PCS | Mod: 25,,, | Performed by: FAMILY MEDICINE

## 2022-09-08 PROCEDURE — 3074F SYST BP LT 130 MM HG: CPT | Mod: ,,, | Performed by: FAMILY MEDICINE

## 2022-09-08 PROCEDURE — 90686 IIV4 VACC NO PRSV 0.5 ML IM: CPT | Mod: ,,, | Performed by: FAMILY MEDICINE

## 2022-09-08 PROCEDURE — 1159F MED LIST DOCD IN RCRD: CPT | Mod: ,,, | Performed by: FAMILY MEDICINE

## 2022-09-08 PROCEDURE — 3008F PR BODY MASS INDEX (BMI) DOCUMENTED: ICD-10-PCS | Mod: ,,, | Performed by: FAMILY MEDICINE

## 2022-09-08 PROCEDURE — G0008 FLU VACCINE (QUAD) GREATER THAN OR EQUAL TO 3YO PRESERVATIVE FREE IM: ICD-10-PCS | Mod: ,,, | Performed by: FAMILY MEDICINE

## 2022-09-08 PROCEDURE — 90686 FLU VACCINE (QUAD) GREATER THAN OR EQUAL TO 3YO PRESERVATIVE FREE IM: ICD-10-PCS | Mod: ,,, | Performed by: FAMILY MEDICINE

## 2022-09-08 PROCEDURE — 4010F PR ACE/ARB THEARPY RXD/TAKEN: ICD-10-PCS | Mod: ,,, | Performed by: FAMILY MEDICINE

## 2022-09-08 PROCEDURE — 1160F PR REVIEW ALL MEDS BY PRESCRIBER/CLIN PHARMACIST DOCUMENTED: ICD-10-PCS | Mod: ,,, | Performed by: FAMILY MEDICINE

## 2022-09-08 PROCEDURE — 3074F PR MOST RECENT SYSTOLIC BLOOD PRESSURE < 130 MM HG: ICD-10-PCS | Mod: ,,, | Performed by: FAMILY MEDICINE

## 2022-09-08 PROCEDURE — 3008F BODY MASS INDEX DOCD: CPT | Mod: ,,, | Performed by: FAMILY MEDICINE

## 2022-09-08 PROCEDURE — 4010F ACE/ARB THERAPY RXD/TAKEN: CPT | Mod: ,,, | Performed by: FAMILY MEDICINE

## 2022-09-08 NOTE — PROGRESS NOTES
New Clinic Note    Nikkie Brown is a 57 y.o. female     CC:   Chief Complaint   Patient presents with    Follow-up     1 WEEK F/U BLOOD PRESSURE         Subjective  Patient is here for a 1 week follow-up on blood pressure.  She is tolerating her medications well.  She reports her blood pressure has been controlled at home.  Patient reports that her headaches are gone.    Presents to clinic for follow up on chronic health problems    Hypertension:    Takes medications as directed: yes  Checks blood pressure outside of clinic: yes  On a statin: no  Cardiovascular exercise: no  Heart healthy diet: no        Current Outpatient Medications:     amitriptyline (ELAVIL) 75 MG tablet, Take 1 tablet (75 mg total) by mouth nightly., Disp: 90 tablet, Rfl: 1    ARIPiprazole (ABILIFY) 15 MG Tab, Take 1 tablet (15 mg total) by mouth once daily., Disp: 90 tablet, Rfl: 1    busPIRone (BUSPAR) 30 MG Tab, Take 1 tablet (30 mg total) by mouth 2 (two) times daily., Disp: 180 tablet, Rfl: 1    ferrous sulfate 325 (65 FE) MG EC tablet, Take 1 tablet (325 mg total) by mouth 3 (three) times daily with meals., Disp: 30 tablet, Rfl: 2    gabapentin (NEURONTIN) 100 MG capsule, Take 1 capsule (100 mg total) by mouth 3 (three) times daily., Disp: 270 capsule, Rfl: 1    hydrALAZINE (APRESOLINE) 25 MG tablet, Take 1 tablet (25 mg total) by mouth 3 (three) times daily. (Patient taking differently: Take 25 mg by mouth 3 (three) times daily. PT STATES TAKES TWICE A DAY), Disp: 270 tablet, Rfl: 1    hydroCHLOROthiazide (HYDRODIURIL) 25 MG tablet, Take 1 tablet (25 mg total) by mouth once daily., Disp: 90 tablet, Rfl: 1    levothyroxine (SYNTHROID) 88 MCG tablet, Take 1 tablet (88 mcg total) by mouth before breakfast., Disp: 90 tablet, Rfl: 1    lisinopriL (PRINIVIL,ZESTRIL) 40 MG tablet, Take 1 tablet (40 mg total) by mouth once daily., Disp: 90 tablet, Rfl: 1    naproxen (NAPROSYN) 500 MG tablet, Take 1 tablet (500 mg total) by mouth 2 (two)  "times daily., Disp: 180 tablet, Rfl: 1    traZODone (DESYREL) 300 MG tablet, Take 1 tablet (300 mg total) by mouth nightly., Disp: 90 tablet, Rfl: 1     Past Medical History:   Diagnosis Date    Anxiety     Bipolar disorder     Hypertension     Hypothyroidism     Polyp of descending colon 05/21/2021    Rectal polyp 05/21/2021    Screening for malignant neoplasm of colon 05/21/2021        Family History   Problem Relation Age of Onset    Hypertension Father     Mental retardation Father     Cancer Father     Hypertension Sister     Mental retardation Sister     Stroke Maternal Grandmother     Heart disease Maternal Grandmother     No Known Problems Brother         Past Surgical History:   Procedure Laterality Date     left wrist repair      ANKLE SURGERY Left 2001    broke ankle has a plate in this ankle    APPLICATION OF EXTERNAL FIXATION DEVICE TO UPPER EXTREMITY Left 03/03/2022    Procedure: APPLICATION, EXTERNAL FIXATION DEVICE, SMALL, HAND OR WRIST;  Surgeon: Vidal Hernandez MD;  Location: [facility];  Service: Orthopedics;  Laterality: Left;    FRACTURE SURGERY      TUBAL LIGATION  1997        Review of Systems   Constitutional:  Negative for fatigue and fever.   HENT:  Negative for ear pain, postnasal drip, rhinorrhea and sinus pressure/congestion.    Respiratory:  Negative for cough and shortness of breath.    Cardiovascular:  Negative for chest pain.   Gastrointestinal:  Negative for abdominal pain, diarrhea, nausea and vomiting.   Genitourinary:  Negative for dysuria.   Neurological:  Negative for headaches.      /71 (BP Location: Right arm, Patient Position: Sitting, BP Method: Large (Automatic))   Pulse 81   Temp 98 °F (36.7 °C) (Oral)   Resp 20   Ht 5' 7" (1.702 m)   Wt 103.5 kg (228 lb 3.2 oz)   SpO2 97%   BMI 35.74 kg/m²      Physical Exam  HENT:      Head: Normocephalic and atraumatic.   Cardiovascular:      Rate and Rhythm: Normal rate and regular rhythm.   Pulmonary:      " Effort: Pulmonary effort is normal.      Breath sounds: Normal breath sounds.   Neurological:      Mental Status: She is alert and oriented to person, place, and time.   Psychiatric:         Mood and Affect: Mood normal.         Behavior: Behavior normal.        Assessment and Plan      ICD-10-CM ICD-9-CM   1. Essential hypertension  I10 401.9        Problem List Items Addressed This Visit          Cardiac/Vascular    Essential hypertension - Primary (Chronic)      Continue current meds    Patient Instructions   Take Medications as directed  Monitor blood pressure outside of clinic  Eat a heart healthy diet and get plenty of cardiovascular exercise.       Follow up in about 6 months (around 3/8/2023).

## 2022-09-28 ENCOUNTER — OFFICE VISIT (OUTPATIENT)
Dept: FAMILY MEDICINE | Facility: CLINIC | Age: 57
End: 2022-09-28
Payer: MEDICARE

## 2022-09-28 VITALS
OXYGEN SATURATION: 99 % | HEART RATE: 95 BPM | HEIGHT: 67 IN | DIASTOLIC BLOOD PRESSURE: 105 MMHG | SYSTOLIC BLOOD PRESSURE: 145 MMHG | TEMPERATURE: 98 F | WEIGHT: 229 LBS | RESPIRATION RATE: 18 BRPM | BODY MASS INDEX: 35.94 KG/M2

## 2022-09-28 DIAGNOSIS — I10 ESSENTIAL HYPERTENSION: Primary | Chronic | ICD-10-CM

## 2022-09-28 DIAGNOSIS — F41.9 ANXIETY: Chronic | ICD-10-CM

## 2022-09-28 PROCEDURE — 99214 PR OFFICE/OUTPT VISIT, EST, LEVL IV, 30-39 MIN: ICD-10-PCS | Mod: ,,, | Performed by: FAMILY MEDICINE

## 2022-09-28 PROCEDURE — 1159F PR MEDICATION LIST DOCUMENTED IN MEDICAL RECORD: ICD-10-PCS | Mod: ,,, | Performed by: FAMILY MEDICINE

## 2022-09-28 PROCEDURE — 1160F PR REVIEW ALL MEDS BY PRESCRIBER/CLIN PHARMACIST DOCUMENTED: ICD-10-PCS | Mod: ,,, | Performed by: FAMILY MEDICINE

## 2022-09-28 PROCEDURE — 99214 OFFICE O/P EST MOD 30 MIN: CPT | Mod: ,,, | Performed by: FAMILY MEDICINE

## 2022-09-28 PROCEDURE — 1160F RVW MEDS BY RX/DR IN RCRD: CPT | Mod: ,,, | Performed by: FAMILY MEDICINE

## 2022-09-28 PROCEDURE — 4010F ACE/ARB THERAPY RXD/TAKEN: CPT | Mod: ,,, | Performed by: FAMILY MEDICINE

## 2022-09-28 PROCEDURE — 4010F PR ACE/ARB THEARPY RXD/TAKEN: ICD-10-PCS | Mod: ,,, | Performed by: FAMILY MEDICINE

## 2022-09-28 PROCEDURE — 3008F BODY MASS INDEX DOCD: CPT | Mod: ,,, | Performed by: FAMILY MEDICINE

## 2022-09-28 PROCEDURE — 3008F PR BODY MASS INDEX (BMI) DOCUMENTED: ICD-10-PCS | Mod: ,,, | Performed by: FAMILY MEDICINE

## 2022-09-28 PROCEDURE — 3077F SYST BP >= 140 MM HG: CPT | Mod: ,,, | Performed by: FAMILY MEDICINE

## 2022-09-28 PROCEDURE — 3080F DIAST BP >= 90 MM HG: CPT | Mod: ,,, | Performed by: FAMILY MEDICINE

## 2022-09-28 PROCEDURE — 3080F PR MOST RECENT DIASTOLIC BLOOD PRESSURE >= 90 MM HG: ICD-10-PCS | Mod: ,,, | Performed by: FAMILY MEDICINE

## 2022-09-28 PROCEDURE — 1159F MED LIST DOCD IN RCRD: CPT | Mod: ,,, | Performed by: FAMILY MEDICINE

## 2022-09-28 PROCEDURE — 3077F PR MOST RECENT SYSTOLIC BLOOD PRESSURE >= 140 MM HG: ICD-10-PCS | Mod: ,,, | Performed by: FAMILY MEDICINE

## 2022-09-28 RX ORDER — METOPROLOL SUCCINATE 25 MG/1
25 TABLET, EXTENDED RELEASE ORAL DAILY
Qty: 30 TABLET | Refills: 2 | Status: SHIPPED | OUTPATIENT
Start: 2022-09-28 | End: 2023-02-03 | Stop reason: SDUPTHER

## 2022-09-28 NOTE — PROGRESS NOTES
New Clinic Note    Nikkie Brown is a 57 y.o. female     CC:   Chief Complaint   Patient presents with    Hypertension     Patient complains of dizzy spell yesterday and elevated blood pressure. She stated she is suppose to be seen by [place] tomorrow due her boyfriend and brother stressing her out. Fatal accident with her son this time of year brings back bad memories and she stated she does not cope with it well. Wants to quit smoking. Smokes one to one and half packs per day for past 40 years. Stated she has never had a low dose CT scan of lungs after all these years of smoking.     Nicotine Dependence     Stated she takes Naproxen twice a day every day.     Anxiety    Depression        Subjective  Patient is here to follow up on her blood pressure. She reports that it is remaining high. She denies headache or chest pain. She says she does get palpitations. She reports that her anxiety is not controlled. She is scheduled to see Eme[facility] tomorrow for this.     Presents to clinic for follow up on chronic health problems    Hypertension:    Takes medications as directed: yes  Checks blood pressure outside of clinic: yes  On a statin: no  Cardiovascular exercise: no  Heart healthy diet: no        Current Outpatient Medications:     amitriptyline (ELAVIL) 75 MG tablet, Take 1 tablet (75 mg total) by mouth nightly., Disp: 90 tablet, Rfl: 1    ARIPiprazole (ABILIFY) 15 MG Tab, Take 1 tablet (15 mg total) by mouth once daily., Disp: 90 tablet, Rfl: 1    busPIRone (BUSPAR) 30 MG Tab, Take 1 tablet (30 mg total) by mouth 2 (two) times daily., Disp: 180 tablet, Rfl: 1    ferrous sulfate 325 (65 FE) MG EC tablet, Take 1 tablet (325 mg total) by mouth 3 (three) times daily with meals., Disp: 30 tablet, Rfl: 2    gabapentin (NEURONTIN) 100 MG capsule, Take 1 capsule (100 mg total) by mouth 3 (three) times daily., Disp: 270 capsule, Rfl: 1    hydrALAZINE (APRESOLINE) 25 MG tablet, Take 1 tablet (25 mg total) by mouth 3 (three)  times daily. (Patient taking differently: Take 25 mg by mouth 3 (three) times daily. PT STATES TAKES TWICE A DAY), Disp: 270 tablet, Rfl: 1    hydroCHLOROthiazide (HYDRODIURIL) 25 MG tablet, Take 1 tablet (25 mg total) by mouth once daily., Disp: 90 tablet, Rfl: 1    levothyroxine (SYNTHROID) 88 MCG tablet, Take 1 tablet (88 mcg total) by mouth before breakfast., Disp: 90 tablet, Rfl: 1    lisinopriL (PRINIVIL,ZESTRIL) 40 MG tablet, Take 1 tablet (40 mg total) by mouth once daily., Disp: 90 tablet, Rfl: 1    naproxen (NAPROSYN) 500 MG tablet, Take 1 tablet (500 mg total) by mouth 2 (two) times daily., Disp: 180 tablet, Rfl: 1    traZODone (DESYREL) 300 MG tablet, Take 1 tablet (300 mg total) by mouth nightly., Disp: 90 tablet, Rfl: 1    metoprolol succinate (TOPROL-XL) 25 MG 24 hr tablet, Take 1 tablet (25 mg total) by mouth once daily., Disp: 30 tablet, Rfl: 2     Past Medical History:   Diagnosis Date    Anxiety     Bipolar disorder     Hypertension     Hypothyroidism     Polyp of descending colon 05/21/2021    Rectal polyp 05/21/2021    Screening for malignant neoplasm of colon 05/21/2021        Family History   Problem Relation Age of Onset    Hypertension Father     Mental retardation Father     Cancer Father     Hypertension Sister     Mental retardation Sister     Stroke Maternal Grandmother     Heart disease Maternal Grandmother     No Known Problems Brother         Past Surgical History:   Procedure Laterality Date     left wrist repair      ANKLE SURGERY Left 2001    broke ankle has a plate in this ankle    APPLICATION OF EXTERNAL FIXATION DEVICE TO UPPER EXTREMITY Left 03/03/2022    Procedure: APPLICATION, EXTERNAL FIXATION DEVICE, SMALL, HAND OR WRIST;  Surgeon: Vidal Hernandez MD;  Location: [facility];  Service: Orthopedics;  Laterality: Left;    FRACTURE SURGERY      TUBAL LIGATION  1997        Review of Systems   Constitutional:  Negative for fatigue and fever.   HENT:  Negative for ear pain,  "postnasal drip, rhinorrhea and sinus pressure/congestion.    Respiratory:  Negative for cough and shortness of breath.    Cardiovascular:  Negative for chest pain.   Gastrointestinal:  Negative for abdominal pain, diarrhea, nausea and vomiting.   Genitourinary:  Negative for dysuria.   Neurological:  Negative for headaches.      BP (!) 145/105 (BP Location: Right arm, Patient Position: Sitting, BP Method: Large (Automatic))   Pulse 95   Temp 98.3 °F (36.8 °C) (Oral)   Resp 18   Ht 5' 7" (1.702 m)   Wt 103.9 kg (229 lb)   SpO2 99%   BMI 35.87 kg/m²      Physical Exam  HENT:      Head: Normocephalic and atraumatic.   Cardiovascular:      Rate and Rhythm: Normal rate and regular rhythm.   Pulmonary:      Effort: Pulmonary effort is normal.      Breath sounds: Normal breath sounds.   Neurological:      Mental Status: She is alert and oriented to person, place, and time.   Psychiatric:         Mood and Affect: Mood normal.         Behavior: Behavior normal.        Assessment and Plan      ICD-10-CM ICD-9-CM   1. Essential hypertension  I10 401.9   2. Anxiety  F41.9 300.00        Problem List Items Addressed This Visit          Psychiatric    Anxiety (Chronic)       Cardiac/Vascular    Essential hypertension - Primary (Chronic)    Relevant Medications    metoprolol succinate (TOPROL-XL) 25 MG 24 hr tablet      Add metoprolol.     Patient Instructions   Take Medications as directed  Monitor blood pressure outside of clinic  Eat a heart healthy diet and get plenty of cardiovascular exercise.       Follow up in about 2 weeks (around 10/12/2022).          "

## 2022-10-19 ENCOUNTER — OFFICE VISIT (OUTPATIENT)
Dept: FAMILY MEDICINE | Facility: CLINIC | Age: 57
End: 2022-10-19
Payer: MEDICARE

## 2022-10-19 VITALS
HEART RATE: 88 BPM | HEIGHT: 67 IN | OXYGEN SATURATION: 99 % | WEIGHT: 232 LBS | BODY MASS INDEX: 36.41 KG/M2 | RESPIRATION RATE: 20 BRPM | DIASTOLIC BLOOD PRESSURE: 80 MMHG | SYSTOLIC BLOOD PRESSURE: 128 MMHG | TEMPERATURE: 98 F

## 2022-10-19 DIAGNOSIS — F41.9 ANXIETY: Chronic | ICD-10-CM

## 2022-10-19 DIAGNOSIS — I10 ESSENTIAL HYPERTENSION: Primary | Chronic | ICD-10-CM

## 2022-10-19 PROBLEM — E66.01 SEVERE OBESITY (BMI 35.0-39.9) WITH COMORBIDITY: Chronic | Status: ACTIVE | Noted: 2022-03-22

## 2022-10-19 PROCEDURE — 4010F PR ACE/ARB THEARPY RXD/TAKEN: ICD-10-PCS | Mod: ,,, | Performed by: FAMILY MEDICINE

## 2022-10-19 PROCEDURE — 3074F SYST BP LT 130 MM HG: CPT | Mod: ,,, | Performed by: FAMILY MEDICINE

## 2022-10-19 PROCEDURE — 4010F ACE/ARB THERAPY RXD/TAKEN: CPT | Mod: ,,, | Performed by: FAMILY MEDICINE

## 2022-10-19 PROCEDURE — 1160F PR REVIEW ALL MEDS BY PRESCRIBER/CLIN PHARMACIST DOCUMENTED: ICD-10-PCS | Mod: ,,, | Performed by: FAMILY MEDICINE

## 2022-10-19 PROCEDURE — 3079F PR MOST RECENT DIASTOLIC BLOOD PRESSURE 80-89 MM HG: ICD-10-PCS | Mod: ,,, | Performed by: FAMILY MEDICINE

## 2022-10-19 PROCEDURE — 1159F PR MEDICATION LIST DOCUMENTED IN MEDICAL RECORD: ICD-10-PCS | Mod: ,,, | Performed by: FAMILY MEDICINE

## 2022-10-19 PROCEDURE — 3074F PR MOST RECENT SYSTOLIC BLOOD PRESSURE < 130 MM HG: ICD-10-PCS | Mod: ,,, | Performed by: FAMILY MEDICINE

## 2022-10-19 PROCEDURE — 3079F DIAST BP 80-89 MM HG: CPT | Mod: ,,, | Performed by: FAMILY MEDICINE

## 2022-10-19 PROCEDURE — 1159F MED LIST DOCD IN RCRD: CPT | Mod: ,,, | Performed by: FAMILY MEDICINE

## 2022-10-19 PROCEDURE — 99214 OFFICE O/P EST MOD 30 MIN: CPT | Mod: ,,, | Performed by: FAMILY MEDICINE

## 2022-10-19 PROCEDURE — 1160F RVW MEDS BY RX/DR IN RCRD: CPT | Mod: ,,, | Performed by: FAMILY MEDICINE

## 2022-10-19 PROCEDURE — 99214 PR OFFICE/OUTPT VISIT, EST, LEVL IV, 30-39 MIN: ICD-10-PCS | Mod: ,,, | Performed by: FAMILY MEDICINE

## 2022-10-19 NOTE — PROGRESS NOTES
New Clinic Note    Nikkie Brown is a 57 y.o. female     CC:   Chief Complaint   Patient presents with    Follow-up     Patient here today for follow up visit for hypertension, she states she has been checking bp at home but did not bring log.     Hypertension        Subjective    History of Present Illness HPI   Patient is here for follow up on hypertension since starting on metoprolol. She is tolerating the new medication well and her blood pressure has been controlled. She has seen [facility] for her anxiety and she reports that it is better.     Current Outpatient Medications:     amitriptyline (ELAVIL) 75 MG tablet, Take 1 tablet (75 mg total) by mouth nightly., Disp: 90 tablet, Rfl: 1    ARIPiprazole (ABILIFY) 15 MG Tab, Take 1 tablet (15 mg total) by mouth once daily., Disp: 90 tablet, Rfl: 1    busPIRone (BUSPAR) 30 MG Tab, Take 1 tablet (30 mg total) by mouth 2 (two) times daily., Disp: 180 tablet, Rfl: 1    ferrous sulfate 325 (65 FE) MG EC tablet, Take 1 tablet (325 mg total) by mouth 3 (three) times daily with meals., Disp: 30 tablet, Rfl: 2    gabapentin (NEURONTIN) 100 MG capsule, Take 1 capsule (100 mg total) by mouth 3 (three) times daily., Disp: 270 capsule, Rfl: 1    hydrALAZINE (APRESOLINE) 25 MG tablet, Take 1 tablet (25 mg total) by mouth 3 (three) times daily. (Patient taking differently: Take 25 mg by mouth 3 (three) times daily. PT STATES TAKES TWICE A DAY), Disp: 270 tablet, Rfl: 1    hydroCHLOROthiazide (HYDRODIURIL) 25 MG tablet, Take 1 tablet (25 mg total) by mouth once daily., Disp: 90 tablet, Rfl: 1    levothyroxine (SYNTHROID) 88 MCG tablet, Take 1 tablet (88 mcg total) by mouth before breakfast., Disp: 90 tablet, Rfl: 1    lisinopriL (PRINIVIL,ZESTRIL) 40 MG tablet, Take 1 tablet (40 mg total) by mouth once daily., Disp: 90 tablet, Rfl: 1    metoprolol succinate (TOPROL-XL) 25 MG 24 hr tablet, Take 1 tablet (25 mg total) by mouth once daily., Disp: 30 tablet, Rfl: 2    naproxen  "(NAPROSYN) 500 MG tablet, Take 1 tablet (500 mg total) by mouth 2 (two) times daily., Disp: 180 tablet, Rfl: 1    traZODone (DESYREL) 300 MG tablet, Take 1 tablet (300 mg total) by mouth nightly., Disp: 90 tablet, Rfl: 1     Past Medical History:   Diagnosis Date    Anxiety     Bipolar disorder     Hypertension     Hypothyroidism     Polyp of descending colon 05/21/2021    Rectal polyp 05/21/2021    Screening for malignant neoplasm of colon 05/21/2021        Family History   Problem Relation Age of Onset    Hypertension Father     Mental retardation Father     Cancer Father     Hypertension Sister     Mental retardation Sister     Stroke Maternal Grandmother     Heart disease Maternal Grandmother     No Known Problems Brother         Past Surgical History:   Procedure Laterality Date     left wrist repair      ANKLE SURGERY Left 2001    broke ankle has a plate in this ankle    APPLICATION OF EXTERNAL FIXATION DEVICE TO UPPER EXTREMITY Left 03/03/2022    Procedure: APPLICATION, EXTERNAL FIXATION DEVICE, SMALL, HAND OR WRIST;  Surgeon: Vidal Hernandez MD;  Location: [facility];  Service: Orthopedics;  Laterality: Left;    FRACTURE SURGERY      TUBAL LIGATION  1997        Review of Systems   Constitutional:  Negative for fatigue and fever.   HENT:  Negative for ear pain, postnasal drip, rhinorrhea and sinus pressure/congestion.    Respiratory:  Negative for cough and shortness of breath.    Cardiovascular:  Negative for chest pain.   Gastrointestinal:  Negative for abdominal pain, diarrhea, nausea and vomiting.   Genitourinary:  Negative for dysuria.   Neurological:  Negative for headaches.      /80   Pulse 88   Temp 98.2 °F (36.8 °C) (Oral)   Resp 20   Ht 5' 7" (1.702 m)   Wt 105.2 kg (232 lb)   SpO2 99%   BMI 36.34 kg/m²      Physical Exam  HENT:      Head: Normocephalic and atraumatic.   Cardiovascular:      Rate and Rhythm: Normal rate and regular rhythm.   Pulmonary:      Effort: Pulmonary " effort is normal.      Breath sounds: Normal breath sounds.   Neurological:      Mental Status: She is alert and oriented to person, place, and time.   Psychiatric:         Mood and Affect: Mood normal.         Behavior: Behavior normal.        Assessment and Plan      ICD-10-CM ICD-9-CM   1. Essential hypertension  I10 401.9   2. Anxiety  F41.9 300.00        Problem List Items Addressed This Visit          Psychiatric    Anxiety (Chronic)       Cardiac/Vascular    Essential hypertension - Primary (Chronic)      Patient is well controlled. Continue current meds.     Patient Instructions   Take Medications as directed  Monitor blood pressure outside of clinic  Eat a heart healthy diet and get plenty of cardiovascular exercise.       Follow up in about 5 months (around 3/19/2023).

## 2022-10-25 ENCOUNTER — TELEPHONE (OUTPATIENT)
Dept: FAMILY MEDICINE | Facility: CLINIC | Age: 57
End: 2022-10-25
Payer: MEDICARE

## 2022-10-25 NOTE — TELEPHONE ENCOUNTER
Received a phone call on Dr. Nix's vm that Pt requesting new script for Metoprolol sent to [facility] mail order, after looking at chart & contacting pharmacy Pt has 2 Rf at [facility] that was sent 9/28/22 that can be refilled... Pt notified.

## 2022-11-08 ENCOUNTER — OFFICE VISIT (OUTPATIENT)
Dept: FAMILY MEDICINE | Facility: CLINIC | Age: 57
End: 2022-11-08
Payer: MEDICARE

## 2022-11-08 VITALS
HEART RATE: 85 BPM | RESPIRATION RATE: 18 BRPM | DIASTOLIC BLOOD PRESSURE: 98 MMHG | SYSTOLIC BLOOD PRESSURE: 145 MMHG | WEIGHT: 223 LBS | HEIGHT: 67 IN | BODY MASS INDEX: 35 KG/M2 | TEMPERATURE: 99 F | OXYGEN SATURATION: 100 %

## 2022-11-08 DIAGNOSIS — A08.4 VIRAL GASTROENTERITIS: Primary | ICD-10-CM

## 2022-11-08 DIAGNOSIS — Z20.822 ENCOUNTER FOR LABORATORY TESTING FOR COVID-19 VIRUS: ICD-10-CM

## 2022-11-08 DIAGNOSIS — R19.7 DIARRHEA, UNSPECIFIED TYPE: ICD-10-CM

## 2022-11-08 LAB
CTP QC/QA: YES
FLUAV AG NPH QL: NEGATIVE
FLUBV AG NPH QL: NEGATIVE
SARS-COV-2 AG RESP QL IA.RAPID: NEGATIVE

## 2022-11-08 PROCEDURE — 1159F PR MEDICATION LIST DOCUMENTED IN MEDICAL RECORD: ICD-10-PCS | Mod: ,,, | Performed by: NURSE PRACTITIONER

## 2022-11-08 PROCEDURE — 3080F PR MOST RECENT DIASTOLIC BLOOD PRESSURE >= 90 MM HG: ICD-10-PCS | Mod: ,,, | Performed by: NURSE PRACTITIONER

## 2022-11-08 PROCEDURE — 99213 PR OFFICE/OUTPT VISIT, EST, LEVL III, 20-29 MIN: ICD-10-PCS | Mod: ,,, | Performed by: NURSE PRACTITIONER

## 2022-11-08 PROCEDURE — 3008F PR BODY MASS INDEX (BMI) DOCUMENTED: ICD-10-PCS | Mod: ,,, | Performed by: NURSE PRACTITIONER

## 2022-11-08 PROCEDURE — 3077F SYST BP >= 140 MM HG: CPT | Mod: ,,, | Performed by: NURSE PRACTITIONER

## 2022-11-08 PROCEDURE — 87428 POCT SARS-COV2 (COVID) WITH FLU ANTIGEN: ICD-10-PCS | Mod: QW,,, | Performed by: NURSE PRACTITIONER

## 2022-11-08 PROCEDURE — 3077F PR MOST RECENT SYSTOLIC BLOOD PRESSURE >= 140 MM HG: ICD-10-PCS | Mod: ,,, | Performed by: NURSE PRACTITIONER

## 2022-11-08 PROCEDURE — 3080F DIAST BP >= 90 MM HG: CPT | Mod: ,,, | Performed by: NURSE PRACTITIONER

## 2022-11-08 PROCEDURE — 87428 SARSCOV & INF VIR A&B AG IA: CPT | Mod: QW,,, | Performed by: NURSE PRACTITIONER

## 2022-11-08 PROCEDURE — 1159F MED LIST DOCD IN RCRD: CPT | Mod: ,,, | Performed by: NURSE PRACTITIONER

## 2022-11-08 PROCEDURE — 4010F PR ACE/ARB THEARPY RXD/TAKEN: ICD-10-PCS | Mod: ,,, | Performed by: NURSE PRACTITIONER

## 2022-11-08 PROCEDURE — 4010F ACE/ARB THERAPY RXD/TAKEN: CPT | Mod: ,,, | Performed by: NURSE PRACTITIONER

## 2022-11-08 PROCEDURE — 1160F RVW MEDS BY RX/DR IN RCRD: CPT | Mod: ,,, | Performed by: NURSE PRACTITIONER

## 2022-11-08 PROCEDURE — 99213 OFFICE O/P EST LOW 20 MIN: CPT | Mod: ,,, | Performed by: NURSE PRACTITIONER

## 2022-11-08 PROCEDURE — 3008F BODY MASS INDEX DOCD: CPT | Mod: ,,, | Performed by: NURSE PRACTITIONER

## 2022-11-08 PROCEDURE — 1160F PR REVIEW ALL MEDS BY PRESCRIBER/CLIN PHARMACIST DOCUMENTED: ICD-10-PCS | Mod: ,,, | Performed by: NURSE PRACTITIONER

## 2022-11-08 NOTE — PROGRESS NOTES
Alexsandra Pulliam DNP, FEDE    [address] [place] [address]     PATIENT NAME: Nikkie Brown  : 1965  DATE: 22  MRN: [id number]      Billing Provider: Alexsandra Pulliam DNP, FNP  Level of Service:   Patient PCP Information       Provider PCP Type    Alejandra Vann MD General            Reason for Visit / Chief Complaint: Diarrhea (Has had symptoms for 3 days and she feels like it's getting worse, not better. She is drinking fluids and keeping it down but has loose stool.  Not keeping food down.), Emesis, and Chills       Update PCP  Update Chief Complaint         History of Present Illness / Problem Focused Workflow     Nikkie Brown presents to the clinic with Diarrhea (Has had symptoms for 3 days and she feels like it's getting worse, not better. She is drinking fluids and keeping it down but has loose stool.  Not keeping food down.), Emesis, and Chills     Diarrhea   Associated symptoms include vomiting. Pertinent negatives include no abdominal pain, arthralgias, chills, coughing, fever, headaches or myalgias.   Emesis   Associated symptoms include diarrhea. Pertinent negatives include no abdominal pain, arthralgias, chest pain, chills, coughing, fever, headaches or myalgias.     Review of Systems     Review of Systems   Constitutional:  Negative for activity change, appetite change, chills, fatigue and fever.   HENT:  Negative for nasal congestion, ear pain, hearing loss, postnasal drip and sore throat.    Respiratory:  Negative for cough, chest tightness, shortness of breath and wheezing.    Cardiovascular:  Negative for chest pain, palpitations, leg swelling and claudication.   Gastrointestinal:  Positive for diarrhea and vomiting. Negative for abdominal pain, change in bowel habit, constipation, nausea and change in bowel habit.   Genitourinary:  Negative for dysuria.   Musculoskeletal:  Negative for arthralgias, back pain, gait problem and myalgias.    Integumentary:  Negative for rash.   Neurological:  Negative for weakness and headaches.   Psychiatric/Behavioral:  Negative for suicidal ideas. The patient is not nervous/anxious.       Medical / Social / Family History     Past Medical History:   Diagnosis Date    Anxiety     Bipolar disorder     Hypertension     Hypothyroidism     Polyp of descending colon 05/21/2021    Rectal polyp 05/21/2021    Screening for malignant neoplasm of colon 05/21/2021       Past Surgical History:   Procedure Laterality Date     left wrist repair      ANKLE SURGERY Left 2001    broke ankle has a plate in this ankle    APPLICATION OF EXTERNAL FIXATION DEVICE TO UPPER EXTREMITY Left 03/03/2022    Procedure: APPLICATION, EXTERNAL FIXATION DEVICE, SMALL, HAND OR WRIST;  Surgeon: Vidal Hernandez MD;  Location: [facility];  Service: Orthopedics;  Laterality: Left;    FRACTURE SURGERY      TUBAL LIGATION  1997       Social History  Ms. Nikkie Brown  reports that she has been smoking cigarettes. She started smoking about 24 years ago. She has a 12.50 pack-year smoking history. She has never used smokeless tobacco. She reports that she does not currently use alcohol. She reports current drug use. Frequency: 3.00 times per week. Drug: Marijuana.    Family History  Ms. Nikkie Brown's family history includes Cancer in her father; Heart disease in her maternal grandmother; Hypertension in her father and sister; Mental retardation in her father and sister; No Known Problems in her brother; Stroke in her maternal grandmother.    Medications and Allergies     Medications  Outpatient Medications Marked as Taking for the 11/8/22 encounter (Office Visit) with Alexsandra Pulliam, AGNIESZKA, FNP   Medication Sig Dispense Refill    amitriptyline (ELAVIL) 75 MG tablet Take 1 tablet (75 mg total) by mouth nightly. 90 tablet 1    ARIPiprazole (ABILIFY) 15 MG Tab Take 1 tablet (15 mg total) by mouth once daily. 90 tablet 1    busPIRone (BUSPAR) 30 MG  "Tab Take 1 tablet (30 mg total) by mouth 2 (two) times daily. 180 tablet 1    gabapentin (NEURONTIN) 100 MG capsule Take 1 capsule (100 mg total) by mouth 3 (three) times daily. 270 capsule 1    hydrALAZINE (APRESOLINE) 25 MG tablet Take 1 tablet (25 mg total) by mouth 3 (three) times daily. (Patient taking differently: Take 25 mg by mouth 3 (three) times daily. PT STATES TAKES TWICE A DAY) 270 tablet 1    hydroCHLOROthiazide (HYDRODIURIL) 25 MG tablet Take 1 tablet (25 mg total) by mouth once daily. 90 tablet 1    levothyroxine (SYNTHROID) 88 MCG tablet Take 1 tablet (88 mcg total) by mouth before breakfast. 90 tablet 1    lisinopriL (PRINIVIL,ZESTRIL) 40 MG tablet Take 1 tablet (40 mg total) by mouth once daily. 90 tablet 1    metoprolol succinate (TOPROL-XL) 25 MG 24 hr tablet Take 1 tablet (25 mg total) by mouth once daily. 30 tablet 2    naproxen (NAPROSYN) 500 MG tablet Take 1 tablet (500 mg total) by mouth 2 (two) times daily. 180 tablet 1    traZODone (DESYREL) 300 MG tablet Take 1 tablet (300 mg total) by mouth nightly. 90 tablet 1       Allergies  Review of patient's allergies indicates:  No Known Allergies    Physical Examination     Vitals:    11/08/22 1335 11/08/22 1349   BP: (!) 152/92 (!) 145/98   BP Location: Right arm Left arm   Patient Position: Sitting Sitting   BP Method: Large (Manual) Large (Manual)   Pulse: 85    Resp: 18    Temp: 99.3 °F (37.4 °C)    TempSrc: Oral    SpO2: 100%    Weight: 101.2 kg (223 lb)    Height: 5' 7" (1.702 m)      Vitals:    11/08/22 1335 11/08/22 1349   BP: (!) 152/92 (!) 145/98   BP Location: Right arm Left arm   Patient Position: Sitting Sitting   BP Method: Large (Manual) Large (Manual)   Pulse: 85    Resp: 18    Temp: 99.3 °F (37.4 °C)    TempSrc: Oral    SpO2: 100%    Weight: 101.2 kg (223 lb)    Height: 5' 7" (1.702 m)       Physical Exam  Vitals and nursing note reviewed.   Constitutional:       General: She is not in acute distress.  HENT:      Nose: Nose " normal.      Mouth/Throat:      Mouth: Mucous membranes are moist.   Eyes:      Pupils: Pupils are equal, round, and reactive to light.   Cardiovascular:      Rate and Rhythm: Normal rate and regular rhythm.      Pulses: Normal pulses.      Heart sounds: Normal heart sounds. No murmur heard.  Pulmonary:      Effort: Pulmonary effort is normal. No respiratory distress.      Breath sounds: Normal breath sounds. No wheezing, rhonchi or rales.   Chest:      Chest wall: No tenderness.   Abdominal:      General: Bowel sounds are normal.      Palpations: Abdomen is soft.   Musculoskeletal:         General: Normal range of motion.      Cervical back: Normal range of motion and neck supple.      Right lower leg: No edema.      Left lower leg: No edema.   Skin:     General: Skin is warm and dry.   Neurological:      General: No focal deficit present.      Mental Status: She is alert and oriented to person, place, and time.        Assessment and Plan (including Health Maintenance)      Problem List  Smart Sets  Document Outside HM   :    Plan:         Health Maintenance Due   Topic Date Due    Hepatitis C Screening  Never done       Problem List Items Addressed This Visit    None  Visit Diagnoses       Viral gastroenteritis    -  Primary    Encounter for laboratory testing for COVID-19 virus        Diarrhea, unspecified type              Viral gastroenteritis    Encounter for laboratory testing for COVID-19 virus  -     POCT SARS-COV2 (COVID) with Flu Antigen    Diarrhea, unspecified type     Health Maintenance Topics with due status: Not Due       Topic Last Completion Date    Cervical Cancer Screening 12/18/2020    Colorectal Cancer Screening 05/21/2021    Lipid Panel 01/14/2022    Mammogram 05/10/2022         Future Appointments   Date Time Provider Department Center   11/10/2022  1:45 PM Alejandra Vann MD [facility]   2/10/2023 10:30 AM Alejandra Vann MD [facility]   5/16/2023  9:30 AM  [facility]        Follow up if symptoms worsen or fail to improve.     Signature:  Alexsandra Pulliam DNP, FNP  [address] [place] [address]  Phone #: [phone]  Fax #: [phone]    Date of encounter: 11/8/22    Patient Instructions   Recommend otc probiotics 3 times a day until diarrhea improves. Then daily for 2 weeks.

## 2022-11-09 DIAGNOSIS — Z71.89 COMPLEX CARE COORDINATION: ICD-10-CM

## 2022-11-10 ENCOUNTER — OFFICE VISIT (OUTPATIENT)
Dept: FAMILY MEDICINE | Facility: CLINIC | Age: 57
End: 2022-11-10
Payer: MEDICARE

## 2022-11-10 VITALS
RESPIRATION RATE: 18 BRPM | SYSTOLIC BLOOD PRESSURE: 102 MMHG | OXYGEN SATURATION: 100 % | DIASTOLIC BLOOD PRESSURE: 68 MMHG | WEIGHT: 228 LBS | HEIGHT: 67 IN | HEART RATE: 90 BPM | BODY MASS INDEX: 35.79 KG/M2 | TEMPERATURE: 98 F

## 2022-11-10 DIAGNOSIS — I10 ESSENTIAL HYPERTENSION: Primary | Chronic | ICD-10-CM

## 2022-11-10 PROCEDURE — 1160F PR REVIEW ALL MEDS BY PRESCRIBER/CLIN PHARMACIST DOCUMENTED: ICD-10-PCS | Mod: ,,, | Performed by: FAMILY MEDICINE

## 2022-11-10 PROCEDURE — 1159F PR MEDICATION LIST DOCUMENTED IN MEDICAL RECORD: ICD-10-PCS | Mod: ,,, | Performed by: FAMILY MEDICINE

## 2022-11-10 PROCEDURE — 1159F MED LIST DOCD IN RCRD: CPT | Mod: ,,, | Performed by: FAMILY MEDICINE

## 2022-11-10 PROCEDURE — 3074F PR MOST RECENT SYSTOLIC BLOOD PRESSURE < 130 MM HG: ICD-10-PCS | Mod: ,,, | Performed by: FAMILY MEDICINE

## 2022-11-10 PROCEDURE — 99213 PR OFFICE/OUTPT VISIT, EST, LEVL III, 20-29 MIN: ICD-10-PCS | Mod: ,,, | Performed by: FAMILY MEDICINE

## 2022-11-10 PROCEDURE — 3078F DIAST BP <80 MM HG: CPT | Mod: ,,, | Performed by: FAMILY MEDICINE

## 2022-11-10 PROCEDURE — 1160F RVW MEDS BY RX/DR IN RCRD: CPT | Mod: ,,, | Performed by: FAMILY MEDICINE

## 2022-11-10 PROCEDURE — 4010F ACE/ARB THERAPY RXD/TAKEN: CPT | Mod: ,,, | Performed by: FAMILY MEDICINE

## 2022-11-10 PROCEDURE — 99213 OFFICE O/P EST LOW 20 MIN: CPT | Mod: ,,, | Performed by: FAMILY MEDICINE

## 2022-11-10 PROCEDURE — 3008F PR BODY MASS INDEX (BMI) DOCUMENTED: ICD-10-PCS | Mod: ,,, | Performed by: FAMILY MEDICINE

## 2022-11-10 PROCEDURE — 3074F SYST BP LT 130 MM HG: CPT | Mod: ,,, | Performed by: FAMILY MEDICINE

## 2022-11-10 PROCEDURE — 4010F PR ACE/ARB THEARPY RXD/TAKEN: ICD-10-PCS | Mod: ,,, | Performed by: FAMILY MEDICINE

## 2022-11-10 PROCEDURE — 3078F PR MOST RECENT DIASTOLIC BLOOD PRESSURE < 80 MM HG: ICD-10-PCS | Mod: ,,, | Performed by: FAMILY MEDICINE

## 2022-11-10 PROCEDURE — 3008F BODY MASS INDEX DOCD: CPT | Mod: ,,, | Performed by: FAMILY MEDICINE

## 2022-11-10 NOTE — PROGRESS NOTES
New Clinic Note    Nikkie Brown is a 57 y.o. female     CC:   Chief Complaint   Patient presents with    Hypertension     Stated she is here for elevated blood pressure checks at home. Did not bring her blood pressure log in for review. Seen in clinic 2 days ago with viral gastroenteritis. Stated she feels much better. Stated she lives alone and her boyfriend and brother just come and go.Stated when her boyfriend and brother are both around it makes her angry and anxious and makes her blood pressure go up.  Stated she still goes to [facility] for her Schizophrenia and anxiety.     Anxiety        Subjective  Patient is here to follow up on her blood pressure. She was seen 2 days ago with gastroenteritis. Her blood pressure was elevated at that visit. She reports that those symptoms have resolved and her blood pressure has come down.     Presents to clinic for follow up on chronic health problems    Hypertension:    Takes medications as directed: yes  Checks blood pressure outside of clinic: yes  On a statin: no  Cardiovascular exercise: no  Heart healthy diet: no        Current Outpatient Medications:     amitriptyline (ELAVIL) 75 MG tablet, Take 1 tablet (75 mg total) by mouth nightly., Disp: 90 tablet, Rfl: 1    ARIPiprazole (ABILIFY) 15 MG Tab, Take 1 tablet (15 mg total) by mouth once daily., Disp: 90 tablet, Rfl: 1    busPIRone (BUSPAR) 30 MG Tab, Take 1 tablet (30 mg total) by mouth 2 (two) times daily., Disp: 180 tablet, Rfl: 1    gabapentin (NEURONTIN) 100 MG capsule, Take 1 capsule (100 mg total) by mouth 3 (three) times daily., Disp: 270 capsule, Rfl: 1    hydrALAZINE (APRESOLINE) 25 MG tablet, Take 1 tablet (25 mg total) by mouth 3 (three) times daily. (Patient taking differently: Take 25 mg by mouth 3 (three) times daily. PT STATES TAKES TWICE A DAY), Disp: 270 tablet, Rfl: 1    hydroCHLOROthiazide (HYDRODIURIL) 25 MG tablet, Take 1 tablet (25 mg total) by mouth once daily., Disp: 90 tablet, Rfl: 1     levothyroxine (SYNTHROID) 88 MCG tablet, Take 1 tablet (88 mcg total) by mouth before breakfast., Disp: 90 tablet, Rfl: 1    lisinopriL (PRINIVIL,ZESTRIL) 40 MG tablet, Take 1 tablet (40 mg total) by mouth once daily., Disp: 90 tablet, Rfl: 1    metoprolol succinate (TOPROL-XL) 25 MG 24 hr tablet, Take 1 tablet (25 mg total) by mouth once daily., Disp: 30 tablet, Rfl: 2    naproxen (NAPROSYN) 500 MG tablet, Take 1 tablet (500 mg total) by mouth 2 (two) times daily., Disp: 180 tablet, Rfl: 1    traZODone (DESYREL) 300 MG tablet, Take 1 tablet (300 mg total) by mouth nightly., Disp: 90 tablet, Rfl: 1    ferrous sulfate 325 (65 FE) MG EC tablet, Take 1 tablet (325 mg total) by mouth 3 (three) times daily with meals. (Patient not taking: Reported on 11/8/2022), Disp: 30 tablet, Rfl: 2     Past Medical History:   Diagnosis Date    Anxiety     Bipolar disorder     Hypertension     Hypothyroidism     Polyp of descending colon 05/21/2021    Rectal polyp 05/21/2021    Screening for malignant neoplasm of colon 05/21/2021        Family History   Problem Relation Age of Onset    Hypertension Father     Mental retardation Father     Cancer Father     Hypertension Sister     Mental retardation Sister     Stroke Maternal Grandmother     Heart disease Maternal Grandmother     No Known Problems Brother         Past Surgical History:   Procedure Laterality Date     left wrist repair      ANKLE SURGERY Left 2001    broke ankle has a plate in this ankle    APPLICATION OF EXTERNAL FIXATION DEVICE TO UPPER EXTREMITY Left 03/03/2022    Procedure: APPLICATION, EXTERNAL FIXATION DEVICE, SMALL, HAND OR WRIST;  Surgeon: Vidal Hernandez MD;  Location: [facility];  Service: Orthopedics;  Laterality: Left;    FRACTURE SURGERY      TUBAL LIGATION  1997        Review of Systems   Constitutional:  Negative for fatigue and fever.   HENT:  Negative for ear pain, postnasal drip, rhinorrhea and sinus pressure/congestion.    Respiratory:   "Negative for cough and shortness of breath.    Cardiovascular:  Negative for chest pain.   Gastrointestinal:  Negative for abdominal pain, diarrhea, nausea and vomiting.   Genitourinary:  Negative for dysuria.   Neurological:  Negative for headaches.      /68 (BP Location: Right arm, Patient Position: Sitting, BP Method: Large (Automatic))   Pulse 90   Temp 98 °F (36.7 °C) (Oral)   Resp 18   Ht 5' 7" (1.702 m)   Wt 103.4 kg (228 lb)   SpO2 100%   BMI 35.71 kg/m²      Physical Exam  HENT:      Head: Normocephalic and atraumatic.   Cardiovascular:      Rate and Rhythm: Normal rate and regular rhythm.   Pulmonary:      Effort: Pulmonary effort is normal.      Breath sounds: Normal breath sounds.   Neurological:      Mental Status: She is alert and oriented to person, place, and time.   Psychiatric:         Mood and Affect: Mood normal.         Behavior: Behavior normal.        Assessment and Plan      ICD-10-CM ICD-9-CM   1. Essential hypertension  I10 401.9        Problem List Items Addressed This Visit          Cardiac/Vascular    Essential hypertension - Primary (Chronic)      Continue current meds.     Patient Instructions   Take Medications as directed  Monitor blood pressure outside of clinic  Eat a heart healthy diet and get plenty of cardiovascular exercise.       Follow up if symptoms worsen or fail to improve.          "

## 2023-01-25 ENCOUNTER — OFFICE VISIT (OUTPATIENT)
Dept: FAMILY MEDICINE | Facility: CLINIC | Age: 58
End: 2023-01-25
Payer: MEDICARE

## 2023-01-25 VITALS
OXYGEN SATURATION: 98 % | WEIGHT: 229 LBS | RESPIRATION RATE: 18 BRPM | DIASTOLIC BLOOD PRESSURE: 85 MMHG | HEIGHT: 67 IN | TEMPERATURE: 100 F | SYSTOLIC BLOOD PRESSURE: 135 MMHG | BODY MASS INDEX: 35.94 KG/M2 | HEART RATE: 85 BPM

## 2023-01-25 DIAGNOSIS — I10 ESSENTIAL HYPERTENSION: Chronic | ICD-10-CM

## 2023-01-25 PROCEDURE — 3079F DIAST BP 80-89 MM HG: CPT | Mod: ,,, | Performed by: FAMILY MEDICINE

## 2023-01-25 PROCEDURE — 3075F PR MOST RECENT SYSTOLIC BLOOD PRESS GE 130-139MM HG: ICD-10-PCS | Mod: ,,, | Performed by: FAMILY MEDICINE

## 2023-01-25 PROCEDURE — 1160F PR REVIEW ALL MEDS BY PRESCRIBER/CLIN PHARMACIST DOCUMENTED: ICD-10-PCS | Mod: ,,, | Performed by: FAMILY MEDICINE

## 2023-01-25 PROCEDURE — 3008F PR BODY MASS INDEX (BMI) DOCUMENTED: ICD-10-PCS | Mod: ,,, | Performed by: FAMILY MEDICINE

## 2023-01-25 PROCEDURE — 1159F PR MEDICATION LIST DOCUMENTED IN MEDICAL RECORD: ICD-10-PCS | Mod: ,,, | Performed by: FAMILY MEDICINE

## 2023-01-25 PROCEDURE — 1160F RVW MEDS BY RX/DR IN RCRD: CPT | Mod: ,,, | Performed by: FAMILY MEDICINE

## 2023-01-25 PROCEDURE — 99213 OFFICE O/P EST LOW 20 MIN: CPT | Mod: ,,, | Performed by: FAMILY MEDICINE

## 2023-01-25 PROCEDURE — 3075F SYST BP GE 130 - 139MM HG: CPT | Mod: ,,, | Performed by: FAMILY MEDICINE

## 2023-01-25 PROCEDURE — 1159F MED LIST DOCD IN RCRD: CPT | Mod: ,,, | Performed by: FAMILY MEDICINE

## 2023-01-25 PROCEDURE — 99213 PR OFFICE/OUTPT VISIT, EST, LEVL III, 20-29 MIN: ICD-10-PCS | Mod: ,,, | Performed by: FAMILY MEDICINE

## 2023-01-25 PROCEDURE — 3008F BODY MASS INDEX DOCD: CPT | Mod: ,,, | Performed by: FAMILY MEDICINE

## 2023-01-25 PROCEDURE — 3079F PR MOST RECENT DIASTOLIC BLOOD PRESSURE 80-89 MM HG: ICD-10-PCS | Mod: ,,, | Performed by: FAMILY MEDICINE
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
## 2023-02-03 DIAGNOSIS — I10 ESSENTIAL HYPERTENSION: Chronic | ICD-10-CM

## 2023-02-03 RX ORDER — METOPROLOL SUCCINATE 25 MG/1
25 TABLET, EXTENDED RELEASE ORAL DAILY
Qty: 90 TABLET | Refills: 1 | Status: SHIPPED | OUTPATIENT
Start: 2023-02-03 | End: 2023-04-26 | Stop reason: SDUPTHER

## 2023-02-20 ENCOUNTER — OFFICE VISIT (OUTPATIENT)
Dept: FAMILY MEDICINE | Facility: CLINIC | Age: 58
End: 2023-02-20
Payer: MEDICARE

## 2023-02-20 VITALS
WEIGHT: 229 LBS | SYSTOLIC BLOOD PRESSURE: 130 MMHG | RESPIRATION RATE: 18 BRPM | TEMPERATURE: 98 F | HEIGHT: 67 IN | DIASTOLIC BLOOD PRESSURE: 86 MMHG | OXYGEN SATURATION: 99 % | HEART RATE: 80 BPM | BODY MASS INDEX: 35.94 KG/M2

## 2023-02-20 DIAGNOSIS — E03.9 HYPOTHYROIDISM, UNSPECIFIED TYPE: ICD-10-CM

## 2023-02-20 DIAGNOSIS — E66.01 SEVERE OBESITY (BMI 35.0-39.9) WITH COMORBIDITY: ICD-10-CM

## 2023-02-20 DIAGNOSIS — N18.32 STAGE 3B CHRONIC KIDNEY DISEASE: ICD-10-CM

## 2023-02-20 DIAGNOSIS — I10 ESSENTIAL HYPERTENSION: Primary | ICD-10-CM

## 2023-02-20 DIAGNOSIS — G47.00 INSOMNIA, UNSPECIFIED TYPE: Chronic | ICD-10-CM

## 2023-02-20 DIAGNOSIS — F41.9 ANXIETY: Chronic | ICD-10-CM

## 2023-02-20 DIAGNOSIS — F17.210 CIGARETTE NICOTINE DEPENDENCE WITHOUT COMPLICATION: ICD-10-CM

## 2023-02-20 DIAGNOSIS — F20.9 SCHIZOPHRENIA, UNSPECIFIED TYPE: ICD-10-CM

## 2023-02-20 DIAGNOSIS — M15.9 OSTEOARTHRITIS OF MULTIPLE JOINTS, UNSPECIFIED OSTEOARTHRITIS TYPE: Chronic | ICD-10-CM

## 2023-02-20 LAB
ALBUMIN SERPL BCP-MCNC: 3.1 G/DL (ref 3.5–5)
ALBUMIN/GLOB SERPL: 0.8 {RATIO}
ALP SERPL-CCNC: 74 U/L (ref 46–118)
ALT SERPL W P-5'-P-CCNC: 21 U/L (ref 13–56)
ANION GAP SERPL CALCULATED.3IONS-SCNC: 7 MMOL/L (ref 7–16)
ANISOCYTOSIS BLD QL SMEAR: ABNORMAL
AST SERPL W P-5'-P-CCNC: 17 U/L (ref 15–37)
BASOPHILS # BLD AUTO: 0.09 K/UL (ref 0–0.2)
BASOPHILS NFR BLD AUTO: 0.9 % (ref 0–1)
BILIRUB SERPL-MCNC: 0.3 MG/DL (ref ?–1.2)
BUN SERPL-MCNC: 8 MG/DL (ref 7–18)
BUN/CREAT SERPL: 5 (ref 6–20)
CALCIUM SERPL-MCNC: 9 MG/DL (ref 8.5–10.1)
CHLORIDE SERPL-SCNC: 107 MMOL/L (ref 98–107)
CHOLEST SERPL-MCNC: 162 MG/DL (ref 0–200)
CHOLEST/HDLC SERPL: 4.8 {RATIO}
CO2 SERPL-SCNC: 31 MMOL/L (ref 21–32)
CREAT SERPL-MCNC: 1.61 MG/DL (ref 0.55–1.02)
DIFFERENTIAL METHOD BLD: ABNORMAL
EGFR (NO RACE VARIABLE) (RUSH/TITUS): 37 ML/MIN/1.73M²
EOSINOPHIL # BLD AUTO: 0.14 K/UL (ref 0–0.5)
EOSINOPHIL NFR BLD AUTO: 1.4 % (ref 1–4)
EOSINOPHIL NFR BLD MANUAL: 1 % (ref 1–4)
ERYTHROCYTE [DISTWIDTH] IN BLOOD BY AUTOMATED COUNT: 15.6 % (ref 11.5–14.5)
GLOBULIN SER-MCNC: 3.8 G/DL (ref 2–4)
GLUCOSE SERPL-MCNC: 83 MG/DL (ref 74–106)
HCT VFR BLD AUTO: 41.5 % (ref 38–47)
HDLC SERPL-MCNC: 34 MG/DL (ref 40–60)
HGB BLD-MCNC: 13.3 G/DL (ref 12–16)
IMM GRANULOCYTES # BLD AUTO: 0.04 K/UL (ref 0–0.04)
IMM GRANULOCYTES NFR BLD: 0.4 % (ref 0–0.4)
LDLC SERPL CALC-MCNC: 74 MG/DL
LDLC/HDLC SERPL: 2.2 {RATIO}
LYMPHOCYTES # BLD AUTO: 5.05 K/UL (ref 1–4.8)
LYMPHOCYTES NFR BLD AUTO: 48.9 % (ref 27–41)
LYMPHOCYTES NFR BLD MANUAL: 60 % (ref 27–41)
MCH RBC QN AUTO: 28.7 PG (ref 27–31)
MCHC RBC AUTO-ENTMCNC: 32 G/DL (ref 32–36)
MCV RBC AUTO: 89.4 FL (ref 80–96)
MONOCYTES # BLD AUTO: 0.53 K/UL (ref 0–0.8)
MONOCYTES NFR BLD AUTO: 5.1 % (ref 2–6)
MONOCYTES NFR BLD MANUAL: 6 % (ref 2–6)
MPC BLD CALC-MCNC: 10.6 FL (ref 9.4–12.4)
NEUTROPHILS # BLD AUTO: 4.48 K/UL (ref 1.8–7.7)
NEUTROPHILS NFR BLD AUTO: 43.3 % (ref 53–65)
NEUTS SEG NFR BLD MANUAL: 33 % (ref 50–62)
NONHDLC SERPL-MCNC: 128 MG/DL
NRBC # BLD AUTO: 0 X10E3/UL
NRBC, AUTO (.00): 0 %
PLATELET # BLD AUTO: 363 K/UL (ref 150–400)
PLATELET MORPHOLOGY: NORMAL
POTASSIUM SERPL-SCNC: 4.7 MMOL/L (ref 3.5–5.1)
PROT SERPL-MCNC: 6.9 G/DL (ref 6.4–8.2)
RBC # BLD AUTO: 4.64 M/UL (ref 4.2–5.4)
SODIUM SERPL-SCNC: 140 MMOL/L (ref 136–145)
TRIGL SERPL-MCNC: 271 MG/DL (ref 35–150)
TSH SERPL DL<=0.005 MIU/L-ACNC: 9.2 UIU/ML (ref 0.36–3.74)
VLDLC SERPL-MCNC: 54 MG/DL
WBC # BLD AUTO: 10.33 K/UL (ref 4.5–11)

## 2023-02-20 PROCEDURE — 1160F PR REVIEW ALL MEDS BY PRESCRIBER/CLIN PHARMACIST DOCUMENTED: ICD-10-PCS | Mod: ,,, | Performed by: FAMILY MEDICINE

## 2023-02-20 PROCEDURE — 80050 PR  GENERAL HEALTH PANEL: ICD-10-PCS | Mod: ,,, | Performed by: CLINICAL MEDICAL LABORATORY

## 2023-02-20 PROCEDURE — 1159F PR MEDICATION LIST DOCUMENTED IN MEDICAL RECORD: ICD-10-PCS | Mod: ,,, | Performed by: FAMILY MEDICINE

## 2023-02-20 PROCEDURE — 80061 LIPID PANEL: CPT | Mod: ,,, | Performed by: CLINICAL MEDICAL LABORATORY

## 2023-02-20 PROCEDURE — 1159F MED LIST DOCD IN RCRD: CPT | Mod: ,,, | Performed by: FAMILY MEDICINE

## 2023-02-20 PROCEDURE — 80050 GENERAL HEALTH PANEL: CPT | Mod: ,,, | Performed by: CLINICAL MEDICAL LABORATORY

## 2023-02-20 PROCEDURE — 80061 LIPID PANEL: ICD-10-PCS | Mod: ,,, | Performed by: CLINICAL MEDICAL LABORATORY

## 2023-02-20 PROCEDURE — 99214 PR OFFICE/OUTPT VISIT, EST, LEVL IV, 30-39 MIN: ICD-10-PCS | Mod: ,,, | Performed by: FAMILY MEDICINE

## 2023-02-20 PROCEDURE — 3075F SYST BP GE 130 - 139MM HG: CPT | Mod: ,,, | Performed by: FAMILY MEDICINE

## 2023-02-20 PROCEDURE — 3075F PR MOST RECENT SYSTOLIC BLOOD PRESS GE 130-139MM HG: ICD-10-PCS | Mod: ,,, | Performed by: FAMILY MEDICINE

## 2023-02-20 PROCEDURE — 3008F BODY MASS INDEX DOCD: CPT | Mod: ,,, | Performed by: FAMILY MEDICINE

## 2023-02-20 PROCEDURE — 1160F RVW MEDS BY RX/DR IN RCRD: CPT | Mod: ,,, | Performed by: FAMILY MEDICINE

## 2023-02-20 PROCEDURE — 3008F PR BODY MASS INDEX (BMI) DOCUMENTED: ICD-10-PCS | Mod: ,,, | Performed by: FAMILY MEDICINE

## 2023-02-20 PROCEDURE — 3079F PR MOST RECENT DIASTOLIC BLOOD PRESSURE 80-89 MM HG: ICD-10-PCS | Mod: ,,, | Performed by: FAMILY MEDICINE

## 2023-02-20 PROCEDURE — 3079F DIAST BP 80-89 MM HG: CPT | Mod: ,,, | Performed by: FAMILY MEDICINE

## 2023-02-20 PROCEDURE — 99214 OFFICE O/P EST MOD 30 MIN: CPT | Mod: ,,, | Performed by: FAMILY MEDICINE

## 2023-02-20 RX ORDER — ARIPIPRAZOLE 15 MG/1
15 TABLET ORAL DAILY
Qty: 90 TABLET | Refills: 1 | Status: SHIPPED | OUTPATIENT
Start: 2023-02-20 | End: 2023-08-21 | Stop reason: SDUPTHER

## 2023-02-20 RX ORDER — NAPROXEN 500 MG/1
500 TABLET ORAL 2 TIMES DAILY
Qty: 180 TABLET | Refills: 1 | Status: SHIPPED | OUTPATIENT
Start: 2023-02-20 | End: 2023-04-26

## 2023-02-20 RX ORDER — HYDRALAZINE HYDROCHLORIDE 25 MG/1
25 TABLET, FILM COATED ORAL EVERY 8 HOURS
Qty: 270 TABLET | Refills: 1 | Status: SHIPPED | OUTPATIENT
Start: 2023-02-20 | End: 2023-08-21 | Stop reason: SDUPTHER

## 2023-02-20 RX ORDER — AMITRIPTYLINE HYDROCHLORIDE 75 MG/1
75 TABLET ORAL NIGHTLY
Qty: 90 TABLET | Refills: 1 | Status: SHIPPED | OUTPATIENT
Start: 2023-02-20 | End: 2023-08-21 | Stop reason: SDUPTHER

## 2023-02-20 RX ORDER — LEVOTHYROXINE SODIUM 88 UG/1
88 TABLET ORAL DAILY
Qty: 90 TABLET | Refills: 1 | Status: SHIPPED | OUTPATIENT
Start: 2023-02-20 | End: 2023-03-02

## 2023-02-20 RX ORDER — BUSPIRONE HYDROCHLORIDE 30 MG/1
30 TABLET ORAL 2 TIMES DAILY
Qty: 180 TABLET | Refills: 1 | Status: SHIPPED | OUTPATIENT
Start: 2023-02-20 | End: 2023-08-21 | Stop reason: SDUPTHER

## 2023-02-20 NOTE — PROGRESS NOTES
New Clinic Note    Nikkie Brown is a 58 y.o. female     CC:   Chief Complaint   Patient presents with    Follow-up     State she's in for 6 month follow up. Stated she had been taking blood pressure at home but did not bring in her log.    Medication Refill        Subjective    History of Present Illness HPI   Patient is here for evaluation of her chronic medical problems. She needs refills. She is tolerating well. She reports that her blood pressure is controlled at home.     Current Outpatient Medications:     hydroCHLOROthiazide (HYDRODIURIL) 25 MG tablet, Take 1 tablet (25 mg total) by mouth once daily., Disp: 90 tablet, Rfl: 1    lisinopriL (PRINIVIL,ZESTRIL) 40 MG tablet, Take 1 tablet (40 mg total) by mouth once daily., Disp: 90 tablet, Rfl: 1    metoprolol succinate (TOPROL-XL) 25 MG 24 hr tablet, Take 1 tablet (25 mg total) by mouth once daily., Disp: 90 tablet, Rfl: 1    traZODone (DESYREL) 300 MG tablet, Take 1 tablet (300 mg total) by mouth nightly., Disp: 90 tablet, Rfl: 1    amitriptyline (ELAVIL) 75 MG tablet, Take 1 tablet (75 mg total) by mouth nightly., Disp: 90 tablet, Rfl: 1    ARIPiprazole (ABILIFY) 15 MG Tab, Take 1 tablet (15 mg total) by mouth once daily., Disp: 90 tablet, Rfl: 1    busPIRone (BUSPAR) 30 MG Tab, Take 1 tablet (30 mg total) by mouth 2 (two) times daily., Disp: 180 tablet, Rfl: 1    hydrALAZINE (APRESOLINE) 25 MG tablet, Take 1 tablet (25 mg total) by mouth every 8 (eight) hours., Disp: 270 tablet, Rfl: 1    levothyroxine (SYNTHROID) 88 MCG tablet, Take 1 tablet (88 mcg total) by mouth once daily., Disp: 90 tablet, Rfl: 1    naproxen (NAPROSYN) 500 MG tablet, Take 1 tablet (500 mg total) by mouth 2 (two) times daily., Disp: 180 tablet, Rfl: 1     Past Medical History:   Diagnosis Date    Anxiety     Bipolar disorder     Hypertension     Hypothyroidism     Polyp of descending colon 05/21/2021    Rectal polyp 05/21/2021    Screening for malignant neoplasm of colon 05/21/2021  "       Family History   Problem Relation Age of Onset    Hypertension Father     Mental retardation Father     Cancer Father     Hypertension Sister     Mental retardation Sister     Stroke Maternal Grandmother     Heart disease Maternal Grandmother     No Known Problems Brother         Past Surgical History:   Procedure Laterality Date     left wrist repair      ANKLE SURGERY Left 2001    broke ankle has a plate in this ankle    APPLICATION OF EXTERNAL FIXATION DEVICE TO UPPER EXTREMITY Left 03/03/2022    Procedure: APPLICATION, EXTERNAL FIXATION DEVICE, SMALL, HAND OR WRIST;  Surgeon: Vidal Hernandez MD;  Location: [facility];  Service: Orthopedics;  Laterality: Left;    FRACTURE SURGERY      TUBAL LIGATION  1997        Review of Systems   Constitutional:  Negative for fatigue and fever.   HENT:  Negative for ear pain, postnasal drip, rhinorrhea and sinus pressure/congestion.    Respiratory:  Negative for cough and shortness of breath.    Cardiovascular:  Negative for chest pain.   Gastrointestinal:  Negative for abdominal pain, diarrhea, nausea and vomiting.   Genitourinary:  Negative for dysuria.   Neurological:  Negative for headaches.      /86 (BP Location: Right arm, Patient Position: Sitting, BP Method: Large (Manual))   Pulse 80   Temp 97.7 °F (36.5 °C) (Oral)   Resp 18   Ht 5' 7" (1.702 m)   Wt 103.9 kg (229 lb)   SpO2 99%   BMI 35.87 kg/m²      Physical Exam  HENT:      Head: Normocephalic and atraumatic.      Mouth/Throat:      Pharynx: Oropharynx is clear.   Cardiovascular:      Rate and Rhythm: Normal rate and regular rhythm.   Pulmonary:      Effort: Pulmonary effort is normal.      Breath sounds: Normal breath sounds.   Neurological:      Mental Status: She is alert and oriented to person, place, and time.   Psychiatric:         Mood and Affect: Mood normal.         Behavior: Behavior normal.        Assessment and Plan      ICD-10-CM ICD-9-CM   1. Essential hypertension  I10 " 401.9   2. Hypothyroidism, unspecified type  E03.9 244.9   3. Severe obesity (BMI 35.0-39.9) with comorbidity  E66.01 278.01   4. Schizophrenia, unspecified type  F20.9 295.90   5. Stage 3b chronic kidney disease  N18.32 585.3   6. Osteoarthritis of multiple joints, unspecified osteoarthritis type  M15.9 715.89   7. Anxiety  F41.9 300.00   8. Insomnia, unspecified type  G47.00 780.52   9. Cigarette nicotine dependence without complication  F17.210 305.1        Problem List Items Addressed This Visit          Psychiatric    Anxiety (Chronic)    Current Assessment & Plan     The current medical regimen is effective;  continue present plan and medications.           Relevant Medications    busPIRone (BUSPAR) 30 MG Tab    Schizophrenia, unspecified type (Chronic)    Current Assessment & Plan     The current medical regimen is effective;  continue present plan and medications.         Relevant Medications    ARIPiprazole (ABILIFY) 15 MG Tab       Cardiac/Vascular    Essential hypertension - Primary (Chronic)    Current Assessment & Plan     The current medical regimen is effective;  continue present plan and medications.           Relevant Medications    hydrALAZINE (APRESOLINE) 25 MG tablet    Other Relevant Orders    CBC Auto Differential    Comprehensive Metabolic Panel    Lipid Panel       Renal/    Stage 3b chronic kidney disease (Chronic)    Current Assessment & Plan     Will recheck labs today to see if this is stable.             Endocrine    Hypothyroidism (Chronic)    Current Assessment & Plan     The current medical regimen is effective;  continue present plan and medications.         Relevant Medications    levothyroxine (SYNTHROID) 88 MCG tablet    Other Relevant Orders    TSH    Severe obesity (BMI 35.0-39.9) with comorbidity (Chronic)    Current Assessment & Plan     Diet handouts given            Orthopedic    Osteoarthritis of multiple joints (Chronic)    Current Assessment & Plan     The current  medical regimen is effective;  continue present plan and medications.         Relevant Medications    naproxen (NAPROSYN) 500 MG tablet       Other    Insomnia (Chronic)    Current Assessment & Plan     The current medical regimen is effective;  continue present plan and medications.         Relevant Medications    amitriptyline (ELAVIL) 75 MG tablet    Cigarette nicotine dependence without complication (Chronic)    Current Assessment & Plan     Patient is not interested in quitting. Educational information given.                Follow up in about 6 months (around 8/20/2023).

## 2023-03-02 DIAGNOSIS — E03.9 ACQUIRED HYPOTHYROIDISM: Primary | ICD-10-CM

## 2023-03-02 RX ORDER — LEVOTHYROXINE SODIUM 100 UG/1
100 TABLET ORAL
Qty: 90 TABLET | Refills: 1 | Status: SHIPPED | OUTPATIENT
Start: 2023-03-02 | End: 2023-08-21 | Stop reason: SDUPTHER

## 2023-03-13 ENCOUNTER — TELEPHONE (OUTPATIENT)
Dept: FAMILY MEDICINE | Facility: CLINIC | Age: 58
End: 2023-03-13
Payer: MEDICARE

## 2023-03-13 NOTE — TELEPHONE ENCOUNTER
Patient called and said she has been having some real bad muscle crams. She said they are in legs, arms and stomach. She said basically cramps and spasms all over her body. She wants to see if there is anything she can do for this. She said she recently had her thyroid level checked and it was abnormal and med dosage was changed.

## 2023-03-27 ENCOUNTER — OFFICE VISIT (OUTPATIENT)
Dept: FAMILY MEDICINE | Facility: CLINIC | Age: 58
End: 2023-03-27
Payer: MEDICARE

## 2023-03-27 VITALS
HEART RATE: 102 BPM | OXYGEN SATURATION: 98 % | WEIGHT: 232.38 LBS | RESPIRATION RATE: 16 BRPM | TEMPERATURE: 99 F | SYSTOLIC BLOOD PRESSURE: 132 MMHG | HEIGHT: 67 IN | DIASTOLIC BLOOD PRESSURE: 92 MMHG | BODY MASS INDEX: 36.47 KG/M2

## 2023-03-27 DIAGNOSIS — N18.32 STAGE 3B CHRONIC KIDNEY DISEASE: ICD-10-CM

## 2023-03-27 DIAGNOSIS — E03.9 HYPOTHYROIDISM, UNSPECIFIED TYPE: Primary | ICD-10-CM

## 2023-03-27 DIAGNOSIS — I10 ESSENTIAL HYPERTENSION: ICD-10-CM

## 2023-03-27 LAB
ANION GAP SERPL CALCULATED.3IONS-SCNC: 9 MMOL/L (ref 7–16)
ATYPICAL LYMPHOCYTES: ABNORMAL
BASOPHILS # BLD AUTO: 0.06 K/UL (ref 0–0.2)
BASOPHILS NFR BLD AUTO: 0.5 % (ref 0–1)
BASOPHILS NFR BLD MANUAL: 1 % (ref 0–1)
BUN SERPL-MCNC: 16 MG/DL (ref 7–18)
BUN/CREAT SERPL: 9 (ref 6–20)
CALCIUM SERPL-MCNC: 10 MG/DL (ref 8.5–10.1)
CHLORIDE SERPL-SCNC: 102 MMOL/L (ref 98–107)
CO2 SERPL-SCNC: 29 MMOL/L (ref 21–32)
CREAT SERPL-MCNC: 1.79 MG/DL (ref 0.55–1.02)
DIFFERENTIAL METHOD BLD: ABNORMAL
EGFR (NO RACE VARIABLE) (RUSH/TITUS): 33 ML/MIN/1.73M²
EOSINOPHIL # BLD AUTO: 0.04 K/UL (ref 0–0.5)
EOSINOPHIL NFR BLD AUTO: 0.3 % (ref 1–4)
ERYTHROCYTE [DISTWIDTH] IN BLOOD BY AUTOMATED COUNT: 15.2 % (ref 11.5–14.5)
GLUCOSE SERPL-MCNC: 102 MG/DL (ref 74–106)
HCT VFR BLD AUTO: 40.7 % (ref 38–47)
HGB BLD-MCNC: 13.3 G/DL (ref 12–16)
IMM GRANULOCYTES # BLD AUTO: 0.03 K/UL (ref 0–0.04)
IMM GRANULOCYTES NFR BLD: 0.3 % (ref 0–0.4)
LYMPHOCYTES # BLD AUTO: 5.63 K/UL (ref 1–4.8)
LYMPHOCYTES NFR BLD AUTO: 48 % (ref 27–41)
LYMPHOCYTES NFR BLD MANUAL: 51 % (ref 27–41)
MCH RBC QN AUTO: 28.4 PG (ref 27–31)
MCHC RBC AUTO-ENTMCNC: 32.7 G/DL (ref 32–36)
MCV RBC AUTO: 87 FL (ref 80–96)
MONOCYTES # BLD AUTO: 0.71 K/UL (ref 0–0.8)
MONOCYTES NFR BLD AUTO: 6.1 % (ref 2–6)
MONOCYTES NFR BLD MANUAL: 8 % (ref 2–6)
MPC BLD CALC-MCNC: 10 FL (ref 9.4–12.4)
NEUTROPHILS # BLD AUTO: 5.26 K/UL (ref 1.8–7.7)
NEUTROPHILS NFR BLD AUTO: 44.8 % (ref 53–65)
NEUTS SEG NFR BLD MANUAL: 40 % (ref 50–62)
NRBC # BLD AUTO: 0 X10E3/UL
NRBC, AUTO (.00): 0 %
PLATELET # BLD AUTO: 389 K/UL (ref 150–400)
PLATELET MORPHOLOGY: ABNORMAL
POTASSIUM SERPL-SCNC: 4.4 MMOL/L (ref 3.5–5.1)
RBC # BLD AUTO: 4.68 M/UL (ref 4.2–5.4)
RBC MORPH BLD: NORMAL
SODIUM SERPL-SCNC: 136 MMOL/L (ref 136–145)
TSH SERPL DL<=0.005 MIU/L-ACNC: 3.42 UIU/ML (ref 0.36–3.74)
WBC # BLD AUTO: 11.73 K/UL (ref 4.5–11)

## 2023-03-27 PROCEDURE — 3008F BODY MASS INDEX DOCD: CPT | Mod: ,,, | Performed by: NURSE PRACTITIONER

## 2023-03-27 PROCEDURE — 99214 OFFICE O/P EST MOD 30 MIN: CPT | Mod: ,,, | Performed by: NURSE PRACTITIONER

## 2023-03-27 PROCEDURE — 1159F PR MEDICATION LIST DOCUMENTED IN MEDICAL RECORD: ICD-10-PCS | Mod: ,,, | Performed by: NURSE PRACTITIONER

## 2023-03-27 PROCEDURE — 85025 COMPLETE CBC W/AUTO DIFF WBC: CPT | Mod: ,,, | Performed by: CLINICAL MEDICAL LABORATORY

## 2023-03-27 PROCEDURE — 1160F RVW MEDS BY RX/DR IN RCRD: CPT | Mod: ,,, | Performed by: NURSE PRACTITIONER

## 2023-03-27 PROCEDURE — 80048 BASIC METABOLIC PANEL: ICD-10-PCS | Mod: ,,, | Performed by: CLINICAL MEDICAL LABORATORY

## 2023-03-27 PROCEDURE — 3075F PR MOST RECENT SYSTOLIC BLOOD PRESS GE 130-139MM HG: ICD-10-PCS | Mod: ,,, | Performed by: NURSE PRACTITIONER

## 2023-03-27 PROCEDURE — 1159F MED LIST DOCD IN RCRD: CPT | Mod: ,,, | Performed by: NURSE PRACTITIONER

## 2023-03-27 PROCEDURE — 3075F SYST BP GE 130 - 139MM HG: CPT | Mod: ,,, | Performed by: NURSE PRACTITIONER

## 2023-03-27 PROCEDURE — 85025 CBC WITH DIFFERENTIAL: ICD-10-PCS | Mod: ,,, | Performed by: CLINICAL MEDICAL LABORATORY

## 2023-03-27 PROCEDURE — 3080F DIAST BP >= 90 MM HG: CPT | Mod: ,,, | Performed by: NURSE PRACTITIONER

## 2023-03-27 PROCEDURE — 84443 TSH: ICD-10-PCS | Mod: ,,, | Performed by: CLINICAL MEDICAL LABORATORY

## 2023-03-27 PROCEDURE — 1160F PR REVIEW ALL MEDS BY PRESCRIBER/CLIN PHARMACIST DOCUMENTED: ICD-10-PCS | Mod: ,,, | Performed by: NURSE PRACTITIONER

## 2023-03-27 PROCEDURE — 84443 ASSAY THYROID STIM HORMONE: CPT | Mod: ,,, | Performed by: CLINICAL MEDICAL LABORATORY

## 2023-03-27 PROCEDURE — 3080F PR MOST RECENT DIASTOLIC BLOOD PRESSURE >= 90 MM HG: ICD-10-PCS | Mod: ,,, | Performed by: NURSE PRACTITIONER

## 2023-03-27 PROCEDURE — 99214 PR OFFICE/OUTPT VISIT, EST, LEVL IV, 30-39 MIN: ICD-10-PCS | Mod: ,,, | Performed by: NURSE PRACTITIONER

## 2023-03-27 PROCEDURE — 3008F PR BODY MASS INDEX (BMI) DOCUMENTED: ICD-10-PCS | Mod: ,,, | Performed by: NURSE PRACTITIONER

## 2023-03-27 PROCEDURE — 80048 BASIC METABOLIC PNL TOTAL CA: CPT | Mod: ,,, | Performed by: CLINICAL MEDICAL LABORATORY

## 2023-03-27 NOTE — PROGRESS NOTES
Alexsnadra Pulliam DNP, FEDE    [address] [place] [address]     PATIENT NAME: Nikkie Brown  : 1965  DATE: 3/27/23  MRN: [id number]      Billing Provider: Alexsandra Pulliam DNP, FNP  Level of Service:   Patient PCP Information       Provider PCP Type    Alejandra Vann MD General            Reason for Visit / Chief Complaint: Cramping (States she is having cramps all over her body. She thinks her thyroid levels may still be abnormal. States most of the time, the cramping is in her stomach. ) and Fatigue (Patient states she feels very tired and very dehydrated, like she cannot get enough water.)       Update PCP  Update Chief Complaint         History of Present Illness / Problem Focused Workflow     Nikkie Brown presents to the clinic with Cramping (States she is having cramps all over her body. She thinks her thyroid levels may still be abnormal. States most of the time, the cramping is in her stomach. ) and Fatigue (Patient states she feels very tired and very dehydrated, like she cannot get enough water.)     Fatigue  Associated symptoms include fatigue. Pertinent negatives include no abdominal pain, arthralgias, change in bowel habit, chest pain, chills, congestion, coughing, fever, headaches, myalgias, nausea, rash, sore throat, vomiting or weakness.     Review of Systems     Review of Systems   Constitutional:  Positive for fatigue. Negative for activity change, appetite change, chills and fever.   HENT:  Negative for nasal congestion, ear pain, hearing loss, postnasal drip and sore throat.    Respiratory:  Negative for cough, chest tightness, shortness of breath and wheezing.    Cardiovascular:  Negative for chest pain, palpitations, leg swelling and claudication.   Gastrointestinal:  Negative for abdominal pain, change in bowel habit, constipation, diarrhea, nausea, vomiting and change in bowel habit.   Genitourinary:  Negative for dysuria.    Musculoskeletal:  Negative for arthralgias, back pain, gait problem and myalgias.   Integumentary:  Negative for rash.   Neurological:  Negative for weakness and headaches.   Psychiatric/Behavioral:  Negative for suicidal ideas. The patient is not nervous/anxious.       Medical / Social / Family History     Past Medical History:   Diagnosis Date    Anxiety     Bipolar disorder     Hypertension     Hypothyroidism     Polyp of descending colon 05/21/2021    Rectal polyp 05/21/2021    Screening for malignant neoplasm of colon 05/21/2021       Past Surgical History:   Procedure Laterality Date     left wrist repair      ANKLE SURGERY Left 2001    broke ankle has a plate in this ankle    APPLICATION OF EXTERNAL FIXATION DEVICE TO UPPER EXTREMITY Left 03/03/2022    Procedure: APPLICATION, EXTERNAL FIXATION DEVICE, SMALL, HAND OR WRIST;  Surgeon: Vidal Hernandez MD;  Location: [facility];  Service: Orthopedics;  Laterality: Left;    FRACTURE SURGERY      TUBAL LIGATION  1997       Social History  Ms. Nikkie Brown  reports that she has been smoking cigarettes. She started smoking about 25 years ago. She has a 25.00 pack-year smoking history. She has been exposed to tobacco smoke. She has never used smokeless tobacco. She reports that she does not currently use alcohol. She reports current drug use. Frequency: 3.00 times per week. Drug: Marijuana.    Family History  Ms. Nikkie Brown's family history includes Cancer in her father; Heart disease in her maternal grandmother; Hypertension in her father and sister; Mental retardation in her father and sister; No Known Problems in her brother; Stroke in her maternal grandmother.    Medications and Allergies     Medications  Outpatient Medications Marked as Taking for the 3/27/23 encounter (Office Visit) with Alexsandra Pulliam, AGNIESZKA, FNP   Medication Sig Dispense Refill    amitriptyline (ELAVIL) 75 MG tablet Take 1 tablet (75 mg total) by mouth nightly. 90 tablet 1     "ARIPiprazole (ABILIFY) 15 MG Tab Take 1 tablet (15 mg total) by mouth once daily. 90 tablet 1    busPIRone (BUSPAR) 30 MG Tab Take 1 tablet (30 mg total) by mouth 2 (two) times daily. 180 tablet 1    hydrALAZINE (APRESOLINE) 25 MG tablet Take 1 tablet (25 mg total) by mouth every 8 (eight) hours. 270 tablet 1    hydroCHLOROthiazide (HYDRODIURIL) 25 MG tablet Take 1 tablet (25 mg total) by mouth once daily. 90 tablet 1    levothyroxine (SYNTHROID) 100 MCG tablet Take 1 tablet (100 mcg total) by mouth before breakfast. 90 tablet 1    lisinopriL (PRINIVIL,ZESTRIL) 40 MG tablet Take 1 tablet (40 mg total) by mouth once daily. 90 tablet 1    metoprolol succinate (TOPROL-XL) 25 MG 24 hr tablet Take 1 tablet (25 mg total) by mouth once daily. 90 tablet 1    traZODone (DESYREL) 300 MG tablet Take 1 tablet (300 mg total) by mouth nightly. 90 tablet 1       Allergies  Review of patient's allergies indicates:  No Known Allergies    Physical Examination     Vitals:    03/27/23 1606 03/27/23 1611   BP: (!) 153/92 (!) 132/92   BP Location: Left arm Right arm   Patient Position: Sitting Sitting   BP Method: Large (Automatic) Large (Automatic)   Pulse: 102    Resp: 16    Temp: 98.5 °F (36.9 °C)    TempSrc: Oral    SpO2: 98%    Weight: 105.4 kg (232 lb 6.4 oz)    Height: 5' 7" (1.702 m)      Physical Exam  Vitals and nursing note reviewed.   Constitutional:       General: She is not in acute distress.     Appearance: Normal appearance. She is not ill-appearing.   Eyes:      Extraocular Movements: Extraocular movements intact.      Pupils: Pupils are equal, round, and reactive to light.   Cardiovascular:      Rate and Rhythm: Normal rate and regular rhythm.      Heart sounds: Normal heart sounds.   Pulmonary:      Effort: Pulmonary effort is normal.      Breath sounds: Normal breath sounds.   Abdominal:      General: Bowel sounds are normal.      Palpations: Abdomen is soft.   Musculoskeletal:         General: Normal range of motion. "   Skin:     Findings: No rash.   Neurological:      General: No focal deficit present.      Mental Status: She is alert and oriented to person, place, and time. Mental status is at baseline.   Psychiatric:         Mood and Affect: Mood normal.         Behavior: Behavior normal.        Assessment and Plan (including Health Maintenance)      Problem List  Smart Sets  Document Outside HM   :    Plan:         Health Maintenance Due   Topic Date Due    Hepatitis C Screening  Never done    Hemoglobin A1c (Diabetic Prevention Screening)  Never done    LDCT Lung Screen  Never done    Mammogram  05/10/2023       Problem List Items Addressed This Visit          Cardiac/Vascular    Essential hypertension (Chronic)    Relevant Orders    CBC Auto Differential (Completed)    Basic Metabolic Panel (Completed)       Renal/    Stage 3b chronic kidney disease (Chronic)    Relevant Orders    CBC Auto Differential (Completed)    Basic Metabolic Panel (Completed)       Endocrine    Hypothyroidism - Primary (Chronic)    Relevant Orders    TSH (Completed)     Hypothyroidism, unspecified type  -     TSH; Future; Expected date: 03/27/2023    Stage 3b chronic kidney disease  -     CBC Auto Differential; Future; Expected date: 03/27/2023  -     Basic Metabolic Panel; Future; Expected date: 03/27/2023    Essential hypertension  -     CBC Auto Differential; Future; Expected date: 03/27/2023  -     Basic Metabolic Panel; Future; Expected date: 03/27/2023       Health Maintenance Topics with due status: Not Due       Topic Last Completion Date    Cervical Cancer Screening 12/18/2020    Colorectal Cancer Screening 05/21/2021    Lipid Panel 02/20/2023         Future Appointments   Date Time Provider Department Center   4/17/2023  1:00 PM Alejandra Vann MD [facility]   5/16/2023  9:30 AM [facility]   8/21/2023  8:15 AM Alejandra Vann MD [facility]        Follow up if symptoms  worsen or fail to improve.     Signature:  Alexsandra Pulliam DNP, FNP  [address] [place] [address]  Phone #: [phone]  Fax #: [phone]    Date of encounter: 3/27/23    Patient Instructions   Increase water intake. Await labs. Continue current meds.

## 2023-04-17 ENCOUNTER — HOSPITAL ENCOUNTER (OUTPATIENT)
Dept: RADIOLOGY | Facility: HOSPITAL | Age: 58
Discharge: HOME | End: 2023-04-17
Attending: FAMILY MEDICINE
Payer: MEDICARE

## 2023-04-17 ENCOUNTER — OFFICE VISIT (OUTPATIENT)
Dept: FAMILY MEDICINE | Facility: CLINIC | Age: 58
End: 2023-04-17
Payer: MEDICARE

## 2023-04-17 VITALS
BODY MASS INDEX: 36.57 KG/M2 | TEMPERATURE: 99 F | WEIGHT: 233 LBS | SYSTOLIC BLOOD PRESSURE: 117 MMHG | DIASTOLIC BLOOD PRESSURE: 75 MMHG | HEIGHT: 67 IN | OXYGEN SATURATION: 98 % | RESPIRATION RATE: 18 BRPM | HEART RATE: 86 BPM

## 2023-04-17 DIAGNOSIS — F17.210 CIGARETTE NICOTINE DEPENDENCE WITHOUT COMPLICATION: Chronic | ICD-10-CM

## 2023-04-17 DIAGNOSIS — R06.09 DYSPNEA ON EXERTION: ICD-10-CM

## 2023-04-17 DIAGNOSIS — M62.838 MUSCLE SPASM: ICD-10-CM

## 2023-04-17 DIAGNOSIS — R10.9 ACUTE RIGHT FLANK PAIN: Primary | ICD-10-CM

## 2023-04-17 DIAGNOSIS — E66.01 SEVERE OBESITY (BMI 35.0-39.9) WITH COMORBIDITY: Chronic | ICD-10-CM

## 2023-04-17 LAB
ANION GAP SERPL CALCULATED.3IONS-SCNC: 11 MMOL/L (ref 7–16)
BILIRUB SERPL-MCNC: NEGATIVE MG/DL
BLOOD URINE, POC: NEGATIVE
BUN SERPL-MCNC: 10 MG/DL (ref 7–18)
BUN/CREAT SERPL: 6 (ref 6–20)
CALCIUM SERPL-MCNC: 9.2 MG/DL (ref 8.5–10.1)
CHLORIDE SERPL-SCNC: 103 MMOL/L (ref 98–107)
CO2 SERPL-SCNC: 29 MMOL/L (ref 21–32)
COLOR, POC UA: YELLOW
CREAT SERPL-MCNC: 1.65 MG/DL (ref 0.55–1.02)
EGFR (NO RACE VARIABLE) (RUSH/TITUS): 36 ML/MIN/1.73M²
GLUCOSE SERPL-MCNC: 97 MG/DL (ref 74–106)
GLUCOSE UR QL STRIP: NEGATIVE
KETONES UR QL STRIP: NEGATIVE
LEUKOCYTE ESTERASE URINE, POC: NORMAL
MAGNESIUM SERPL-MCNC: 2 MG/DL (ref 1.7–2.3)
NITRITE, POC UA: NEGATIVE
PH, POC UA: 6.5
POTASSIUM SERPL-SCNC: 4.7 MMOL/L (ref 3.5–5.1)
PROTEIN, POC: NEGATIVE
SODIUM SERPL-SCNC: 138 MMOL/L (ref 136–145)
SPECIFIC GRAVITY, POC UA: 1.01
UROBILINOGEN, POC UA: 0.2

## 2023-04-17 PROCEDURE — 80048 BASIC METABOLIC PANEL: ICD-10-PCS | Mod: ,,, | Performed by: CLINICAL MEDICAL LABORATORY

## 2023-04-17 PROCEDURE — 71046 X-RAY EXAM CHEST 2 VIEWS: CPT | Mod: TC,PN

## 2023-04-17 PROCEDURE — 4010F ACE/ARB THERAPY RXD/TAKEN: CPT | Mod: ,,, | Performed by: FAMILY MEDICINE

## 2023-04-17 PROCEDURE — 4010F PR ACE/ARB THEARPY RXD/TAKEN: ICD-10-PCS | Mod: ,,, | Performed by: FAMILY MEDICINE

## 2023-04-17 PROCEDURE — 1159F PR MEDICATION LIST DOCUMENTED IN MEDICAL RECORD: ICD-10-PCS | Mod: ,,, | Performed by: FAMILY MEDICINE

## 2023-04-17 PROCEDURE — 83735 ASSAY OF MAGNESIUM: CPT | Mod: ,,, | Performed by: CLINICAL MEDICAL LABORATORY

## 2023-04-17 PROCEDURE — 3078F PR MOST RECENT DIASTOLIC BLOOD PRESSURE < 80 MM HG: ICD-10-PCS | Mod: ,,, | Performed by: FAMILY MEDICINE

## 2023-04-17 PROCEDURE — 3074F SYST BP LT 130 MM HG: CPT | Mod: ,,, | Performed by: FAMILY MEDICINE

## 2023-04-17 PROCEDURE — 80048 BASIC METABOLIC PNL TOTAL CA: CPT | Mod: ,,, | Performed by: CLINICAL MEDICAL LABORATORY

## 2023-04-17 PROCEDURE — 99213 PR OFFICE/OUTPT VISIT, EST, LEVL III, 20-29 MIN: ICD-10-PCS | Mod: ,,, | Performed by: FAMILY MEDICINE

## 2023-04-17 PROCEDURE — 83735 MAGNESIUM: ICD-10-PCS | Mod: ,,, | Performed by: CLINICAL MEDICAL LABORATORY

## 2023-04-17 PROCEDURE — 3008F PR BODY MASS INDEX (BMI) DOCUMENTED: ICD-10-PCS | Mod: ,,, | Performed by: FAMILY MEDICINE

## 2023-04-17 PROCEDURE — 1159F MED LIST DOCD IN RCRD: CPT | Mod: ,,, | Performed by: FAMILY MEDICINE

## 2023-04-17 PROCEDURE — 81003 URINALYSIS AUTO W/O SCOPE: CPT | Mod: QW,,, | Performed by: FAMILY MEDICINE

## 2023-04-17 PROCEDURE — 81003 POCT URINALYSIS W/O SCOPE: ICD-10-PCS | Mod: QW,,, | Performed by: FAMILY MEDICINE

## 2023-04-17 PROCEDURE — 1160F RVW MEDS BY RX/DR IN RCRD: CPT | Mod: ,,, | Performed by: FAMILY MEDICINE

## 2023-04-17 PROCEDURE — 3078F DIAST BP <80 MM HG: CPT | Mod: ,,, | Performed by: FAMILY MEDICINE

## 2023-04-17 PROCEDURE — 99213 OFFICE O/P EST LOW 20 MIN: CPT | Mod: ,,, | Performed by: FAMILY MEDICINE

## 2023-04-17 PROCEDURE — 3008F BODY MASS INDEX DOCD: CPT | Mod: ,,, | Performed by: FAMILY MEDICINE

## 2023-04-17 PROCEDURE — 1160F PR REVIEW ALL MEDS BY PRESCRIBER/CLIN PHARMACIST DOCUMENTED: ICD-10-PCS | Mod: ,,, | Performed by: FAMILY MEDICINE

## 2023-04-17 PROCEDURE — 3074F PR MOST RECENT SYSTOLIC BLOOD PRESSURE < 130 MM HG: ICD-10-PCS | Mod: ,,, | Performed by: FAMILY MEDICINE

## 2023-04-17 NOTE — PROGRESS NOTES
New Clinic Note    Nikkie Brown is a 58 y.o. female     CC:   Chief Complaint   Patient presents with    Back Pain     Patient complains of 6 months of general muscle discomfort. Complains of right flank discomfort. Stated she is due for lab work.     Nicotine Dependence     Patient is on disability for mental issues. Long term smoker.         Subjective    History of Present Illness HPI   Patient complains of right flank pain for several months. Patient complains of muscle cramps. She complains of shortness of breath with exertion. She is a long term smoker.     Current Outpatient Medications:     amitriptyline (ELAVIL) 75 MG tablet, Take 1 tablet (75 mg total) by mouth nightly., Disp: 90 tablet, Rfl: 1    ARIPiprazole (ABILIFY) 15 MG Tab, Take 1 tablet (15 mg total) by mouth once daily., Disp: 90 tablet, Rfl: 1    busPIRone (BUSPAR) 30 MG Tab, Take 1 tablet (30 mg total) by mouth 2 (two) times daily., Disp: 180 tablet, Rfl: 1    hydrALAZINE (APRESOLINE) 25 MG tablet, Take 1 tablet (25 mg total) by mouth every 8 (eight) hours., Disp: 270 tablet, Rfl: 1    hydroCHLOROthiazide (HYDRODIURIL) 25 MG tablet, Take 1 tablet (25 mg total) by mouth once daily., Disp: 90 tablet, Rfl: 1    levothyroxine (SYNTHROID) 100 MCG tablet, Take 1 tablet (100 mcg total) by mouth before breakfast., Disp: 90 tablet, Rfl: 1    lisinopriL (PRINIVIL,ZESTRIL) 40 MG tablet, Take 1 tablet (40 mg total) by mouth once daily., Disp: 90 tablet, Rfl: 1    metoprolol succinate (TOPROL-XL) 25 MG 24 hr tablet, Take 1 tablet (25 mg total) by mouth once daily., Disp: 90 tablet, Rfl: 1    traZODone (DESYREL) 300 MG tablet, Take 1 tablet (300 mg total) by mouth nightly., Disp: 90 tablet, Rfl: 1    naproxen (NAPROSYN) 500 MG tablet, Take 1 tablet (500 mg total) by mouth 2 (two) times daily. (Patient not taking: Reported on 3/27/2023), Disp: 180 tablet, Rfl: 1     Past Medical History:   Diagnosis Date    Anxiety     Bipolar disorder     Hypertension      "Hypothyroidism     Polyp of descending colon 05/21/2021    Rectal polyp 05/21/2021    Screening for malignant neoplasm of colon 05/21/2021        Family History   Problem Relation Age of Onset    Hypertension Father     Mental retardation Father     Cancer Father     Hypertension Sister     Mental retardation Sister     Stroke Maternal Grandmother     Heart disease Maternal Grandmother     No Known Problems Brother         Past Surgical History:   Procedure Laterality Date     left wrist repair      ANKLE SURGERY Left 2001    broke ankle has a plate in this ankle    APPLICATION OF EXTERNAL FIXATION DEVICE TO UPPER EXTREMITY Left 03/03/2022    Procedure: APPLICATION, EXTERNAL FIXATION DEVICE, SMALL, HAND OR WRIST;  Surgeon: Vidal Hernandez MD;  Location: [facility];  Service: Orthopedics;  Laterality: Left;    FRACTURE SURGERY      TUBAL LIGATION  1997        Review of Systems   Constitutional:  Negative for fatigue and fever.   HENT:  Negative for ear pain, postnasal drip, rhinorrhea and sinus pressure/congestion.    Respiratory:  Positive for shortness of breath. Negative for cough.    Cardiovascular:  Negative for chest pain.   Gastrointestinal:  Negative for abdominal pain, diarrhea, nausea and vomiting.   Genitourinary:  Negative for dysuria.   Musculoskeletal:  Positive for back pain.   Neurological:  Negative for headaches.      /75 (BP Location: Left arm, Patient Position: Sitting, BP Method: Large (Automatic))   Pulse 86   Temp 98.7 °F (37.1 °C) (Oral)   Resp 18   Ht 5' 7" (1.702 m)   Wt 105.7 kg (233 lb)   SpO2 98%   BMI 36.49 kg/m²      Physical Exam  HENT:      Head: Normocephalic and atraumatic.      Mouth/Throat:      Pharynx: Oropharynx is clear.   Cardiovascular:      Rate and Rhythm: Normal rate and regular rhythm.   Pulmonary:      Effort: Pulmonary effort is normal.      Breath sounds: Normal breath sounds.   Neurological:      Mental Status: She is alert and oriented to " person, place, and time.   Psychiatric:         Mood and Affect: Mood normal.         Behavior: Behavior normal.        Assessment and Plan      ICD-10-CM ICD-9-CM   1. Acute right flank pain  R10.9 789.09     338.19   2. Dyspnea on exertion  R06.09 786.09   3. Cigarette nicotine dependence without complication  F17.210 305.1   4. Muscle spasm  M62.838 728.85   5. Severe obesity (BMI 35.0-39.9) with comorbidity  E66.01 278.01        1. Acute right flank pain  -     POCT URINALYSIS W/O SCOPE    2. Dyspnea on exertion  -     X-Ray Chest PA And Lateral; Future; Expected date: 04/17/2023  -     Complete PFT w/ bronchodilator; Future    3. Cigarette nicotine dependence without complication  Patient is not ready to quit. Educational handouts given.     4. Muscle spasm  -     Basic Metabolic Panel; Future; Expected date: 04/17/2023  -     Magnesium; Future; Expected date: 04/17/2023    5. Severe obesity (BMI 35.0-39.9) with comorbidity  Diet and educational handouts given.        Follow up if symptoms worsen or fail to improve.

## 2023-04-24 ENCOUNTER — PROCEDURE VISIT (OUTPATIENT)
Dept: RESPIRATORY THERAPY | Facility: HOSPITAL | Age: 58
End: 2023-04-24
Attending: FAMILY MEDICINE
Payer: MEDICARE

## 2023-04-24 DIAGNOSIS — R06.09 DYSPNEA ON EXERTION: ICD-10-CM

## 2023-04-24 PROCEDURE — 94010 BREATHING CAPACITY TEST: CPT | Mod: 26 | Performed by: INTERNAL MEDICINE

## 2023-04-25 NOTE — PROCEDURES
Spirometry  Forced vital capacity 2.83 liters 83 percent predicted   FEV1 2.44 liters 88 percent predicted FEV1 ratio 85 percent normal small airways normal spirometry

## 2023-04-26 ENCOUNTER — OFFICE VISIT (OUTPATIENT)
Dept: FAMILY MEDICINE | Facility: CLINIC | Age: 58
End: 2023-04-26
Payer: MEDICARE

## 2023-04-26 ENCOUNTER — HOSPITAL ENCOUNTER (OUTPATIENT)
Dept: RADIOLOGY | Facility: HOSPITAL | Age: 58
Discharge: HOME | End: 2023-04-26
Attending: FAMILY MEDICINE
Payer: MEDICARE

## 2023-04-26 VITALS
OXYGEN SATURATION: 99 % | SYSTOLIC BLOOD PRESSURE: 146 MMHG | DIASTOLIC BLOOD PRESSURE: 98 MMHG | WEIGHT: 239 LBS | RESPIRATION RATE: 18 BRPM | HEIGHT: 67 IN | HEART RATE: 77 BPM | TEMPERATURE: 99 F | BODY MASS INDEX: 37.51 KG/M2

## 2023-04-26 DIAGNOSIS — M79.645 THUMB PAIN, LEFT: Primary | ICD-10-CM

## 2023-04-26 DIAGNOSIS — F17.210 CIGARETTE NICOTINE DEPENDENCE WITHOUT COMPLICATION: Chronic | ICD-10-CM

## 2023-04-26 DIAGNOSIS — N18.32 STAGE 3B CHRONIC KIDNEY DISEASE: Chronic | ICD-10-CM

## 2023-04-26 DIAGNOSIS — I10 ESSENTIAL HYPERTENSION: Chronic | ICD-10-CM

## 2023-04-26 DIAGNOSIS — M79.645 THUMB PAIN, LEFT: ICD-10-CM

## 2023-04-26 PROCEDURE — 96372 PR INJECTION,THERAP/PROPH/DIAG2ST, IM OR SUBCUT: ICD-10-PCS | Mod: ,,, | Performed by: FAMILY MEDICINE

## 2023-04-26 PROCEDURE — 3077F SYST BP >= 140 MM HG: CPT | Mod: ,,, | Performed by: FAMILY MEDICINE

## 2023-04-26 PROCEDURE — 1160F PR REVIEW ALL MEDS BY PRESCRIBER/CLIN PHARMACIST DOCUMENTED: ICD-10-PCS | Mod: ,,, | Performed by: FAMILY MEDICINE

## 2023-04-26 PROCEDURE — 1160F RVW MEDS BY RX/DR IN RCRD: CPT | Mod: ,,, | Performed by: FAMILY MEDICINE

## 2023-04-26 PROCEDURE — 1159F MED LIST DOCD IN RCRD: CPT | Mod: ,,, | Performed by: FAMILY MEDICINE

## 2023-04-26 PROCEDURE — 1159F PR MEDICATION LIST DOCUMENTED IN MEDICAL RECORD: ICD-10-PCS | Mod: ,,, | Performed by: FAMILY MEDICINE

## 2023-04-26 PROCEDURE — 99214 OFFICE O/P EST MOD 30 MIN: CPT | Mod: 25,,, | Performed by: FAMILY MEDICINE

## 2023-04-26 PROCEDURE — 99214 PR OFFICE/OUTPT VISIT, EST, LEVL IV, 30-39 MIN: ICD-10-PCS | Mod: 25,,, | Performed by: FAMILY MEDICINE

## 2023-04-26 PROCEDURE — 3080F PR MOST RECENT DIASTOLIC BLOOD PRESSURE >= 90 MM HG: ICD-10-PCS | Mod: ,,, | Performed by: FAMILY MEDICINE

## 2023-04-26 PROCEDURE — 3008F BODY MASS INDEX DOCD: CPT | Mod: ,,, | Performed by: FAMILY MEDICINE

## 2023-04-26 PROCEDURE — 73130 X-RAY EXAM OF HAND: CPT | Mod: TC,PN,LT

## 2023-04-26 PROCEDURE — 3077F PR MOST RECENT SYSTOLIC BLOOD PRESSURE >= 140 MM HG: ICD-10-PCS | Mod: ,,, | Performed by: FAMILY MEDICINE

## 2023-04-26 PROCEDURE — 96372 THER/PROPH/DIAG INJ SC/IM: CPT | Mod: ,,, | Performed by: FAMILY MEDICINE

## 2023-04-26 PROCEDURE — 4010F ACE/ARB THERAPY RXD/TAKEN: CPT | Mod: ,,, | Performed by: FAMILY MEDICINE

## 2023-04-26 PROCEDURE — 3080F DIAST BP >= 90 MM HG: CPT | Mod: ,,, | Performed by: FAMILY MEDICINE

## 2023-04-26 PROCEDURE — 3008F PR BODY MASS INDEX (BMI) DOCUMENTED: ICD-10-PCS | Mod: ,,, | Performed by: FAMILY MEDICINE

## 2023-04-26 PROCEDURE — 4010F PR ACE/ARB THEARPY RXD/TAKEN: ICD-10-PCS | Mod: ,,, | Performed by: FAMILY MEDICINE

## 2023-04-26 RX ORDER — DEXAMETHASONE SODIUM PHOSPHATE 4 MG/ML
4 INJECTION, SOLUTION INTRA-ARTICULAR; INTRALESIONAL; INTRAMUSCULAR; INTRAVENOUS; SOFT TISSUE
Status: COMPLETED | OUTPATIENT
Start: 2023-04-26 | End: 2023-04-26

## 2023-04-26 RX ORDER — METOPROLOL SUCCINATE 25 MG/1
25 TABLET, EXTENDED RELEASE ORAL DAILY
Qty: 90 TABLET | Refills: 1 | Status: SHIPPED | OUTPATIENT
Start: 2023-04-26 | End: 2023-08-21 | Stop reason: SDUPTHER

## 2023-04-26 RX ORDER — METHYLPREDNISOLONE ACETATE 40 MG/ML
40 INJECTION, SUSPENSION INTRA-ARTICULAR; INTRALESIONAL; INTRAMUSCULAR; SOFT TISSUE
Status: COMPLETED | OUTPATIENT
Start: 2023-04-26 | End: 2023-04-26

## 2023-04-26 RX ADMIN — METHYLPREDNISOLONE ACETATE 40 MG: 40 INJECTION, SUSPENSION INTRA-ARTICULAR; INTRALESIONAL; INTRAMUSCULAR; SOFT TISSUE at 03:04

## 2023-04-26 RX ADMIN — DEXAMETHASONE SODIUM PHOSPHATE 4 MG: 4 INJECTION, SOLUTION INTRA-ARTICULAR; INTRALESIONAL; INTRAMUSCULAR; INTRAVENOUS; SOFT TISSUE at 03:04

## 2023-04-26 NOTE — PROGRESS NOTES
New Clinic Note    Nikkie Brown is a 58 y.o. female     CC:   Chief Complaint   Patient presents with    Thumb     Patient states she woke up around 3 am this morning due to her hand throbbing. Pain level 8/10 this morning. Thumb was not swollen before she went to sleep.  Pain starts at thumb and radiates up wrist. 7/10 currently. Has not taking any medications yet.     Lab      Patient states she would like to know why her kidney levels are always up. She reports that she drinks plenty of fluids. She denies taking any NSAIDS.  Does not recall any family members with kidney issues.         Subjective    History of Present Illness HPI   Patient is here for evaluation of chronic medical problems. She needs refills. She complains of left thumb pain that started this morning. She reports that it is swollen. She has not taken anything for it. She wants to see a nephrologist because her creatinine stays elevated. She denies NSAID use.     Current Outpatient Medications:     amitriptyline (ELAVIL) 75 MG tablet, Take 1 tablet (75 mg total) by mouth nightly., Disp: 90 tablet, Rfl: 1    ARIPiprazole (ABILIFY) 15 MG Tab, Take 1 tablet (15 mg total) by mouth once daily., Disp: 90 tablet, Rfl: 1    busPIRone (BUSPAR) 30 MG Tab, Take 1 tablet (30 mg total) by mouth 2 (two) times daily., Disp: 180 tablet, Rfl: 1    hydrALAZINE (APRESOLINE) 25 MG tablet, Take 1 tablet (25 mg total) by mouth every 8 (eight) hours., Disp: 270 tablet, Rfl: 1    hydroCHLOROthiazide (HYDRODIURIL) 25 MG tablet, Take 1 tablet (25 mg total) by mouth once daily., Disp: 90 tablet, Rfl: 1    levothyroxine (SYNTHROID) 100 MCG tablet, Take 1 tablet (100 mcg total) by mouth before breakfast., Disp: 90 tablet, Rfl: 1    lisinopriL (PRINIVIL,ZESTRIL) 40 MG tablet, Take 1 tablet (40 mg total) by mouth once daily., Disp: 90 tablet, Rfl: 1    metoprolol succinate (TOPROL-XL) 25 MG 24 hr tablet, Take 1 tablet (25 mg total) by mouth once daily., Disp: 90 tablet,  "Rfl: 1    traZODone (DESYREL) 300 MG tablet, Take 1 tablet (300 mg total) by mouth nightly., Disp: 90 tablet, Rfl: 1     Past Medical History:   Diagnosis Date    Anxiety     Bipolar disorder     Hypertension     Hypothyroidism     Polyp of descending colon 05/21/2021    Rectal polyp 05/21/2021    Screening for malignant neoplasm of colon 05/21/2021        Family History   Problem Relation Age of Onset    Hypertension Father     Mental retardation Father     Cancer Father     Hypertension Sister     Mental retardation Sister     Stroke Maternal Grandmother     Heart disease Maternal Grandmother     No Known Problems Brother         Past Surgical History:   Procedure Laterality Date     left wrist repair      ANKLE SURGERY Left 2001    broke ankle has a plate in this ankle    APPLICATION OF EXTERNAL FIXATION DEVICE TO UPPER EXTREMITY Left 03/03/2022    Procedure: APPLICATION, EXTERNAL FIXATION DEVICE, SMALL, HAND OR WRIST;  Surgeon: Vidal Hernandez MD;  Location: [facility];  Service: Orthopedics;  Laterality: Left;    FRACTURE SURGERY      TUBAL LIGATION  1997        Review of Systems   Constitutional:  Negative for fatigue and fever.   HENT:  Negative for ear pain, postnasal drip, rhinorrhea and sinus pressure/congestion.    Respiratory:  Negative for cough and shortness of breath.    Cardiovascular:  Negative for chest pain.   Gastrointestinal:  Negative for abdominal pain, diarrhea, nausea and vomiting.   Genitourinary:  Negative for dysuria.   Neurological:  Negative for headaches.      BP (!) 146/98 (BP Location: Right arm, Patient Position: Sitting, BP Method: Large (Manual))   Pulse 77   Temp 98.5 °F (36.9 °C) (Oral)   Resp 18   Ht 5' 7" (1.702 m)   Wt 108.4 kg (239 lb)   SpO2 99%   BMI 37.43 kg/m²      Physical Exam  HENT:      Head: Normocephalic and atraumatic.   Cardiovascular:      Rate and Rhythm: Normal rate and regular rhythm.   Pulmonary:      Effort: Pulmonary effort is normal.      " Breath sounds: Normal breath sounds.   Musculoskeletal:      Left hand: Tenderness present. No swelling.      Comments: Tenderness in left thumb   Neurological:      Mental Status: She is alert and oriented to person, place, and time.   Psychiatric:         Mood and Affect: Mood normal.         Behavior: Behavior normal.        Assessment and Plan      ICD-10-CM ICD-9-CM   1. Thumb pain, left  M79.645 729.5   2. Essential hypertension  I10 401.9   3. Stage 3b chronic kidney disease  N18.32 585.3   4. Cigarette nicotine dependence without complication  F17.210 305.1   5. BMI 37.0-37.9, adult  Z68.37 V85.37        1. Thumb pain, left  -     X-Ray Hand 3 View Left; Future; Expected date: 04/26/2023  -     Ambulatory referral/consult to Orthopedics; Future; Expected date: 05/03/2023  -     methylPREDNISolone acetate injection 40 mg  -     dexAMETHasone injection 4 mg    2. Essential hypertension  The current medical regimen is effective;  continue present plan and medications.  -     metoprolol succinate (TOPROL-XL) 25 MG 24 hr tablet; Take 1 tablet (25 mg total) by mouth once daily.  Dispense: 90 tablet; Refill: 1    3. Stage 3b chronic kidney disease  Stable. Refer to nephrology per patient request.   -     Ambulatory referral/consult to Nephrology; Future; Expected date: 05/03/2023       Diet handout given for BMI. Patient is not interested in quitting smoking at this time. Educational handout given.     Follow up in about 6 months (around 10/26/2023).

## 2023-05-16 ENCOUNTER — HOSPITAL ENCOUNTER (OUTPATIENT)
Dept: RADIOLOGY | Facility: HOSPITAL | Age: 58
Discharge: HOME | End: 2023-05-16
Payer: MEDICARE

## 2023-05-16 DIAGNOSIS — Z12.31 BREAST CANCER SCREENING BY MAMMOGRAM: ICD-10-CM

## 2023-05-16 PROCEDURE — 77067 SCR MAMMO BI INCL CAD: CPT | Mod: TC

## 2023-06-09 DIAGNOSIS — Z71.89 COMPLEX CARE COORDINATION: ICD-10-CM

## 2023-06-22 DIAGNOSIS — I10 ESSENTIAL HYPERTENSION: ICD-10-CM

## 2023-06-22 RX ORDER — HYDROCHLOROTHIAZIDE 25 MG/1
TABLET ORAL
Qty: 90 TABLET | Refills: 1 | Status: SHIPPED | OUTPATIENT
Start: 2023-06-22 | End: 2023-08-21 | Stop reason: SDUPTHER

## 2023-06-22 RX ORDER — LEVOTHYROXINE SODIUM 88 UG/1
TABLET ORAL
COMMUNITY
Start: 2023-05-10 | End: 2023-08-14 | Stop reason: DRUGHIGH

## 2023-06-22 RX ORDER — HYDROXYZINE PAMOATE 50 MG/1
50 CAPSULE ORAL NIGHTLY
COMMUNITY
Start: 2023-05-26

## 2023-06-22 RX ORDER — LISINOPRIL 40 MG/1
TABLET ORAL
Qty: 90 TABLET | Refills: 1 | Status: SHIPPED | OUTPATIENT
Start: 2023-06-22 | End: 2023-08-21 | Stop reason: SDUPTHER

## 2023-08-14 ENCOUNTER — OFFICE VISIT (OUTPATIENT)
Dept: NEPHROLOGY | Facility: CLINIC | Age: 58
End: 2023-08-14
Payer: MEDICARE

## 2023-08-14 VITALS
SYSTOLIC BLOOD PRESSURE: 152 MMHG | BODY MASS INDEX: 36.36 KG/M2 | HEIGHT: 67 IN | DIASTOLIC BLOOD PRESSURE: 100 MMHG | HEART RATE: 75 BPM | OXYGEN SATURATION: 95 % | RESPIRATION RATE: 18 BRPM | WEIGHT: 231.63 LBS

## 2023-08-14 DIAGNOSIS — I10 ESSENTIAL HYPERTENSION: ICD-10-CM

## 2023-08-14 DIAGNOSIS — N18.32 STAGE 3B CHRONIC KIDNEY DISEASE: Primary | Chronic | ICD-10-CM

## 2023-08-14 DIAGNOSIS — I12.9 HYPERTENSIVE NEPHROSCLEROSIS, STAGE 1 THROUGH STAGE 4 OR UNSPECIFIED CHRONIC KIDNEY DISEASE: ICD-10-CM

## 2023-08-14 PROCEDURE — 1159F PR MEDICATION LIST DOCUMENTED IN MEDICAL RECORD: ICD-10-PCS | Mod: CPTII,,, | Performed by: INTERNAL MEDICINE

## 2023-08-14 PROCEDURE — 99215 OFFICE O/P EST HI 40 MIN: CPT | Mod: PBBFAC | Performed by: INTERNAL MEDICINE

## 2023-08-14 PROCEDURE — 3008F PR BODY MASS INDEX (BMI) DOCUMENTED: ICD-10-PCS | Mod: CPTII,,, | Performed by: INTERNAL MEDICINE

## 2023-08-14 PROCEDURE — 3077F PR MOST RECENT SYSTOLIC BLOOD PRESSURE >= 140 MM HG: ICD-10-PCS | Mod: CPTII,,, | Performed by: INTERNAL MEDICINE

## 2023-08-14 PROCEDURE — 4010F PR ACE/ARB THEARPY RXD/TAKEN: ICD-10-PCS | Mod: CPTII,,, | Performed by: INTERNAL MEDICINE

## 2023-08-14 PROCEDURE — 3080F PR MOST RECENT DIASTOLIC BLOOD PRESSURE >= 90 MM HG: ICD-10-PCS | Mod: CPTII,,, | Performed by: INTERNAL MEDICINE

## 2023-08-14 PROCEDURE — 3066F PR DOCUMENTATION OF TREATMENT FOR NEPHROPATHY: ICD-10-PCS | Mod: CPTII,,, | Performed by: INTERNAL MEDICINE

## 2023-08-14 PROCEDURE — 3077F SYST BP >= 140 MM HG: CPT | Mod: CPTII,,, | Performed by: INTERNAL MEDICINE

## 2023-08-14 PROCEDURE — 3080F DIAST BP >= 90 MM HG: CPT | Mod: CPTII,,, | Performed by: INTERNAL MEDICINE

## 2023-08-14 PROCEDURE — 3066F NEPHROPATHY DOC TX: CPT | Mod: CPTII,,, | Performed by: INTERNAL MEDICINE

## 2023-08-14 PROCEDURE — 99204 PR OFFICE/OUTPT VISIT, NEW, LEVL IV, 45-59 MIN: ICD-10-PCS | Mod: S$PBB,,, | Performed by: INTERNAL MEDICINE

## 2023-08-14 PROCEDURE — 3008F BODY MASS INDEX DOCD: CPT | Mod: CPTII,,, | Performed by: INTERNAL MEDICINE

## 2023-08-14 PROCEDURE — 99204 OFFICE O/P NEW MOD 45 MIN: CPT | Mod: S$PBB,,, | Performed by: INTERNAL MEDICINE

## 2023-08-14 PROCEDURE — 4010F ACE/ARB THERAPY RXD/TAKEN: CPT | Mod: CPTII,,, | Performed by: INTERNAL MEDICINE

## 2023-08-14 PROCEDURE — 1159F MED LIST DOCD IN RCRD: CPT | Mod: CPTII,,, | Performed by: INTERNAL MEDICINE

## 2023-08-14 NOTE — PROGRESS NOTES
[facility] History and Physical  Patient Name: Nikkie Brown  MRN: [id number]  Age: 58 y.o.  : 1965    Date: 2023 1:59 PM    Chief Complaint: Establish Care    HPI :   Ms Brown presents to Nephrology clinic to establish care. She is followed by Alejandra Vann MD as her primary care provider.     HTN: diagnosed 19 years old. Current regimen includes hydralazine 25 mg TID, hydrochlorothiazide 25 mg daily, lisinopril 40 mg daily, toprol 25 mg daily. She is elevated in clinic today. She reports that she did not take her BP medications today.    Schizophrenia: follows with [facility]. On Abilify and buspar     Hypothyroidism: synthroid     Nephrology history: No FH of kidney disease, no nephrolithiasis, or recurrent UTIs. No OTC medications including NSAIDs. Used to take naproxen daily for pain. No longer doing so.     Patient denies any CP, SOB, peripheral edema, dysuria, hematuria, changes in urinary habits, or increased frequency of urination.     Past Medical History:  has a past medical history of Anxiety, Bipolar disorder, Hypertension, Hypothyroidism, Polyp of descending colon (2021), Rectal polyp (2021), and Screening for malignant neoplasm of colon (2021).     Past Surgical History:   has a past surgical history that includes Tubal ligation (); Ankle surgery (Left, ); Application of external fixation device to upper extremity (Left, 2022); Fracture surgery; and  left wrist repair.     Family History:  family history includes Cancer in her father; Heart disease in her maternal grandmother; Hypertension in her father and sister; Mental retardation in her father and sister; No Known Problems in her brother; Stroke in her maternal grandmother. No family history of kidney disease.     Social History:   reports that she has been smoking cigarettes. She started smoking about 25 years ago. She has a 25.6 pack-year smoking  history. She has been exposed to tobacco smoke. She has never used smokeless tobacco. She reports that she does not currently use alcohol. She reports current drug use. Frequency: 3.00 times per week. Drug: Marijuana.     Allergies: has No Known Allergies.     Medications: Reviewed including OTC medications, herbal supplements, and NSAIDS.     Old records have been reviewed.      Review of Systems:  ROS: A 10 point ROS was completed and found to be negative except for that mentioned above.          Physical Exam:  Vitals:    08/14/23 1356   BP: (!) 160/110   Pulse: 75   Resp: 18       Constitutional: sitting in chair, in NAD  Eyes: EOMI, white sclera  ENMT: moist mucus membranes, nares patent  Cardiovascular: normal rate, S1/S2 noted, no edema  Respiratory: symmetrical chest expansion, CTA-B  Gastrointestinal: +BS, soft, NT/ND  Musculoskeletal: normal, no joint erythema/effusions  Skin: no rash, no purpura, warm extremities  Neurological: Alert and Oriented x 4, afocal    Labs:   Lab Results   Component Value Date     04/17/2023    K 4.7 04/17/2023    CREATININE 1.65 (H) 04/17/2023    ALT 21 02/20/2023    AST 17 02/20/2023    LDLCALC 74 02/20/2023    CHOL 162 02/20/2023    HDL 34 (L) 02/20/2023    TRIG 271 (H) 02/20/2023    TSH 3.420 03/27/2023    WBC 11.73 (H) 03/27/2023    HGB 13.3 03/27/2023    HCT 40.7 03/27/2023     03/27/2023        Otherwise Reviewed    Assessment/Plan:     CKD stage IIIb in setting of hypertensive nephrosclerosis, NSAID use. Baseline sCr 1.5-1.7, today sCr pending. Counseled to avoid nephrotoxic agents such as NSAIDs. Will obtain baseline renal ultrasound. She is on ACEi which will help with CKD, HTN. Will check proteinuria labs to see if SGLT2i would be indicated in future.     Proteinuria: urine Prot:Creat ratio is pending. Patient is on RAAS blockade with ACEi    Anemia: CBC pending    BMD: Calcium and Phosphorus PTH, Vit D pending    HTN: well controlled with current meds      RTC 6 months with CBC, RFP, UA, urine for Prot:creat ratio, PTH, Vit D      Alisha S. Parker, DO [facility]   08/14/2023

## 2023-08-15 ENCOUNTER — TELEPHONE (OUTPATIENT)
Dept: NEPHROLOGY | Facility: CLINIC | Age: 58
End: 2023-08-15
Payer: MEDICARE

## 2023-08-15 RX ORDER — ERGOCALCIFEROL 1.25 MG/1
50000 CAPSULE ORAL
Qty: 12 CAPSULE | Refills: 0 | Status: SHIPPED | OUTPATIENT
Start: 2023-08-15 | End: 2023-11-01

## 2023-08-15 NOTE — TELEPHONE ENCOUNTER
----- Message from Zelda Mckeon DO sent at 8/15/2023  9:46 AM CDT -----  Please inform Ms Brown that her renal function is stable. Her k slightly high. Recommend low K diet. Also, please send ergo. ty

## 2023-08-21 ENCOUNTER — OFFICE VISIT (OUTPATIENT)
Dept: FAMILY MEDICINE | Facility: CLINIC | Age: 58
End: 2023-08-21
Payer: MEDICARE

## 2023-08-21 VITALS
BODY MASS INDEX: 37.3 KG/M2 | DIASTOLIC BLOOD PRESSURE: 80 MMHG | WEIGHT: 237.63 LBS | OXYGEN SATURATION: 97 % | HEIGHT: 67 IN | TEMPERATURE: 98 F | HEART RATE: 72 BPM | SYSTOLIC BLOOD PRESSURE: 124 MMHG

## 2023-08-21 DIAGNOSIS — F41.9 ANXIETY: Chronic | ICD-10-CM

## 2023-08-21 DIAGNOSIS — F20.9 SCHIZOPHRENIA, UNSPECIFIED TYPE: ICD-10-CM

## 2023-08-21 DIAGNOSIS — E03.9 ACQUIRED HYPOTHYROIDISM: ICD-10-CM

## 2023-08-21 DIAGNOSIS — I10 ESSENTIAL HYPERTENSION: Primary | ICD-10-CM

## 2023-08-21 DIAGNOSIS — E66.01 SEVERE OBESITY (BMI 35.0-39.9) WITH COMORBIDITY: Chronic | ICD-10-CM

## 2023-08-21 DIAGNOSIS — G47.00 INSOMNIA, UNSPECIFIED TYPE: Chronic | ICD-10-CM

## 2023-08-21 PROBLEM — Z12.11 COLON CANCER SCREENING: Status: RESOLVED | Noted: 2021-05-21 | Resolved: 2023-08-21

## 2023-08-21 LAB
ALBUMIN SERPL BCP-MCNC: 3.6 G/DL (ref 3.5–5)
ALBUMIN/GLOB SERPL: 0.9 {RATIO}
ALP SERPL-CCNC: 79 U/L (ref 46–118)
ALT SERPL W P-5'-P-CCNC: 22 U/L (ref 13–56)
ANION GAP SERPL CALCULATED.3IONS-SCNC: 8 MMOL/L (ref 7–16)
AST SERPL W P-5'-P-CCNC: 15 U/L (ref 15–37)
BILIRUB SERPL-MCNC: 0.1 MG/DL (ref ?–1.2)
BUN SERPL-MCNC: 27 MG/DL (ref 7–18)
BUN/CREAT SERPL: 12 (ref 6–20)
CALCIUM SERPL-MCNC: 8.6 MG/DL (ref 8.5–10.1)
CHLORIDE SERPL-SCNC: 106 MMOL/L (ref 98–107)
CHOLEST SERPL-MCNC: 140 MG/DL (ref 0–200)
CHOLEST/HDLC SERPL: 3.5 {RATIO}
CO2 SERPL-SCNC: 27 MMOL/L (ref 21–32)
CREAT SERPL-MCNC: 2.28 MG/DL (ref 0.55–1.02)
EGFR (NO RACE VARIABLE) (RUSH/TITUS): 25 ML/MIN/1.73M2
GLOBULIN SER-MCNC: 4.1 G/DL (ref 2–4)
GLUCOSE SERPL-MCNC: 116 MG/DL (ref 74–106)
HDLC SERPL-MCNC: 40 MG/DL (ref 40–60)
LDLC SERPL CALC-MCNC: 67 MG/DL
LDLC/HDLC SERPL: 1.7 {RATIO}
NONHDLC SERPL-MCNC: 100 MG/DL
POTASSIUM SERPL-SCNC: 3.9 MMOL/L (ref 3.5–5.1)
PROT SERPL-MCNC: 7.7 G/DL (ref 6.4–8.2)
SODIUM SERPL-SCNC: 137 MMOL/L (ref 136–145)
TRIGL SERPL-MCNC: 165 MG/DL (ref 35–150)
VLDLC SERPL-MCNC: 33 MG/DL

## 2023-08-21 PROCEDURE — 99214 PR OFFICE/OUTPT VISIT, EST, LEVL IV, 30-39 MIN: ICD-10-PCS | Mod: ,,, | Performed by: FAMILY MEDICINE

## 2023-08-21 PROCEDURE — 80061 LIPID PANEL: ICD-10-PCS | Mod: ,,, | Performed by: CLINICAL MEDICAL LABORATORY

## 2023-08-21 PROCEDURE — 4010F PR ACE/ARB THEARPY RXD/TAKEN: ICD-10-PCS | Mod: ,,, | Performed by: FAMILY MEDICINE

## 2023-08-21 PROCEDURE — 1160F PR REVIEW ALL MEDS BY PRESCRIBER/CLIN PHARMACIST DOCUMENTED: ICD-10-PCS | Mod: ,,, | Performed by: FAMILY MEDICINE

## 2023-08-21 PROCEDURE — 3061F PR NEG MICROALBUMINURIA RESULT DOCUMENTED/REVIEW: ICD-10-PCS | Mod: ,,, | Performed by: FAMILY MEDICINE

## 2023-08-21 PROCEDURE — 1159F PR MEDICATION LIST DOCUMENTED IN MEDICAL RECORD: ICD-10-PCS | Mod: ,,, | Performed by: FAMILY MEDICINE

## 2023-08-21 PROCEDURE — 99214 OFFICE O/P EST MOD 30 MIN: CPT | Mod: ,,, | Performed by: FAMILY MEDICINE

## 2023-08-21 PROCEDURE — 1159F MED LIST DOCD IN RCRD: CPT | Mod: ,,, | Performed by: FAMILY MEDICINE

## 2023-08-21 PROCEDURE — 3074F SYST BP LT 130 MM HG: CPT | Mod: ,,, | Performed by: FAMILY MEDICINE

## 2023-08-21 PROCEDURE — 3074F PR MOST RECENT SYSTOLIC BLOOD PRESSURE < 130 MM HG: ICD-10-PCS | Mod: ,,, | Performed by: FAMILY MEDICINE

## 2023-08-21 PROCEDURE — 1160F RVW MEDS BY RX/DR IN RCRD: CPT | Mod: ,,, | Performed by: FAMILY MEDICINE

## 2023-08-21 PROCEDURE — 80061 LIPID PANEL: CPT | Mod: ,,, | Performed by: CLINICAL MEDICAL LABORATORY

## 2023-08-21 PROCEDURE — 3066F PR DOCUMENTATION OF TREATMENT FOR NEPHROPATHY: ICD-10-PCS | Mod: ,,, | Performed by: FAMILY MEDICINE

## 2023-08-21 PROCEDURE — 3079F DIAST BP 80-89 MM HG: CPT | Mod: ,,, | Performed by: FAMILY MEDICINE

## 2023-08-21 PROCEDURE — 80053 COMPREHEN METABOLIC PANEL: CPT | Mod: ,,, | Performed by: CLINICAL MEDICAL LABORATORY

## 2023-08-21 PROCEDURE — 3061F NEG MICROALBUMINURIA REV: CPT | Mod: ,,, | Performed by: FAMILY MEDICINE

## 2023-08-21 PROCEDURE — 3079F PR MOST RECENT DIASTOLIC BLOOD PRESSURE 80-89 MM HG: ICD-10-PCS | Mod: ,,, | Performed by: FAMILY MEDICINE

## 2023-08-21 PROCEDURE — 3066F NEPHROPATHY DOC TX: CPT | Mod: ,,, | Performed by: FAMILY MEDICINE

## 2023-08-21 PROCEDURE — 3008F PR BODY MASS INDEX (BMI) DOCUMENTED: ICD-10-PCS | Mod: ,,, | Performed by: FAMILY MEDICINE

## 2023-08-21 PROCEDURE — 4010F ACE/ARB THERAPY RXD/TAKEN: CPT | Mod: ,,, | Performed by: FAMILY MEDICINE

## 2023-08-21 PROCEDURE — 3008F BODY MASS INDEX DOCD: CPT | Mod: ,,, | Performed by: FAMILY MEDICINE

## 2023-08-21 PROCEDURE — 80053 COMPREHENSIVE METABOLIC PANEL: ICD-10-PCS | Mod: ,,, | Performed by: CLINICAL MEDICAL LABORATORY

## 2023-08-21 RX ORDER — LEVOTHYROXINE SODIUM 100 UG/1
100 TABLET ORAL
Qty: 90 TABLET | Refills: 1 | Status: SHIPPED | OUTPATIENT
Start: 2023-08-21 | End: 2023-10-30 | Stop reason: SDUPTHER

## 2023-08-21 RX ORDER — ARIPIPRAZOLE 15 MG/1
15 TABLET ORAL DAILY
Qty: 90 TABLET | Refills: 1 | Status: SHIPPED | OUTPATIENT
Start: 2023-08-21

## 2023-08-21 RX ORDER — METOPROLOL SUCCINATE 25 MG/1
25 TABLET, EXTENDED RELEASE ORAL DAILY
Qty: 90 TABLET | Refills: 1 | Status: SHIPPED | OUTPATIENT
Start: 2023-08-21 | End: 2024-02-21 | Stop reason: SDUPTHER

## 2023-08-21 RX ORDER — AMITRIPTYLINE HYDROCHLORIDE 75 MG/1
75 TABLET ORAL NIGHTLY
Qty: 90 TABLET | Refills: 1 | Status: SHIPPED | OUTPATIENT
Start: 2023-08-21

## 2023-08-21 RX ORDER — HYDROCHLOROTHIAZIDE 25 MG/1
25 TABLET ORAL DAILY
Qty: 90 TABLET | Refills: 1 | Status: SHIPPED | OUTPATIENT
Start: 2023-08-21 | End: 2024-02-21 | Stop reason: SDUPTHER

## 2023-08-21 RX ORDER — HYDRALAZINE HYDROCHLORIDE 25 MG/1
25 TABLET, FILM COATED ORAL EVERY 8 HOURS
Qty: 270 TABLET | Refills: 1 | Status: SHIPPED | OUTPATIENT
Start: 2023-08-21 | End: 2024-02-21 | Stop reason: SDUPTHER

## 2023-08-21 RX ORDER — TRAZODONE HYDROCHLORIDE 300 MG/1
300 TABLET ORAL NIGHTLY
Qty: 90 TABLET | Refills: 1 | Status: SHIPPED | OUTPATIENT
Start: 2023-08-21

## 2023-08-21 RX ORDER — BUSPIRONE HYDROCHLORIDE 30 MG/1
30 TABLET ORAL 2 TIMES DAILY
Qty: 180 TABLET | Refills: 1 | Status: SHIPPED | OUTPATIENT
Start: 2023-08-21

## 2023-08-21 RX ORDER — LISINOPRIL 40 MG/1
40 TABLET ORAL DAILY
Qty: 90 TABLET | Refills: 1 | Status: SHIPPED | OUTPATIENT
Start: 2023-08-21 | End: 2024-02-21 | Stop reason: SDUPTHER

## 2023-08-21 NOTE — PROGRESS NOTES
New Clinic Note    Nikkie Brown is a 58 y.o. female     CC:   Chief Complaint   Patient presents with    Follow-up     6 month check up.     Medication Refill        Subjective    History of Present Illness HPI   Patient is for evaluation of chronic medical problems. Patient needs refills. Leona  is tolerating medications well without side effects.       Current Outpatient Medications:     ergocalciferol (ERGOCALCIFEROL) 50,000 unit Cap, Take 1 capsule (50,000 Units total) by mouth every 7 days. for 12 doses, Disp: 12 capsule, Rfl: 0    amitriptyline (ELAVIL) 75 MG tablet, Take 1 tablet (75 mg total) by mouth nightly., Disp: 90 tablet, Rfl: 1    ARIPiprazole (ABILIFY) 15 MG Tab, Take 1 tablet (15 mg total) by mouth once daily., Disp: 90 tablet, Rfl: 1    busPIRone (BUSPAR) 30 MG Tab, Take 1 tablet (30 mg total) by mouth 2 (two) times daily., Disp: 180 tablet, Rfl: 1    hydrALAZINE (APRESOLINE) 25 MG tablet, Take 1 tablet (25 mg total) by mouth every 8 (eight) hours., Disp: 270 tablet, Rfl: 1    hydroCHLOROthiazide (HYDRODIURIL) 25 MG tablet, Take 1 tablet (25 mg total) by mouth once daily., Disp: 90 tablet, Rfl: 1    hydrOXYzine pamoate (VISTARIL) 50 MG Cap, Take 50 mg by mouth every evening., Disp: , Rfl:     levothyroxine (SYNTHROID) 100 MCG tablet, Take 1 tablet (100 mcg total) by mouth before breakfast., Disp: 90 tablet, Rfl: 1    lisinopriL (PRINIVIL,ZESTRIL) 40 MG tablet, Take 1 tablet (40 mg total) by mouth once daily., Disp: 90 tablet, Rfl: 1    metoprolol succinate (TOPROL-XL) 25 MG 24 hr tablet, Take 1 tablet (25 mg total) by mouth once daily., Disp: 90 tablet, Rfl: 1    traZODone (DESYREL) 300 MG tablet, Take 1 tablet (300 mg total) by mouth nightly., Disp: 90 tablet, Rfl: 1     Past Medical History:   Diagnosis Date    Anxiety     Bipolar disorder     Hypertension     Hypothyroidism     Polyp of descending colon 05/21/2021    Rectal polyp 05/21/2021    Screening for malignant neoplasm of colon  "05/21/2021        Family History   Problem Relation Age of Onset    Hypertension Father     Mental retardation Father     Cancer Father     Hypertension Sister     Mental retardation Sister     Stroke Maternal Grandmother     Heart disease Maternal Grandmother     No Known Problems Brother         Past Surgical History:   Procedure Laterality Date     left wrist repair      ANKLE SURGERY Left 2001    broke ankle has a plate in this ankle    APPLICATION OF EXTERNAL FIXATION DEVICE TO UPPER EXTREMITY Left 03/03/2022    Procedure: APPLICATION, EXTERNAL FIXATION DEVICE, SMALL, HAND OR WRIST;  Surgeon: Vidal Hernandez MD;  Location: [facility];  Service: Orthopedics;  Laterality: Left;    FRACTURE SURGERY      TUBAL LIGATION  1997        Review of Systems   Constitutional:  Negative for fatigue and fever.   HENT:  Negative for ear pain, postnasal drip, rhinorrhea and sinus pressure/congestion.    Respiratory:  Negative for cough and shortness of breath.    Cardiovascular:  Negative for chest pain.   Gastrointestinal:  Negative for abdominal pain, diarrhea, nausea and vomiting.   Genitourinary:  Negative for dysuria.   Neurological:  Negative for headaches.        /80 (BP Location: Right arm, Patient Position: Lying, BP Method: Medium (Manual))   Pulse 72   Temp 97.9 °F (36.6 °C) (Oral)   Ht 5' 7" (1.702 m)   Wt 107.8 kg (237 lb 9.6 oz)   SpO2 97%   BMI 37.21 kg/m²      Physical Exam  HENT:      Head: Normocephalic and atraumatic.   Cardiovascular:      Rate and Rhythm: Normal rate and regular rhythm.   Pulmonary:      Effort: Pulmonary effort is normal.      Breath sounds: Normal breath sounds.   Neurological:      Mental Status: She is alert and oriented to person, place, and time.   Psychiatric:         Mood and Affect: Mood normal.         Behavior: Behavior normal.          Assessment and Plan      ICD-10-CM ICD-9-CM   1. Essential hypertension  I10 401.9   2. Insomnia, unspecified type  G47.00 " 780.52   3. Schizophrenia, unspecified type  F20.9 295.90   4. Anxiety  F41.9 300.00   5. Acquired hypothyroidism  E03.9 244.9   6. Severe obesity (BMI 35.0-39.9) with comorbidity  E66.01 278.01        1. Essential hypertension  The current medical regimen is effective;  continue present plan and medications.  -     hydrALAZINE (APRESOLINE) 25 MG tablet; Take 1 tablet (25 mg total) by mouth every 8 (eight) hours.  Dispense: 270 tablet; Refill: 1  -     hydroCHLOROthiazide (HYDRODIURIL) 25 MG tablet; Take 1 tablet (25 mg total) by mouth once daily.  Dispense: 90 tablet; Refill: 1  -     lisinopriL (PRINIVIL,ZESTRIL) 40 MG tablet; Take 1 tablet (40 mg total) by mouth once daily.  Dispense: 90 tablet; Refill: 1  -     metoprolol succinate (TOPROL-XL) 25 MG 24 hr tablet; Take 1 tablet (25 mg total) by mouth once daily.  Dispense: 90 tablet; Refill: 1  -     Comprehensive Metabolic Panel; Future; Expected date: 08/21/2023  -     Lipid Panel; Future; Expected date: 08/21/2023    2. Insomnia, unspecified type  The current medical regimen is effective;  continue present plan and medications.  -     amitriptyline (ELAVIL) 75 MG tablet; Take 1 tablet (75 mg total) by mouth nightly.  Dispense: 90 tablet; Refill: 1  -     traZODone (DESYREL) 300 MG tablet; Take 1 tablet (300 mg total) by mouth nightly.  Dispense: 90 tablet; Refill: 1    3. Schizophrenia, unspecified type  The current medical regimen is effective;  continue present plan and medications.  -     ARIPiprazole (ABILIFY) 15 MG Tab; Take 1 tablet (15 mg total) by mouth once daily.  Dispense: 90 tablet; Refill: 1    4. Anxiety  The current medical regimen is effective;  continue present plan and medications.  -     busPIRone (BUSPAR) 30 MG Tab; Take 1 tablet (30 mg total) by mouth 2 (two) times daily.  Dispense: 180 tablet; Refill: 1    5. Acquired hypothyroidism  The current medical regimen is effective;  continue present plan and medications.  -     levothyroxine  (SYNTHROID) 100 MCG tablet; Take 1 tablet (100 mcg total) by mouth before breakfast.  Dispense: 90 tablet; Refill: 1    6. Severe obesity (BMI 35.0-39.9) with comorbidity  Diet and educational handouts given.        Follow up in about 6 months (around 2/21/2024).

## 2023-08-22 ENCOUNTER — HOSPITAL ENCOUNTER (OUTPATIENT)
Dept: RADIOLOGY | Facility: HOSPITAL | Age: 58
Discharge: HOME | End: 2023-08-22
Attending: INTERNAL MEDICINE
Payer: MEDICARE

## 2023-08-22 DIAGNOSIS — R94.4 KIDNEY FUNCTION TEST ABNORMAL: Primary | ICD-10-CM

## 2023-08-22 DIAGNOSIS — N18.32 STAGE 3B CHRONIC KIDNEY DISEASE: ICD-10-CM

## 2023-08-22 PROCEDURE — 76770 US EXAM ABDO BACK WALL COMP: CPT | Mod: 26,,, | Performed by: RADIOLOGY

## 2023-08-22 PROCEDURE — 76770 US KIDNEY: ICD-10-PCS | Mod: 26,,, | Performed by: RADIOLOGY

## 2023-08-22 PROCEDURE — 76770 US EXAM ABDO BACK WALL COMP: CPT | Mod: TC

## 2023-08-28 ENCOUNTER — TELEPHONE (OUTPATIENT)
Dept: NEPHROLOGY | Facility: CLINIC | Age: 58
End: 2023-08-28
Payer: MEDICARE

## 2023-08-28 NOTE — TELEPHONE ENCOUNTER
----- Message from Gena Bullard sent at 8/28/2023  8:58 AM CDT -----  Regarding: results  Patient calling to get results, call back number is [phone]

## 2023-09-07 ENCOUNTER — PATIENT OUTREACH (OUTPATIENT)
Dept: ADMINISTRATIVE | Facility: HOSPITAL | Age: 58
End: 2023-09-07

## 2023-09-07 NOTE — PROGRESS NOTES
[facility] Chart Audit for the following: Blood Pressure Management.    Most recent Blood Pressure recorded is 124/80 on 08/21/23 PCP Visit.

## 2023-09-20 ENCOUNTER — TELEPHONE (OUTPATIENT)
Dept: FAMILY MEDICINE | Facility: CLINIC | Age: 58
End: 2023-09-20
Payer: MEDICARE

## 2023-09-20 NOTE — TELEPHONE ENCOUNTER
"Patient called wanting to make an appointment with her PCP for referral to Pain Treatment to get a medical marjuana card for what she stated was "chronic" pain. Dr Vann will be back after 10/02/2023. Patient stated she would call back and make an appointment then.   "

## 2023-10-13 ENCOUNTER — OFFICE VISIT (OUTPATIENT)
Dept: FAMILY MEDICINE | Facility: CLINIC | Age: 58
End: 2023-10-13
Payer: MEDICARE

## 2023-10-13 ENCOUNTER — HOSPITAL ENCOUNTER (OUTPATIENT)
Dept: RADIOLOGY | Facility: HOSPITAL | Age: 58
Discharge: HOME | End: 2023-10-13
Attending: FAMILY MEDICINE
Payer: MEDICARE

## 2023-10-13 VITALS
SYSTOLIC BLOOD PRESSURE: 124 MMHG | HEIGHT: 67 IN | WEIGHT: 227 LBS | RESPIRATION RATE: 18 BRPM | OXYGEN SATURATION: 98 % | TEMPERATURE: 98 F | DIASTOLIC BLOOD PRESSURE: 78 MMHG | HEART RATE: 68 BPM | BODY MASS INDEX: 35.63 KG/M2

## 2023-10-13 DIAGNOSIS — G89.29 CHRONIC PAIN OF LEFT ANKLE: Primary | ICD-10-CM

## 2023-10-13 DIAGNOSIS — N18.32 STAGE 3B CHRONIC KIDNEY DISEASE: Chronic | ICD-10-CM

## 2023-10-13 DIAGNOSIS — M25.572 CHRONIC PAIN OF LEFT ANKLE: Primary | ICD-10-CM

## 2023-10-13 DIAGNOSIS — G89.29 CHRONIC PAIN OF LEFT ANKLE: ICD-10-CM

## 2023-10-13 DIAGNOSIS — M25.572 CHRONIC PAIN OF LEFT ANKLE: ICD-10-CM

## 2023-10-13 DIAGNOSIS — R19.7 DIARRHEA, UNSPECIFIED TYPE: ICD-10-CM

## 2023-10-13 PROCEDURE — 1159F MED LIST DOCD IN RCRD: CPT | Mod: ,,, | Performed by: FAMILY MEDICINE

## 2023-10-13 PROCEDURE — 1159F PR MEDICATION LIST DOCUMENTED IN MEDICAL RECORD: ICD-10-PCS | Mod: ,,, | Performed by: FAMILY MEDICINE

## 2023-10-13 PROCEDURE — 1160F PR REVIEW ALL MEDS BY PRESCRIBER/CLIN PHARMACIST DOCUMENTED: ICD-10-PCS | Mod: ,,, | Performed by: FAMILY MEDICINE

## 2023-10-13 PROCEDURE — 3066F NEPHROPATHY DOC TX: CPT | Mod: ,,, | Performed by: FAMILY MEDICINE

## 2023-10-13 PROCEDURE — 4010F PR ACE/ARB THEARPY RXD/TAKEN: ICD-10-PCS | Mod: ,,, | Performed by: FAMILY MEDICINE

## 2023-10-13 PROCEDURE — 1160F RVW MEDS BY RX/DR IN RCRD: CPT | Mod: ,,, | Performed by: FAMILY MEDICINE

## 2023-10-13 PROCEDURE — 3066F PR DOCUMENTATION OF TREATMENT FOR NEPHROPATHY: ICD-10-PCS | Mod: ,,, | Performed by: FAMILY MEDICINE

## 2023-10-13 PROCEDURE — 3074F SYST BP LT 130 MM HG: CPT | Mod: ,,, | Performed by: FAMILY MEDICINE

## 2023-10-13 PROCEDURE — 3008F BODY MASS INDEX DOCD: CPT | Mod: ,,, | Performed by: FAMILY MEDICINE

## 2023-10-13 PROCEDURE — 3078F PR MOST RECENT DIASTOLIC BLOOD PRESSURE < 80 MM HG: ICD-10-PCS | Mod: ,,, | Performed by: FAMILY MEDICINE

## 2023-10-13 PROCEDURE — 4010F ACE/ARB THERAPY RXD/TAKEN: CPT | Mod: ,,, | Performed by: FAMILY MEDICINE

## 2023-10-13 PROCEDURE — 99213 PR OFFICE/OUTPT VISIT, EST, LEVL III, 20-29 MIN: ICD-10-PCS | Mod: ,,, | Performed by: FAMILY MEDICINE

## 2023-10-13 PROCEDURE — 3074F PR MOST RECENT SYSTOLIC BLOOD PRESSURE < 130 MM HG: ICD-10-PCS | Mod: ,,, | Performed by: FAMILY MEDICINE

## 2023-10-13 PROCEDURE — 3061F NEG MICROALBUMINURIA REV: CPT | Mod: ,,, | Performed by: FAMILY MEDICINE

## 2023-10-13 PROCEDURE — 3008F PR BODY MASS INDEX (BMI) DOCUMENTED: ICD-10-PCS | Mod: ,,, | Performed by: FAMILY MEDICINE

## 2023-10-13 PROCEDURE — 73610 X-RAY EXAM OF ANKLE: CPT | Mod: TC,PN,LT

## 2023-10-13 PROCEDURE — 99213 OFFICE O/P EST LOW 20 MIN: CPT | Mod: ,,, | Performed by: FAMILY MEDICINE

## 2023-10-13 PROCEDURE — 3061F PR NEG MICROALBUMINURIA RESULT DOCUMENTED/REVIEW: ICD-10-PCS | Mod: ,,, | Performed by: FAMILY MEDICINE

## 2023-10-13 PROCEDURE — 3078F DIAST BP <80 MM HG: CPT | Mod: ,,, | Performed by: FAMILY MEDICINE

## 2023-10-13 RX ORDER — BENZTROPINE MESYLATE 1 MG/1
1 TABLET ORAL NIGHTLY
COMMUNITY
End: 2024-02-21

## 2023-10-13 RX ORDER — FERROUS SULFATE 325(65) MG
1 TABLET ORAL DAILY
COMMUNITY
End: 2024-02-21

## 2023-10-13 RX ORDER — FLUOXETINE HYDROCHLORIDE 40 MG/1
2 CAPSULE ORAL DAILY
COMMUNITY
End: 2024-02-21

## 2023-10-13 RX ORDER — QUETIAPINE FUMARATE 100 MG/1
1 TABLET, FILM COATED ORAL NIGHTLY
COMMUNITY
End: 2024-02-21

## 2023-10-13 RX ORDER — LOSARTAN POTASSIUM 50 MG/1
1 TABLET ORAL DAILY
COMMUNITY
End: 2024-02-21

## 2023-10-13 RX ORDER — CLORAZEPATE DIPOTASSIUM 7.5 MG/1
TABLET ORAL
COMMUNITY
End: 2024-02-21

## 2023-10-13 RX ORDER — NAPROXEN 250 MG/1
TABLET ORAL
COMMUNITY
End: 2023-10-13

## 2023-10-13 NOTE — PROGRESS NOTES
New Clinic Note    Nikkie Brown is a 58 y.o. female     CC:   Chief Complaint   Patient presents with    Annual Exam     Patient stated she is here for 6 month general health evaluation, to discuss getting a medical marijuana card/referral to Pain Treatment at [facility] to get a card for chronic left ankle pain. Patient sees Nephrologist HELEN Mckeon with [facility] for Stage 3 chronic kidney disease. Stated she is off all NSAIDs and her left ankle hurts worse. Does not work due to h/o schizoprenia, Bi[employer] and anxiety and goes to [employer] for control. Stated she had HTN since first pregnancy at age of 20. Request a PAP smear today.     Hypertension    Diarrhea     Complains of 10 days of diarrhea.    Ankle Pain     Chronic left ankle pain post fracture and repair from the past.     Flu Vaccine     Patient would like the flu vaccine during this visit.         Subjective    History of Present Illness   Patient complains of left ankle pain for several years. She was on naproxen which helped with the pain. However, she was taken off by nephrology. She reports that she is now having trouble walking on it. She had surgery on the ankle in 1996. She wants to see about going to pain treatment. Patient complains of diarrhea for 2 months. She reports loose stools at least 4-5 times a day. She has not taken anything for this. She denies abdominal pain, nausea or vomiting.     Current Outpatient Medications:     amitriptyline (ELAVIL) 75 MG tablet, Take 1 tablet (75 mg total) by mouth nightly., Disp: 90 tablet, Rfl: 1    ARIPiprazole (ABILIFY) 15 MG Tab, Take 1 tablet (15 mg total) by mouth once daily., Disp: 90 tablet, Rfl: 1    benztropine (COGENTIN) 1 MG tablet, Take 1 tablet by mouth every evening., Disp: , Rfl:     busPIRone (BUSPAR) 30 MG Tab, Take 1 tablet (30 mg total) by mouth 2 (two) times daily., Disp: 180 tablet, Rfl: 1    ergocalciferol (ERGOCALCIFEROL) 50,000 unit Cap, Take 1 capsule (50,000 Units total) by mouth every 7 days.  for 12 doses, Disp: 12 capsule, Rfl: 0    FLUoxetine 40 MG capsule, Take 2 tablets by mouth once daily., Disp: , Rfl:     hydrALAZINE (APRESOLINE) 25 MG tablet, Take 1 tablet (25 mg total) by mouth every 8 (eight) hours., Disp: 270 tablet, Rfl: 1    hydroCHLOROthiazide (HYDRODIURIL) 25 MG tablet, Take 1 tablet (25 mg total) by mouth once daily., Disp: 90 tablet, Rfl: 1    hydrOXYzine pamoate (VISTARIL) 50 MG Cap, Take 50 mg by mouth every evening., Disp: , Rfl:     levothyroxine (SYNTHROID) 100 MCG tablet, Take 1 tablet (100 mcg total) by mouth before breakfast., Disp: 90 tablet, Rfl: 1    lisinopriL (PRINIVIL,ZESTRIL) 40 MG tablet, Take 1 tablet (40 mg total) by mouth once daily., Disp: 90 tablet, Rfl: 1    losartan (COZAAR) 50 MG tablet, Take 1 tablet by mouth once daily., Disp: , Rfl:     metoprolol succinate (TOPROL-XL) 25 MG 24 hr tablet, Take 1 tablet (25 mg total) by mouth once daily., Disp: 90 tablet, Rfl: 1    traZODone (DESYREL) 300 MG tablet, Take 1 tablet (300 mg total) by mouth nightly., Disp: 90 tablet, Rfl: 1    clorazepate (TRANXENE) 7.5 MG Tab, , Disp: , Rfl:     ferrous sulfate (FEOSOL) 325 mg (65 mg iron) Tab tablet, Take 1 tablet by mouth once daily., Disp: , Rfl:     QUEtiapine (SEROQUEL) 100 MG Tab, Take 1 tablet by mouth every evening., Disp: , Rfl:      Past Medical History:   Diagnosis Date    Anxiety     Bipolar disorder     Chronic kidney disease, unspecified     Fracture of left ankle, sequela     Hypertension     Hypothyroidism     Polyp of descending colon 05/21/2021    Rectal polyp 05/21/2021    Schizophrenia, unspecified     Screening for malignant neoplasm of colon 05/21/2021        Family History   Problem Relation Age of Onset    Hypertension Father     Mental retardation Father     Cancer Father     Hypertension Sister     Mental retardation Sister     Stroke Maternal Grandmother     Heart disease Maternal Grandmother     No Known Problems Brother         Past Surgical History:  "  Procedure Laterality Date     left wrist repair      ANKLE SURGERY Left 2001    broke ankle has a plate in this ankle    APPLICATION OF EXTERNAL FIXATION DEVICE TO UPPER EXTREMITY Left 03/03/2022    Procedure: APPLICATION, EXTERNAL FIXATION DEVICE, SMALL, HAND OR WRIST;  Surgeon: Vidal Hernandez MD;  Location: [facility];  Service: Orthopedics;  Laterality: Left;    FRACTURE SURGERY      TUBAL LIGATION  1997        Review of Systems   Constitutional:  Negative for activity change, fatigue, fever and unexpected weight change.   HENT:  Negative for ear pain, hearing loss, postnasal drip, rhinorrhea, sinus pressure/congestion and trouble swallowing.    Eyes:  Negative for discharge and visual disturbance.   Respiratory:  Negative for cough, chest tightness, shortness of breath and wheezing.    Cardiovascular:  Negative for chest pain and palpitations.   Gastrointestinal:  Positive for diarrhea. Negative for abdominal pain, blood in stool, constipation, nausea and vomiting.   Endocrine: Positive for polyuria. Negative for polydipsia.   Genitourinary:  Negative for difficulty urinating, dysuria, hematuria and menstrual problem.   Musculoskeletal:  Negative for arthralgias, joint swelling and neck pain.   Neurological:  Negative for weakness and headaches.   Psychiatric/Behavioral:  Negative for confusion and dysphoric mood.         /78 (BP Location: Left arm, Patient Position: Sitting, BP Method: Large (Automatic))   Pulse 68   Temp 98 °F (36.7 °C) (Oral)   Resp 18   Ht 5' 7" (1.702 m)   Wt 103 kg (227 lb)   SpO2 98%   BMI 35.55 kg/m²      Physical Exam  HENT:      Head: Normocephalic and atraumatic.   Cardiovascular:      Rate and Rhythm: Normal rate and regular rhythm.   Pulmonary:      Effort: Pulmonary effort is normal.      Breath sounds: Normal breath sounds.   Neurological:      Mental Status: She is alert and oriented to person, place, and time.   Psychiatric:         Mood and Affect: Mood " normal.         Behavior: Behavior normal.          Assessment and Plan      ICD-10-CM ICD-9-CM   1. Chronic pain of left ankle  M25.572 719.47    G89.29 338.29   2. Stage 3b chronic kidney disease  N18.32 585.3   3. Diarrhea, unspecified type  R19.7 787.91        1. Chronic pain of left ankle  Not controlled. Refer to pain treatment.   -     X-Ray Ankle Complete 3 View Left; Future; Expected date: 10/13/2023  -     Ambulatory referral/consult to Pain Clinic; Future; Expected date: 10/20/2023    2. Stage 3b chronic kidney disease  Stable. Continue to monitor.  -     Ambulatory referral/consult to Pain Clinic; Future; Expected date: 10/20/2023    3. Diarrhea, unspecified type  Sent patient home with collection kit. Will order stool studies when it is returned.         Follow up if symptoms worsen or fail to improve.

## 2023-10-30 ENCOUNTER — OFFICE VISIT (OUTPATIENT)
Dept: FAMILY MEDICINE | Facility: CLINIC | Age: 58
End: 2023-10-30
Payer: MEDICARE

## 2023-10-30 VITALS
BODY MASS INDEX: 35.63 KG/M2 | RESPIRATION RATE: 18 BRPM | HEIGHT: 67 IN | OXYGEN SATURATION: 99 % | TEMPERATURE: 98 F | DIASTOLIC BLOOD PRESSURE: 78 MMHG | SYSTOLIC BLOOD PRESSURE: 134 MMHG | WEIGHT: 227 LBS

## 2023-10-30 DIAGNOSIS — E03.9 ACQUIRED HYPOTHYROIDISM: ICD-10-CM

## 2023-10-30 DIAGNOSIS — Z12.4 ENCOUNTER FOR SCREENING FOR MALIGNANT NEOPLASM OF CERVIX: Primary | ICD-10-CM

## 2023-10-30 PROCEDURE — 99212 PR OFFICE/OUTPT VISIT, EST, LEVL II, 10-19 MIN: ICD-10-PCS | Mod: ,,, | Performed by: FAMILY MEDICINE

## 2023-10-30 PROCEDURE — 88142 CYTOPATH C/V THIN LAYER: CPT | Mod: TC,GCY | Performed by: FAMILY MEDICINE

## 2023-10-30 PROCEDURE — 4010F PR ACE/ARB THEARPY RXD/TAKEN: ICD-10-PCS | Mod: ,,, | Performed by: FAMILY MEDICINE

## 2023-10-30 PROCEDURE — 3008F PR BODY MASS INDEX (BMI) DOCUMENTED: ICD-10-PCS | Mod: ,,, | Performed by: FAMILY MEDICINE

## 2023-10-30 PROCEDURE — 3061F PR NEG MICROALBUMINURIA RESULT DOCUMENTED/REVIEW: ICD-10-PCS | Mod: ,,, | Performed by: FAMILY MEDICINE

## 2023-10-30 PROCEDURE — 3066F PR DOCUMENTATION OF TREATMENT FOR NEPHROPATHY: ICD-10-PCS | Mod: ,,, | Performed by: FAMILY MEDICINE

## 2023-10-30 PROCEDURE — 1159F MED LIST DOCD IN RCRD: CPT | Mod: ,,, | Performed by: FAMILY MEDICINE

## 2023-10-30 PROCEDURE — 1159F PR MEDICATION LIST DOCUMENTED IN MEDICAL RECORD: ICD-10-PCS | Mod: ,,, | Performed by: FAMILY MEDICINE

## 2023-10-30 PROCEDURE — 99212 OFFICE O/P EST SF 10 MIN: CPT | Mod: ,,, | Performed by: FAMILY MEDICINE

## 2023-10-30 PROCEDURE — 3061F NEG MICROALBUMINURIA REV: CPT | Mod: ,,, | Performed by: FAMILY MEDICINE

## 2023-10-30 PROCEDURE — 1160F RVW MEDS BY RX/DR IN RCRD: CPT | Mod: ,,, | Performed by: FAMILY MEDICINE

## 2023-10-30 PROCEDURE — 1160F PR REVIEW ALL MEDS BY PRESCRIBER/CLIN PHARMACIST DOCUMENTED: ICD-10-PCS | Mod: ,,, | Performed by: FAMILY MEDICINE

## 2023-10-30 PROCEDURE — 3078F DIAST BP <80 MM HG: CPT | Mod: ,,, | Performed by: FAMILY MEDICINE

## 2023-10-30 PROCEDURE — 4010F ACE/ARB THERAPY RXD/TAKEN: CPT | Mod: ,,, | Performed by: FAMILY MEDICINE

## 2023-10-30 PROCEDURE — 3008F BODY MASS INDEX DOCD: CPT | Mod: ,,, | Performed by: FAMILY MEDICINE

## 2023-10-30 PROCEDURE — 3075F PR MOST RECENT SYSTOLIC BLOOD PRESS GE 130-139MM HG: ICD-10-PCS | Mod: ,,, | Performed by: FAMILY MEDICINE

## 2023-10-30 PROCEDURE — 3075F SYST BP GE 130 - 139MM HG: CPT | Mod: ,,, | Performed by: FAMILY MEDICINE

## 2023-10-30 PROCEDURE — 3078F PR MOST RECENT DIASTOLIC BLOOD PRESSURE < 80 MM HG: ICD-10-PCS | Mod: ,,, | Performed by: FAMILY MEDICINE

## 2023-10-30 PROCEDURE — 3066F NEPHROPATHY DOC TX: CPT | Mod: ,,, | Performed by: FAMILY MEDICINE

## 2023-10-30 RX ORDER — LEVOTHYROXINE SODIUM 100 UG/1
100 TABLET ORAL
Qty: 90 TABLET | Refills: 1 | Status: SHIPPED | OUTPATIENT
Start: 2023-10-30 | End: 2024-02-21 | Stop reason: SDUPTHER

## 2023-10-30 NOTE — TELEPHONE ENCOUNTER
Patient called back and said she we asked her if she needed any refills. She got home and checked and she does nee her synthroid.

## 2023-10-30 NOTE — PROGRESS NOTES
New Clinic Note    Nikkie Brown is a 58 y.o. female     CC:   Chief Complaint   Patient presents with    Gynecologic Exam     Patient stated she is here for her yearly PAP smear.         Subjective    History of Present Illness    Patient is here for a PAP smear. She denies abdominal pain or vaginal discharge.    Current Outpatient Medications:     amitriptyline (ELAVIL) 75 MG tablet, Take 1 tablet (75 mg total) by mouth nightly., Disp: 90 tablet, Rfl: 1    ARIPiprazole (ABILIFY) 15 MG Tab, Take 1 tablet (15 mg total) by mouth once daily., Disp: 90 tablet, Rfl: 1    benztropine (COGENTIN) 1 MG tablet, Take 1 tablet by mouth every evening., Disp: , Rfl:     busPIRone (BUSPAR) 30 MG Tab, Take 1 tablet (30 mg total) by mouth 2 (two) times daily., Disp: 180 tablet, Rfl: 1    clorazepate (TRANXENE) 7.5 MG Tab, , Disp: , Rfl:     ergocalciferol (ERGOCALCIFEROL) 50,000 unit Cap, Take 1 capsule (50,000 Units total) by mouth every 7 days. for 12 doses, Disp: 12 capsule, Rfl: 0    ferrous sulfate (FEOSOL) 325 mg (65 mg iron) Tab tablet, Take 1 tablet by mouth once daily., Disp: , Rfl:     FLUoxetine 40 MG capsule, Take 2 tablets by mouth once daily., Disp: , Rfl:     hydrALAZINE (APRESOLINE) 25 MG tablet, Take 1 tablet (25 mg total) by mouth every 8 (eight) hours., Disp: 270 tablet, Rfl: 1    hydroCHLOROthiazide (HYDRODIURIL) 25 MG tablet, Take 1 tablet (25 mg total) by mouth once daily., Disp: 90 tablet, Rfl: 1    hydrOXYzine pamoate (VISTARIL) 50 MG Cap, Take 50 mg by mouth every evening., Disp: , Rfl:     levothyroxine (SYNTHROID) 100 MCG tablet, Take 1 tablet (100 mcg total) by mouth before breakfast., Disp: 90 tablet, Rfl: 1    lisinopriL (PRINIVIL,ZESTRIL) 40 MG tablet, Take 1 tablet (40 mg total) by mouth once daily., Disp: 90 tablet, Rfl: 1    losartan (COZAAR) 50 MG tablet, Take 1 tablet by mouth once daily., Disp: , Rfl:     metoprolol succinate (TOPROL-XL) 25 MG 24 hr tablet, Take 1 tablet (25 mg total) by  mouth once daily., Disp: 90 tablet, Rfl: 1    QUEtiapine (SEROQUEL) 100 MG Tab, Take 1 tablet by mouth every evening., Disp: , Rfl:     traZODone (DESYREL) 300 MG tablet, Take 1 tablet (300 mg total) by mouth nightly., Disp: 90 tablet, Rfl: 1     Past Medical History:   Diagnosis Date    Anxiety     Bipolar disorder     Chronic kidney disease, unspecified     Fracture of left ankle, sequela     Hypertension     Hypothyroidism     Polyp of descending colon 05/21/2021    Rectal polyp 05/21/2021    Schizophrenia, unspecified     Screening for malignant neoplasm of colon 05/21/2021        Family History   Problem Relation Age of Onset    Hypertension Father     Mental retardation Father     Cancer Father     Hypertension Sister     Mental retardation Sister     Stroke Maternal Grandmother     Heart disease Maternal Grandmother     No Known Problems Brother         Past Surgical History:   Procedure Laterality Date     left wrist repair      ANKLE SURGERY Left 2001    broke ankle has a plate in this ankle    APPLICATION OF EXTERNAL FIXATION DEVICE TO UPPER EXTREMITY Left 03/03/2022    Procedure: APPLICATION, EXTERNAL FIXATION DEVICE, SMALL, HAND OR WRIST;  Surgeon: Vidal Hernandez MD;  Location: [facility];  Service: Orthopedics;  Laterality: Left;    FRACTURE SURGERY      TUBAL LIGATION  1997        Social History     Socioeconomic History    Marital status:     Number of children: 5   Occupational History    Occupation: not employed   Tobacco Use    Smoking status: Every Day     Current packs/day: 1.00     Average packs/day: 1 pack/day for 25.7 years (25.7 ttl pk-yrs)     Types: Cigarettes     Start date: 2/8/1998     Passive exposure: Current    Smokeless tobacco: Never   Substance and Sexual Activity    Alcohol use: Not Currently    Drug use: Yes     Frequency: 3.0 times per week     Types: Marijuana     Comment: patient states she smokes 3 blunts a week.    Sexual activity: Yes     Partners: Male  "  Social History Narrative    Lives alone.        Review of Systems   Constitutional:  Negative for fatigue and fever.   HENT:  Negative for ear pain, postnasal drip, rhinorrhea and sinus pressure/congestion.    Respiratory:  Negative for cough and shortness of breath.    Cardiovascular:  Negative for chest pain.   Gastrointestinal:  Negative for abdominal pain, diarrhea, nausea and vomiting.   Genitourinary:  Negative for dyspareunia, dysuria, genital sores, pelvic pain, vaginal bleeding and vaginal discharge.   Neurological:  Negative for headaches.        /78 (BP Location: Left arm, Patient Position: Sitting, BP Method: Large (Automatic))   Temp 98.4 °F (36.9 °C) (Oral)   Resp 18   Ht 5' 7" (1.702 m)   Wt 103 kg (227 lb)   SpO2 99%   BMI 35.55 kg/m²      Physical Exam  Exam conducted with a chaperone present.   HENT:      Head: Normocephalic and atraumatic.   Cardiovascular:      Rate and Rhythm: Normal rate and regular rhythm.   Pulmonary:      Effort: Pulmonary effort is normal.      Breath sounds: Normal breath sounds.   Chest:      Chest wall: No mass or tenderness.   Breasts:     Right: Normal.      Left: Normal.   Genitourinary:     General: Normal vulva.      Vagina: Normal.      Cervix: Normal.      Uterus: Normal.       Adnexa: Right adnexa normal and left adnexa normal.   Lymphadenopathy:      Upper Body:      Right upper body: No supraclavicular, axillary or pectoral adenopathy.      Left upper body: No supraclavicular, axillary or pectoral adenopathy.   Neurological:      Mental Status: She is alert and oriented to person, place, and time.   Psychiatric:         Mood and Affect: Mood normal.         Behavior: Behavior normal.          Assessment and Plan      ICD-10-CM ICD-9-CM   1. Encounter for screening for malignant neoplasm of cervix  Z12.4 V76.2        1. Encounter for screening for malignant neoplasm of cervix  -     ThinPrep Pap Test        Follow up if symptoms worsen or fail to " improve.       This information was created by a scribe under my supervision and in my presence. I have reviewed and agree with the scribe's documentation.

## 2023-11-01 LAB
GH SERPL-MCNC: NORMAL NG/ML
INSULIN SERPL-ACNC: NORMAL U[IU]/ML
LAB AP CLINICAL INFORMATION: NORMAL
LAB AP GYN INTERPRETATION: NEGATIVE
LAB AP PAP DISCLAIMER COMMENTS: NORMAL
RENIN PLAS-CCNC: NORMAL NG/ML/H

## 2023-12-27 ENCOUNTER — OFFICE VISIT (OUTPATIENT)
Dept: FAMILY MEDICINE | Facility: CLINIC | Age: 58
End: 2023-12-27
Payer: MEDICARE

## 2023-12-27 VITALS
DIASTOLIC BLOOD PRESSURE: 85 MMHG | SYSTOLIC BLOOD PRESSURE: 129 MMHG | HEIGHT: 67 IN | HEART RATE: 66 BPM | TEMPERATURE: 98 F | BODY MASS INDEX: 34.37 KG/M2 | WEIGHT: 219 LBS | OXYGEN SATURATION: 97 %

## 2023-12-27 DIAGNOSIS — R19.7 DIARRHEA OF PRESUMED INFECTIOUS ORIGIN: Primary | ICD-10-CM

## 2023-12-27 DIAGNOSIS — Z20.828 EXPOSURE TO INFLUENZA: ICD-10-CM

## 2023-12-27 DIAGNOSIS — B34.9 VIRAL ILLNESS: ICD-10-CM

## 2023-12-27 PROBLEM — M10.9 GOUT: Status: ACTIVE | Noted: 2020-06-03

## 2023-12-27 LAB
CTP QC/QA: YES
CTP QC/QA: YES
FLUAV AG NPH QL: NEGATIVE
FLUBV AG NPH QL: NEGATIVE
SARS-COV-2 RDRP RESP QL NAA+PROBE: NEGATIVE

## 2023-12-27 PROCEDURE — 99214 OFFICE O/P EST MOD 30 MIN: CPT | Mod: ,,, | Performed by: NURSE PRACTITIONER

## 2023-12-27 PROCEDURE — 4010F PR ACE/ARB THEARPY RXD/TAKEN: ICD-10-PCS | Mod: ,,, | Performed by: NURSE PRACTITIONER

## 2023-12-27 PROCEDURE — 1159F MED LIST DOCD IN RCRD: CPT | Mod: ,,, | Performed by: NURSE PRACTITIONER

## 2023-12-27 PROCEDURE — 99214 PR OFFICE/OUTPT VISIT, EST, LEVL IV, 30-39 MIN: ICD-10-PCS | Mod: ,,, | Performed by: NURSE PRACTITIONER

## 2023-12-27 PROCEDURE — 87635 SARS-COV-2 COVID-19 AMP PRB: CPT | Mod: ,,, | Performed by: NURSE PRACTITIONER

## 2023-12-27 PROCEDURE — 87635: ICD-10-PCS | Mod: ,,, | Performed by: NURSE PRACTITIONER

## 2023-12-27 PROCEDURE — 3061F NEG MICROALBUMINURIA REV: CPT | Mod: ,,, | Performed by: NURSE PRACTITIONER

## 2023-12-27 PROCEDURE — 1159F PR MEDICATION LIST DOCUMENTED IN MEDICAL RECORD: ICD-10-PCS | Mod: ,,, | Performed by: NURSE PRACTITIONER

## 2023-12-27 PROCEDURE — 3066F NEPHROPATHY DOC TX: CPT | Mod: ,,, | Performed by: NURSE PRACTITIONER

## 2023-12-27 PROCEDURE — 3079F PR MOST RECENT DIASTOLIC BLOOD PRESSURE 80-89 MM HG: ICD-10-PCS | Mod: ,,, | Performed by: NURSE PRACTITIONER

## 2023-12-27 PROCEDURE — 3074F SYST BP LT 130 MM HG: CPT | Mod: ,,, | Performed by: NURSE PRACTITIONER

## 2023-12-27 PROCEDURE — 3074F PR MOST RECENT SYSTOLIC BLOOD PRESSURE < 130 MM HG: ICD-10-PCS | Mod: ,,, | Performed by: NURSE PRACTITIONER

## 2023-12-27 PROCEDURE — 3079F DIAST BP 80-89 MM HG: CPT | Mod: ,,, | Performed by: NURSE PRACTITIONER

## 2023-12-27 PROCEDURE — 1160F PR REVIEW ALL MEDS BY PRESCRIBER/CLIN PHARMACIST DOCUMENTED: ICD-10-PCS | Mod: ,,, | Performed by: NURSE PRACTITIONER

## 2023-12-27 PROCEDURE — 1160F RVW MEDS BY RX/DR IN RCRD: CPT | Mod: ,,, | Performed by: NURSE PRACTITIONER

## 2023-12-27 PROCEDURE — 4010F ACE/ARB THERAPY RXD/TAKEN: CPT | Mod: ,,, | Performed by: NURSE PRACTITIONER

## 2023-12-27 PROCEDURE — 87804 INFLUENZA ASSAY W/OPTIC: CPT | Mod: 59,QW,, | Performed by: NURSE PRACTITIONER

## 2023-12-27 PROCEDURE — 87804 POCT INFLUENZA A/B: ICD-10-PCS | Mod: 59,QW,, | Performed by: NURSE PRACTITIONER

## 2023-12-27 PROCEDURE — 3008F PR BODY MASS INDEX (BMI) DOCUMENTED: ICD-10-PCS | Mod: ,,, | Performed by: NURSE PRACTITIONER

## 2023-12-27 PROCEDURE — 3066F PR DOCUMENTATION OF TREATMENT FOR NEPHROPATHY: ICD-10-PCS | Mod: ,,, | Performed by: NURSE PRACTITIONER

## 2023-12-27 PROCEDURE — 3061F PR NEG MICROALBUMINURIA RESULT DOCUMENTED/REVIEW: ICD-10-PCS | Mod: ,,, | Performed by: NURSE PRACTITIONER

## 2023-12-27 PROCEDURE — 3008F BODY MASS INDEX DOCD: CPT | Mod: ,,, | Performed by: NURSE PRACTITIONER

## 2023-12-27 RX ORDER — LOPERAMIDE HYDROCHLORIDE 2 MG/1
2 CAPSULE ORAL 4 TIMES DAILY PRN
Qty: 24 CAPSULE | Refills: 0 | Status: SHIPPED | OUTPATIENT
Start: 2023-12-27 | End: 2024-02-21

## 2023-12-27 NOTE — PROGRESS NOTES
FEDE Roberts    [address] [place] [address]     PATIENT NAME: Nikkie Brown  : 1965  DATE: 23  MRN: [id number]      Billing Provider: FEDE Roberts  Level of Service: NC OFFICE/OUTPT VISIT, EST, LEVL IV, 30-39 MIN      Assessment and Plan (including Health Maintenance)     Problem List Items Addressed This Visit    None  Visit Diagnoses       Diarrhea of presumed infectious origin    -  Primary    Relevant Medications    loperamide (IMODIUM) 2 mg capsule    Other Relevant Orders    POCT Influenza A/B (Completed)    POCT COVID-19 Rapid Screening (Completed)    Exposure to influenza        Relevant Orders    POCT Influenza A/B (Completed)    POCT COVID-19 Rapid Screening (Completed)    Viral illness        Relevant Orders    POCT Influenza A/B (Completed)    POCT COVID-19 Rapid Screening (Completed)          Push fluids/ Pedialyte is good one bottle per day  Imodium one pill by mouth 4 times daily as needed for diarrhea  [place] diet  Expect improvement from body aches/ malaise/ fever feeling after 2023  Most important: push fluids    Most likely flu exposure and outside window for antiviral medication  Treat symptoms with improvement expected after 2023  Health Maintenance Topics with due status: Not Due       Topic Last Completion Date    Colorectal Cancer Screening 2021    Mammogram 2023    Lipid Panel 2023    Cervical Cancer Screening 10/30/2023       Future Appointments   Date Time Provider Department Center   2024  1:00 PM Zelda Mckeon DO [facility]   2024  8:30 AM Alejandra Vann MD [facility] [place]   2024  9:45 AM [facility] [place]      No follow-ups on file.   Health Maintenance Due   Topic Date Due    Hepatitis C Screening  Never done    Pneumococcal Vaccines (Age 0-64) (1 - PCV) Never done    TETANUS VACCINE  Never done    Hemoglobin  A1c (Diabetic Prevention Screening)  Never done    LDCT Lung Screen  Never done    Shingles Vaccine (1 of 2) Never done    Influenza Vaccine (1) 09/01/2023    COVID-19 Vaccine (3 - 2023-24 season) 09/01/2023         Reason for Visit / Chief Complaint:   Diarrhea (Patient state she's been dealing with diarrhea for 3 days. Last episode was this morning. ), Night Sweats (Patient state she has been hot, having aches, and little chills. Denies SOB of breath. Patient has been exposed to the flu and have not tried any OTC medication. ), and Did not bring medication (Patient did not bring medication. State she has new prescriptions but does not recall medication names. )     History of Present Illness / Problem Focused Workflow     Nikkie Brown presents to the clinic with Diarrhea (Patient state she's been dealing with diarrhea for 3 days. Last episode was this morning. ), Night Sweats (Patient state she has been hot, having aches, and little chills. Denies SOB of breath. Patient has been exposed to the flu and have not tried any OTC medication. ), and Did not bring medication (Patient did not bring medication. State she has new prescriptions but does not recall medication names. )     Positive exposure to flu by significant other  Acute onset symptoms of cold and diarrhea started 12/23/2023  Reports three episodes diarrhea today  Decreased appetite  Recurrent chills with sweats      Diarrhea   This is a new problem. The current episode started in the past 7 days. The problem occurs 2 to 4 times per day. The problem has been unchanged. The stool consistency is described as Watery. The patient states that diarrhea does not awaken her from sleep. Associated symptoms include chills, coughing, a fever, headaches and sweats. Pertinent negatives include no abdominal pain. Nothing aggravates the symptoms. Risk factors include ill contacts. She has tried increased fluids for the symptoms. The treatment provided no relief.        Review of Systems     Review of Systems   Constitutional:  Positive for chills and fever.   Respiratory:  Positive for cough.    Gastrointestinal:  Positive for diarrhea. Negative for abdominal pain.   Neurological:  Positive for headaches.        Medical / Social / Family History     Past Medical History:   Diagnosis Date    Anxiety     Bipolar disorder     Chronic kidney disease, unspecified     Fracture of left ankle, sequela     Hypertension     Hypothyroidism     Polyp of descending colon 05/21/2021    Rectal polyp 05/21/2021    Schizophrenia, unspecified     Screening for malignant neoplasm of colon 05/21/2021       Past Surgical History:   Procedure Laterality Date     left wrist repair      ANKLE SURGERY Left 2001    broke ankle has a plate in this ankle    APPLICATION OF EXTERNAL FIXATION DEVICE TO UPPER EXTREMITY Left 03/03/2022    Procedure: APPLICATION, EXTERNAL FIXATION DEVICE, SMALL, HAND OR WRIST;  Surgeon: Vidal Hernandez MD;  Location: [facility];  Service: Orthopedics;  Laterality: Left;    FRACTURE SURGERY      TUBAL LIGATION  1997       Social History  Nikkie Brown  reports that she has been smoking cigarettes. She started smoking about 25 years ago. She has a 25.9 pack-year smoking history. She has been exposed to tobacco smoke. She has never used smokeless tobacco. She reports that she does not currently use alcohol. She reports current drug use. Frequency: 3.00 times per week. Drug: Marijuana.    Family History  Nikkie Brown's family history includes Cancer in her father; Heart disease in her maternal grandmother; Hypertension in her father and sister; Intellectual disability in her father and sister; No Known Problems in her brother; Stroke in her maternal grandmother.    Medications and Allergies   Medications  Outpatient Medications Marked as Taking for the 12/27/23 encounter (Office Visit) with Jordyn Zuñiga FNP   Medication Sig Dispense Refill    amitriptyline  (ELAVIL) 75 MG tablet Take 1 tablet (75 mg total) by mouth nightly. 90 tablet 1    ARIPiprazole (ABILIFY) 15 MG Tab Take 1 tablet (15 mg total) by mouth once daily. 90 tablet 1    benztropine (COGENTIN) 1 MG tablet Take 1 tablet by mouth every evening.      busPIRone (BUSPAR) 30 MG Tab Take 1 tablet (30 mg total) by mouth 2 (two) times daily. 180 tablet 1    clorazepate (TRANXENE) 7.5 MG Tab       hydrALAZINE (APRESOLINE) 25 MG tablet Take 1 tablet (25 mg total) by mouth every 8 (eight) hours. 270 tablet 1    hydroCHLOROthiazide (HYDRODIURIL) 25 MG tablet Take 1 tablet (25 mg total) by mouth once daily. 90 tablet 1    hydrOXYzine pamoate (VISTARIL) 50 MG Cap Take 50 mg by mouth every evening.      levothyroxine (SYNTHROID) 100 MCG tablet Take 1 tablet (100 mcg total) by mouth before breakfast. 90 tablet 1    lisinopriL (PRINIVIL,ZESTRIL) 40 MG tablet Take 1 tablet (40 mg total) by mouth once daily. 90 tablet 1    losartan (COZAAR) 50 MG tablet Take 1 tablet by mouth once daily.      metoprolol succinate (TOPROL-XL) 25 MG 24 hr tablet Take 1 tablet (25 mg total) by mouth once daily. 90 tablet 1    traZODone (DESYREL) 300 MG tablet Take 1 tablet (300 mg total) by mouth nightly. (Patient taking differently: Take 300 mg by mouth nightly. Patient state she take 150 mg.) 90 tablet 1       Allergies  Review of patient's allergies indicates:  No Known Allergies    Physical Examination     Vitals:    12/27/23 1316   BP: 129/85   Pulse: 66   Temp: 98.1 °F (36.7 °C)    No LMP recorded. Patient is postmenopausal.  Physical Exam  Constitutional:       Comments: Mild ill appearance with watery eyes with erythremic ring to lower conjunctiva   HENT:      Head: Normocephalic.      Right Ear: No tenderness. A middle ear effusion is present.      Left Ear: A middle ear effusion is present.      Nose: Mucosal edema present.      Mouth/Throat:      Lips: Pink.   Cardiovascular:      Rate and Rhythm: Normal rate and regular rhythm.       Pulses: Normal pulses.   Pulmonary:      Effort: Pulmonary effort is normal.      Breath sounds: Normal breath sounds.   Abdominal:      Palpations: Abdomen is soft.      Tenderness: There is no abdominal tenderness. There is no guarding.      Comments: Normal bowel sounds to four quadrants   Musculoskeletal:      Cervical back: Normal range of motion and neck supple.   Lymphadenopathy:      Cervical: No cervical adenopathy.   Skin:     General: Skin is warm.   Neurological:      Mental Status: She is oriented to person, place, and time. Mental status is at baseline.   Psychiatric:         Mood and Affect: Mood normal.         Behavior: Behavior normal.          LABS:     I have reviewed old labs below:  Lab Results   Component Value Date    WBC 11.96 (H) 08/14/2023    HGB 13.7 08/14/2023    HCT 41.1 08/14/2023    MCV 85.3 08/14/2023     (H) 08/14/2023     09/06/2023    K 3.9 09/06/2023     09/06/2023    CALCIUM 9.4 09/06/2023    PHOS 3.2 08/14/2023    CO2 27 09/06/2023    GLU 92 09/06/2023    BUN 16 09/06/2023    CREATININE 1.71 (H) 09/06/2023    ANIONGAP 9 09/06/2023    ESTGFRAFRICA 56 10/27/2020    EGFRNONAA 37 (L) 03/03/2022    PROT 7.7 08/21/2023    ALBUMIN 3.6 08/21/2023    BILITOT 0.1 08/21/2023    ALKPHOS 79 08/21/2023    ALT 22 08/21/2023    AST 15 08/21/2023    INR 0.99 03/03/2022    CHOL 140 08/21/2023    TRIG 165 (H) 08/21/2023    HDL 40 08/21/2023    LDLCALC 67 08/21/2023    TSH 3.420 03/27/2023    MICROALBUR 1.3 08/14/2023       Signature:  FEDE Roberts  [address] [place] [address]  Phone #: [phone]  Fax #: [phone]       Date of encounter: 12/27/23    Patient Instructions   Push fluids/ Pedialyte is good one bottle per day  Imodium one pill by mouth 4 times daily as needed for diarrhea  [place] diet  Expect improvement from body aches/ malaise/ fever feeling after 12/29/2023  Most important: push fluids    Most likely  flu exposure and outside window for antiviral medication  Treat symptoms with improvement expected after 12/29/2023

## 2023-12-27 NOTE — PATIENT INSTRUCTIONS
Push fluids/ Pedialyte is good one bottle per day  Imodium one pill by mouth 4 times daily as needed for diarrhea  [place] diet  Expect improvement from body aches/ malaise/ fever feeling after 12/29/2023  Most important: push fluids    Most likely flu exposure and outside window for antiviral medication  Treat symptoms with improvement expected after 12/29/2023

## 2024-01-09 DIAGNOSIS — Z71.89 COMPLEX CARE COORDINATION: ICD-10-CM

## 2024-02-21 ENCOUNTER — OFFICE VISIT (OUTPATIENT)
Dept: FAMILY MEDICINE | Facility: CLINIC | Age: 59
End: 2024-02-21
Payer: MEDICARE

## 2024-02-21 VITALS
HEART RATE: 76 BPM | DIASTOLIC BLOOD PRESSURE: 86 MMHG | RESPIRATION RATE: 16 BRPM | HEIGHT: 67 IN | TEMPERATURE: 99 F | OXYGEN SATURATION: 98 % | WEIGHT: 222 LBS | BODY MASS INDEX: 34.84 KG/M2 | SYSTOLIC BLOOD PRESSURE: 132 MMHG

## 2024-02-21 DIAGNOSIS — F20.9 SCHIZOPHRENIA, UNSPECIFIED TYPE: ICD-10-CM

## 2024-02-21 DIAGNOSIS — E66.01 SEVERE OBESITY (BMI 35.0-39.9) WITH COMORBIDITY: ICD-10-CM

## 2024-02-21 DIAGNOSIS — E03.9 ACQUIRED HYPOTHYROIDISM: ICD-10-CM

## 2024-02-21 DIAGNOSIS — I10 ESSENTIAL HYPERTENSION: ICD-10-CM

## 2024-02-21 DIAGNOSIS — N18.32 STAGE 3B CHRONIC KIDNEY DISEASE: ICD-10-CM

## 2024-02-21 LAB
ALBUMIN SERPL BCP-MCNC: 3.8 G/DL (ref 3.5–5)
ALBUMIN/GLOB SERPL: 0.9 {RATIO}
ALP SERPL-CCNC: 89 U/L (ref 46–118)
ALT SERPL W P-5'-P-CCNC: 23 U/L (ref 13–56)
ANION GAP SERPL CALCULATED.3IONS-SCNC: 11 MMOL/L (ref 7–16)
AST SERPL W P-5'-P-CCNC: 19 U/L (ref 15–37)
ATYPICAL LYMPHOCYTES: ABNORMAL
BASOPHILS # BLD AUTO: 0.07 K/UL (ref 0–0.2)
BASOPHILS NFR BLD AUTO: 0.8 % (ref 0–1)
BILIRUB SERPL-MCNC: 0.2 MG/DL (ref ?–1.2)
BUN SERPL-MCNC: 14 MG/DL (ref 7–18)
BUN/CREAT SERPL: 9 (ref 6–20)
CALCIUM SERPL-MCNC: 9 MG/DL (ref 8.5–10.1)
CHLORIDE SERPL-SCNC: 103 MMOL/L (ref 98–107)
CHOLEST SERPL-MCNC: 160 MG/DL (ref 0–200)
CHOLEST/HDLC SERPL: 5.3 {RATIO}
CO2 SERPL-SCNC: 28 MMOL/L (ref 21–32)
CREAT SERPL-MCNC: 1.61 MG/DL (ref 0.55–1.02)
DIFFERENTIAL METHOD BLD: ABNORMAL
EGFR (NO RACE VARIABLE) (RUSH/TITUS): 37 ML/MIN/1.73M2
EOSINOPHIL # BLD AUTO: 0.08 K/UL (ref 0–0.5)
EOSINOPHIL NFR BLD AUTO: 0.9 % (ref 1–4)
ERYTHROCYTE [DISTWIDTH] IN BLOOD BY AUTOMATED COUNT: 15.1 % (ref 11.5–14.5)
GLOBULIN SER-MCNC: 4.3 G/DL (ref 2–4)
GLUCOSE SERPL-MCNC: 89 MG/DL (ref 74–106)
HCT VFR BLD AUTO: 40.4 % (ref 38–47)
HDLC SERPL-MCNC: 30 MG/DL (ref 40–60)
HGB BLD-MCNC: 13.4 G/DL (ref 12–16)
IMM GRANULOCYTES # BLD AUTO: 0.01 K/UL (ref 0–0.04)
IMM GRANULOCYTES NFR BLD: 0.1 % (ref 0–0.4)
LDLC SERPL CALC-MCNC: 97 MG/DL
LDLC/HDLC SERPL: 3.2 {RATIO}
LYMPHOCYTES # BLD AUTO: 5.32 K/UL (ref 1–4.8)
LYMPHOCYTES NFR BLD AUTO: 61.3 % (ref 27–41)
LYMPHOCYTES NFR BLD MANUAL: 64 % (ref 27–41)
MCH RBC QN AUTO: 28.4 PG (ref 27–31)
MCHC RBC AUTO-ENTMCNC: 33.2 G/DL (ref 32–36)
MCV RBC AUTO: 85.6 FL (ref 80–96)
MONOCYTES # BLD AUTO: 0.33 K/UL (ref 0–0.8)
MONOCYTES NFR BLD AUTO: 3.8 % (ref 2–6)
MONOCYTES NFR BLD MANUAL: 4 % (ref 2–6)
MPC BLD CALC-MCNC: 10.2 FL (ref 9.4–12.4)
NEUTROPHILS # BLD AUTO: 2.87 K/UL (ref 1.8–7.7)
NEUTROPHILS NFR BLD AUTO: 33.1 % (ref 53–65)
NEUTS SEG NFR BLD MANUAL: 32 % (ref 50–62)
NONHDLC SERPL-MCNC: 130 MG/DL
NRBC # BLD AUTO: 0 X10E3/UL
NRBC, AUTO (.00): 0 %
PLATELET # BLD AUTO: 357 K/UL (ref 150–400)
PLATELET MORPHOLOGY: ABNORMAL
POTASSIUM SERPL-SCNC: 4 MMOL/L (ref 3.5–5.1)
PROT SERPL-MCNC: 8.1 G/DL (ref 6.4–8.2)
RBC # BLD AUTO: 4.72 M/UL (ref 4.2–5.4)
RBC MORPH BLD: NORMAL
SODIUM SERPL-SCNC: 138 MMOL/L (ref 136–145)
TRIGL SERPL-MCNC: 165 MG/DL (ref 35–150)
TSH SERPL DL<=0.005 MIU/L-ACNC: 2.1 UIU/ML (ref 0.36–3.74)
VLDLC SERPL-MCNC: 33 MG/DL
WBC # BLD AUTO: 8.68 K/UL (ref 4.5–11)

## 2024-02-21 PROCEDURE — 4010F ACE/ARB THERAPY RXD/TAKEN: CPT | Mod: ,,, | Performed by: FAMILY MEDICINE

## 2024-02-21 PROCEDURE — 3008F BODY MASS INDEX DOCD: CPT | Mod: ,,, | Performed by: FAMILY MEDICINE

## 2024-02-21 PROCEDURE — 80061 LIPID PANEL: CPT | Mod: ,,, | Performed by: CLINICAL MEDICAL LABORATORY

## 2024-02-21 PROCEDURE — 3075F SYST BP GE 130 - 139MM HG: CPT | Mod: ,,, | Performed by: FAMILY MEDICINE

## 2024-02-21 PROCEDURE — 1159F MED LIST DOCD IN RCRD: CPT | Mod: ,,, | Performed by: FAMILY MEDICINE

## 2024-02-21 PROCEDURE — 3079F DIAST BP 80-89 MM HG: CPT | Mod: ,,, | Performed by: FAMILY MEDICINE

## 2024-02-21 PROCEDURE — 1160F RVW MEDS BY RX/DR IN RCRD: CPT | Mod: ,,, | Performed by: FAMILY MEDICINE

## 2024-02-21 PROCEDURE — 80050 GENERAL HEALTH PANEL: CPT | Mod: ,,, | Performed by: CLINICAL MEDICAL LABORATORY

## 2024-02-21 PROCEDURE — 99214 OFFICE O/P EST MOD 30 MIN: CPT | Mod: ,,, | Performed by: FAMILY MEDICINE

## 2024-02-21 RX ORDER — DICLOFENAC SODIUM 30 MG/G
2 GEL TOPICAL 4 TIMES DAILY
COMMUNITY

## 2024-02-21 RX ORDER — AMANTADINE HYDROCHLORIDE 100 MG/1
1 TABLET ORAL 2 TIMES DAILY
COMMUNITY
Start: 2024-01-29

## 2024-02-21 RX ORDER — QUETIAPINE FUMARATE 100 MG/1
100 TABLET, FILM COATED ORAL DAILY
COMMUNITY
End: 2024-02-21

## 2024-02-21 RX ORDER — ZOLPIDEM TARTRATE 5 MG
2.5-5 TABLET ORAL NIGHTLY PRN
COMMUNITY
Start: 2024-01-29

## 2024-02-21 RX ORDER — ERGOCALCIFEROL 1.25 MG/1
50000 CAPSULE ORAL
COMMUNITY
End: 2024-02-21

## 2024-02-21 RX ORDER — HYDRALAZINE HYDROCHLORIDE 25 MG/1
25 TABLET, FILM COATED ORAL EVERY 8 HOURS
Qty: 270 TABLET | Refills: 1 | Status: SHIPPED | OUTPATIENT
Start: 2024-02-21

## 2024-02-21 RX ORDER — LEVOTHYROXINE SODIUM 100 UG/1
100 TABLET ORAL
Qty: 90 TABLET | Refills: 1 | Status: SHIPPED | OUTPATIENT
Start: 2024-02-21 | End: 2025-02-20

## 2024-02-21 RX ORDER — HYDROCHLOROTHIAZIDE 25 MG/1
25 TABLET ORAL DAILY
Qty: 90 TABLET | Refills: 1 | Status: SHIPPED | OUTPATIENT
Start: 2024-02-21

## 2024-02-21 RX ORDER — IBUPROFEN 800 MG/1
800 TABLET ORAL EVERY 6 HOURS PRN
COMMUNITY
Start: 2024-01-18 | End: 2024-02-21

## 2024-02-21 RX ORDER — METOPROLOL SUCCINATE 25 MG/1
25 TABLET, EXTENDED RELEASE ORAL DAILY
Qty: 90 TABLET | Refills: 1 | Status: SHIPPED | OUTPATIENT
Start: 2024-02-21 | End: 2025-02-20

## 2024-02-21 RX ORDER — LISINOPRIL 40 MG/1
40 TABLET ORAL DAILY
Qty: 90 TABLET | Refills: 1 | Status: SHIPPED | OUTPATIENT
Start: 2024-02-21

## 2024-02-21 NOTE — PROGRESS NOTES
New Clinic Note    Nikkie Brown is a 59 y.o. female     CC:   Chief Complaint   Patient presents with    Follow-up     Patient is here for her follow up today and refills. Patient has not taken any of her blood pressure medications this morning.         Subjective    History of Present Illness HPI   Patient is for evaluation of chronic medical problems. Patient needs refills. Leona  is tolerating medications well without side effects.       Current Outpatient Medications:     amantadine  mg Tab, Take 1 tablet by mouth 2 (two) times daily., Disp: , Rfl:     AMBIEN 5 mg Tab, Take 2.5-5 mg by mouth nightly as needed., Disp: , Rfl:     amitriptyline (ELAVIL) 75 MG tablet, Take 1 tablet (75 mg total) by mouth nightly., Disp: 90 tablet, Rfl: 1    ARIPiprazole (ABILIFY) 15 MG Tab, Take 1 tablet (15 mg total) by mouth once daily., Disp: 90 tablet, Rfl: 1    busPIRone (BUSPAR) 30 MG Tab, Take 1 tablet (30 mg total) by mouth 2 (two) times daily., Disp: 180 tablet, Rfl: 1    diclofenac sodium (SOLARAZE) 3 % gel, Apply 2 g topically 4 (four) times daily., Disp: , Rfl:     hydrOXYzine pamoate (VISTARIL) 50 MG Cap, Take 50 mg by mouth every evening., Disp: , Rfl:     traZODone (DESYREL) 300 MG tablet, Take 1 tablet (300 mg total) by mouth nightly. (Patient taking differently: Take 300 mg by mouth nightly. Patient state she take 150 mg.), Disp: 90 tablet, Rfl: 1    hydrALAZINE (APRESOLINE) 25 MG tablet, Take 1 tablet (25 mg total) by mouth every 8 (eight) hours., Disp: 270 tablet, Rfl: 1    hydroCHLOROthiazide (HYDRODIURIL) 25 MG tablet, Take 1 tablet (25 mg total) by mouth once daily., Disp: 90 tablet, Rfl: 1    levothyroxine (SYNTHROID) 100 MCG tablet, Take 1 tablet (100 mcg total) by mouth before breakfast., Disp: 90 tablet, Rfl: 1    lisinopriL (PRINIVIL,ZESTRIL) 40 MG tablet, Take 1 tablet (40 mg total) by mouth once daily., Disp: 90 tablet, Rfl: 1    metoprolol succinate (TOPROL-XL) 25 MG 24 hr tablet, Take 1  tablet (25 mg total) by mouth once daily., Disp: 90 tablet, Rfl: 1     Past Medical History:   Diagnosis Date    Anxiety     Bipolar disorder     Chronic kidney disease, unspecified     Fracture of left ankle, sequela     Hypertension     Hypothyroidism     Polyp of descending colon 05/21/2021    Rectal polyp 05/21/2021    Schizophrenia, unspecified     Screening for malignant neoplasm of colon 05/21/2021        Family History   Problem Relation Age of Onset    Hypertension Father     Intellectual disability Father     Cancer Father     Mental illness Father     Hypertension Sister     Intellectual disability Sister     Depression Sister     Stroke Maternal Grandmother     Heart disease Maternal Grandmother     No Known Problems Brother         Past Surgical History:   Procedure Laterality Date     left wrist repair      ANKLE SURGERY Left 2001    broke ankle has a plate in this ankle    APPLICATION OF EXTERNAL FIXATION DEVICE TO UPPER EXTREMITY Left 03/03/2022    Procedure: APPLICATION, EXTERNAL FIXATION DEVICE, SMALL, HAND OR WRIST;  Surgeon: Vidal Hernandez MD;  Location: [facility];  Service: Orthopedics;  Laterality: Left;    FRACTURE SURGERY      TUBAL LIGATION  1997        Social History     Socioeconomic History    Marital status:     Number of children: 5   Occupational History    Occupation: not employed   Tobacco Use    Smoking status: Every Day     Current packs/day: 1.00     Average packs/day: 1 pack/day for 26.0 years (26.0 ttl pk-yrs)     Types: Cigarettes     Start date: 2/8/1998     Passive exposure: Current    Smokeless tobacco: Never   Substance and Sexual Activity    Alcohol use: Not Currently    Drug use: Yes     Frequency: 3.0 times per week     Types: Marijuana     Comment: patient states she smokes 3 blunts a week.    Sexual activity: Yes     Partners: Male     Comment: Tube tied   Social History Narrative    Lives alone.        Review of Systems   Constitutional:  Negative for  "fatigue and fever.   HENT:  Negative for ear pain, postnasal drip, rhinorrhea and sinus pressure/congestion.    Respiratory:  Negative for cough and shortness of breath.    Cardiovascular:  Negative for chest pain.   Gastrointestinal:  Negative for abdominal pain, diarrhea, nausea and vomiting.   Genitourinary:  Negative for dysuria.   Neurological:  Negative for headaches.        /86 (BP Location: Left arm, Patient Position: Sitting) Comment: Manual  Pulse 76   Temp 98.5 °F (36.9 °C) (Oral)   Resp 16   Ht 5' 7" (1.702 m)   Wt 100.7 kg (222 lb)   SpO2 98%   BMI 34.77 kg/m²      Physical Exam  HENT:      Head: Normocephalic and atraumatic.   Cardiovascular:      Rate and Rhythm: Normal rate and regular rhythm.   Pulmonary:      Effort: Pulmonary effort is normal.      Breath sounds: Normal breath sounds.   Neurological:      Mental Status: She is alert and oriented to person, place, and time.   Psychiatric:         Mood and Affect: Mood normal.         Behavior: Behavior normal.          Assessment and Plan      ICD-10-CM ICD-9-CM   1. Acquired hypothyroidism  E03.9 244.9   2. Essential hypertension  I10 401.9   3. Severe obesity (BMI 35.0-39.9) with comorbidity  E66.01 278.01   4. Schizophrenia, unspecified type  F20.9 295.90   5. Stage 3b chronic kidney disease  N18.32 585.3        1. Acquired hypothyroidism  The current medical regimen is effective;  continue present plan and medications.  -     levothyroxine (SYNTHROID) 100 MCG tablet; Take 1 tablet (100 mcg total) by mouth before breakfast.  Dispense: 90 tablet; Refill: 1  -     TSH; Future; Expected date: 02/21/2024    2. Essential hypertension  The current medical regimen is effective;  continue present plan and medications.  -     lisinopriL (PRINIVIL,ZESTRIL) 40 MG tablet; Take 1 tablet (40 mg total) by mouth once daily.  Dispense: 90 tablet; Refill: 1  -     metoprolol succinate (TOPROL-XL) 25 MG 24 hr tablet; Take 1 tablet (25 mg total) by " mouth once daily.  Dispense: 90 tablet; Refill: 1  -     hydroCHLOROthiazide (HYDRODIURIL) 25 MG tablet; Take 1 tablet (25 mg total) by mouth once daily.  Dispense: 90 tablet; Refill: 1  -     hydrALAZINE (APRESOLINE) 25 MG tablet; Take 1 tablet (25 mg total) by mouth every 8 (eight) hours.  Dispense: 270 tablet; Refill: 1  -     Lipid Panel; Future; Expected date: 02/21/2024  -     Comprehensive Metabolic Panel; Future; Expected date: 02/21/2024  -     CBC Auto Differential; Future; Expected date: 02/21/2024    3. Severe obesity (BMI 35.0-39.9) with comorbidity  Diet and educational handouts given.    4. Schizophrenia, unspecified type  The current medical regimen is effective;  continue present plan and medications.    5. Stage 3b chronic kidney disease  Stable. Will recheck lab today.          Patient Instructions   Take Medications as directed  Monitor blood pressure outside of clinic  Eat a heart healthy diet and get plenty of cardiovascular exercise.       Follow up in about 6 months (around 8/21/2024).     This information was created by a scribe under my supervision and in my presence. I have reviewed and agree with the scribe's documentation.

## 2024-05-17 DIAGNOSIS — Z12.31 BREAST CANCER SCREENING BY MAMMOGRAM: Primary | ICD-10-CM

## 2024-06-19 ENCOUNTER — OFFICE VISIT (OUTPATIENT)
Dept: NEPHROLOGY | Facility: CLINIC | Age: 59
End: 2024-06-19
Payer: MEDICARE

## 2024-06-19 VITALS
DIASTOLIC BLOOD PRESSURE: 88 MMHG | SYSTOLIC BLOOD PRESSURE: 138 MMHG | WEIGHT: 216 LBS | OXYGEN SATURATION: 96 % | BODY MASS INDEX: 33.9 KG/M2 | HEIGHT: 67 IN | RESPIRATION RATE: 18 BRPM | HEART RATE: 70 BPM

## 2024-06-19 DIAGNOSIS — N18.32 STAGE 3B CHRONIC KIDNEY DISEASE: Primary | ICD-10-CM

## 2024-06-19 PROCEDURE — 99215 OFFICE O/P EST HI 40 MIN: CPT | Mod: PBBFAC | Performed by: NURSE PRACTITIONER

## 2024-06-19 PROCEDURE — 99213 OFFICE O/P EST LOW 20 MIN: CPT | Mod: S$PBB,,, | Performed by: NURSE PRACTITIONER

## 2024-06-19 PROCEDURE — 1159F MED LIST DOCD IN RCRD: CPT | Mod: CPTII,,, | Performed by: NURSE PRACTITIONER

## 2024-06-19 PROCEDURE — 4010F ACE/ARB THERAPY RXD/TAKEN: CPT | Mod: CPTII,,, | Performed by: NURSE PRACTITIONER

## 2024-06-19 PROCEDURE — 3008F BODY MASS INDEX DOCD: CPT | Mod: CPTII,,, | Performed by: NURSE PRACTITIONER

## 2024-06-19 PROCEDURE — 3075F SYST BP GE 130 - 139MM HG: CPT | Mod: CPTII,,, | Performed by: NURSE PRACTITIONER

## 2024-06-19 PROCEDURE — 3079F DIAST BP 80-89 MM HG: CPT | Mod: CPTII,,, | Performed by: NURSE PRACTITIONER

## 2024-06-19 PROCEDURE — 99999 PR PBB SHADOW E&M-EST. PATIENT-LVL V: CPT | Mod: PBBFAC,,, | Performed by: NURSE PRACTITIONER

## 2024-06-19 RX ORDER — TRAZODONE HYDROCHLORIDE 150 MG/1
150 TABLET ORAL NIGHTLY PRN
COMMUNITY
Start: 2024-04-17

## 2024-06-19 NOTE — PROGRESS NOTES
[facility] History and Physical  Patient Name: Nikkie Brown  MRN: [id number]  Age: 59 y.o.  : 1965    Date: 2024 1:59 PM    Chief Complaint: Establish Care    HPI :   Ms Brown presents to Nephrology clinic to establish care. She is followed by Alejandra Vann MD as her primary care provider.     HTN: diagnosed 19 years old. Current regimen includes hydralazine 25 mg TID, hydrochlorothiazide 25 mg daily, lisinopril 40 mg daily, toprol 25 mg daily. BP is better than previous visit. Mildly elevated but states that BP's have been normal at home and last visit in February was normal as well.      Schizophrenia: follows with Emeka. On Abilify and buspar     Hypothyroidism: synthroid     Nephrology history: No FH of kidney disease, no nephrolithiasis, or recurrent UTIs. No OTC medications including NSAIDs. Used to take naproxen daily for pain. No longer doing so.     Patient denies any CP, SOB, peripheral edema, dysuria, hematuria, changes in urinary habits, or increased frequency of urination.     Past Medical History:  has a past medical history of Anxiety, Bipolar disorder, Chronic kidney disease, unspecified, Fracture of left ankle, sequela, Hypertension, Hypothyroidism, Polyp of descending colon (2021), Rectal polyp (2021), Schizophrenia, unspecified, and Screening for malignant neoplasm of colon (2021).     Past Surgical History:   has a past surgical history that includes Tubal ligation (); Ankle surgery (Left, ); Application of external fixation device to upper extremity (Left, 2022); Fracture surgery; and  left wrist repair.     Family History:  family history includes Cancer in her father; Depression in her sister; Heart disease in her maternal grandmother; Hypertension in her father and sister; Intellectual disability in her father and sister; Mental illness in her father; No Known Problems in her brother;  Stroke in her maternal grandmother. No family history of kidney disease.     Social History:   reports that she has been smoking cigarettes. She started smoking about 26 years ago. She has a 26.4 pack-year smoking history. She has been exposed to tobacco smoke. She has never used smokeless tobacco. She reports that she does not currently use alcohol. She reports current drug use. Frequency: 3.00 times per week. Drug: Marijuana.     Allergies: has No Known Allergies.     Medications: Reviewed including OTC medications, herbal supplements, and NSAIDS.     Old records have been reviewed.      Review of Systems:  ROS: A 10 point ROS was completed and found to be negative except for that mentioned above.          Physical Exam:  Vitals:    06/19/24 1328   BP: 138/88   Pulse:    Resp:        Constitutional: sitting in chair, in NAD  Eyes: EOMI, white sclera  ENMT: moist mucus membranes, nares patent  Cardiovascular: normal rate, S1/S2 noted, no edema  Respiratory: symmetrical chest expansion, CTA-B  Gastrointestinal: +BS, soft, NT/ND  Musculoskeletal: normal, no joint erythema/effusions  Skin: no rash, no purpura, warm extremities  Neurological: Alert and Oriented x 4, afocal    Labs:   Lab Results   Component Value Date     02/21/2024    K 4.0 02/21/2024    CREATININE 1.61 (H) 02/21/2024    ALT 23 02/21/2024    AST 19 02/21/2024    LDLCALC 97 02/21/2024    CHOL 160 02/21/2024    HDL 30 (L) 02/21/2024    TRIG 165 (H) 02/21/2024    TSH 2.100 02/21/2024    WBC 8.68 02/21/2024    HGB 13.4 02/21/2024    HCT 40.4 02/21/2024     02/21/2024        Otherwise Reviewed    Assessment/Plan:     CKD stage IIIb in setting of hypertensive nephrosclerosis, NSAID use. Baseline sCr 1.5-1.7, today sCr pending. Counseled to avoid nephrotoxic agents such as NSAIDs. She is on ACEi which will help with CKD, HTN. Will check proteinuria labs to see if SGLT2i would be indicated in future.     Proteinuria: urine Prot:Creat ratio is  pending. Patient is on RAAS blockade with ACEi    Anemia: CBC pending    BMD: Calcium and Phosphorus PTH, Vit D pending    HTN: well controlled with current meds     RTC 6 months with CBC, RFP, UA, urine for Prot:creat ratio, PTH, Vit D      Signature:             FEDE James  [facility]  [address] [facility] [address]  [phone]        20 minutes of total time spent on the encounter, which includes face to face time and non-face to face time preparing to see the patient (eg, review of tests), Obtaining and/or reviewing separately obtained history, Documenting clinical information in the electronic or other health record, Independently interpreting results (not separately reported) and communicating results to the patient/family/caregiver, or Care coordination (not separately reported).       Date of encounter: 6/19/24

## 2024-07-19 ENCOUNTER — OFFICE VISIT (OUTPATIENT)
Dept: FAMILY MEDICINE | Facility: CLINIC | Age: 59
End: 2024-07-19
Payer: MEDICARE

## 2024-07-19 VITALS
DIASTOLIC BLOOD PRESSURE: 85 MMHG | RESPIRATION RATE: 18 BRPM | HEART RATE: 79 BPM | OXYGEN SATURATION: 98 % | WEIGHT: 217 LBS | BODY MASS INDEX: 34.06 KG/M2 | TEMPERATURE: 99 F | HEIGHT: 67 IN | SYSTOLIC BLOOD PRESSURE: 129 MMHG

## 2024-07-19 DIAGNOSIS — R19.7 DIARRHEA, UNSPECIFIED TYPE: Primary | ICD-10-CM

## 2024-07-19 DIAGNOSIS — R10.13 EPIGASTRIC ABDOMINAL PAIN: ICD-10-CM

## 2024-07-19 LAB
ALBUMIN SERPL BCP-MCNC: 4.1 G/DL (ref 3.5–5)
ALBUMIN/GLOB SERPL: 1 {RATIO}
ALP SERPL-CCNC: 85 U/L (ref 46–118)
ALT SERPL W P-5'-P-CCNC: 29 U/L (ref 13–56)
ANION GAP SERPL CALCULATED.3IONS-SCNC: 6 MMOL/L (ref 7–16)
AST SERPL W P-5'-P-CCNC: 14 U/L (ref 15–37)
BASOPHILS # BLD AUTO: 0.08 K/UL (ref 0–0.2)
BASOPHILS NFR BLD AUTO: 0.7 % (ref 0–1)
BILIRUB SERPL-MCNC: 0.2 MG/DL (ref ?–1.2)
BUN SERPL-MCNC: 13 MG/DL (ref 7–18)
BUN/CREAT SERPL: 8 (ref 6–20)
CALCIUM SERPL-MCNC: 9 MG/DL (ref 8.5–10.1)
CHLORIDE SERPL-SCNC: 109 MMOL/L (ref 98–107)
CO2 SERPL-SCNC: 27 MMOL/L (ref 21–32)
CREAT SERPL-MCNC: 1.7 MG/DL (ref 0.55–1.02)
DIFFERENTIAL METHOD BLD: ABNORMAL
EGFR (NO RACE VARIABLE) (RUSH/TITUS): 35 ML/MIN/1.73M2
EOSINOPHIL # BLD AUTO: 0.18 K/UL (ref 0–0.5)
EOSINOPHIL NFR BLD AUTO: 1.6 % (ref 1–4)
ERYTHROCYTE [DISTWIDTH] IN BLOOD BY AUTOMATED COUNT: 15.3 % (ref 11.5–14.5)
GIARDIA ANTIGEN: NEGATIVE
GLOBULIN SER-MCNC: 4.2 G/DL (ref 2–4)
GLUCOSE SERPL-MCNC: 110 MG/DL (ref 74–106)
HCT VFR BLD AUTO: 40.6 % (ref 38–47)
HGB BLD-MCNC: 13 G/DL (ref 12–16)
IMM GRANULOCYTES # BLD AUTO: 0.04 K/UL (ref 0–0.04)
IMM GRANULOCYTES NFR BLD: 0.4 % (ref 0–0.4)
LYMPHOCYTES # BLD AUTO: 3.97 K/UL (ref 1–4.8)
LYMPHOCYTES NFR BLD AUTO: 36.1 % (ref 27–41)
MCH RBC QN AUTO: 28 PG (ref 27–31)
MCHC RBC AUTO-ENTMCNC: 32 G/DL (ref 32–36)
MCV RBC AUTO: 87.5 FL (ref 80–96)
MONOCYTES # BLD AUTO: 0.46 K/UL (ref 0–0.8)
MONOCYTES NFR BLD AUTO: 4.2 % (ref 2–6)
MPC BLD CALC-MCNC: 10 FL (ref 9.4–12.4)
NEUTROPHILS # BLD AUTO: 6.26 K/UL (ref 1.8–7.7)
NEUTROPHILS NFR BLD AUTO: 57 % (ref 53–65)
NRBC # BLD AUTO: 0 X10E3/UL
NRBC, AUTO (.00): 0 %
PLATELET # BLD AUTO: 372 K/UL (ref 150–400)
POTASSIUM SERPL-SCNC: 3.7 MMOL/L (ref 3.5–5.1)
PROT SERPL-MCNC: 8.3 G/DL (ref 6.4–8.2)
RBC # BLD AUTO: 4.64 M/UL (ref 4.2–5.4)
SODIUM SERPL-SCNC: 138 MMOL/L (ref 136–145)
UREA BREATH TEST QL: POSITIVE
WBC # BLD AUTO: 10.99 K/UL (ref 4.5–11)

## 2024-07-19 PROCEDURE — 87015 SPECIMEN INFECT AGNT CONCNTJ: CPT | Mod: 59,,, | Performed by: CLINICAL MEDICAL LABORATORY

## 2024-07-19 PROCEDURE — 99213 OFFICE O/P EST LOW 20 MIN: CPT | Mod: ,,, | Performed by: FAMILY MEDICINE

## 2024-07-19 PROCEDURE — 4010F ACE/ARB THERAPY RXD/TAKEN: CPT | Mod: ,,, | Performed by: FAMILY MEDICINE

## 2024-07-19 PROCEDURE — 87329 GIARDIA AG IA: CPT | Mod: ,,, | Performed by: CLINICAL MEDICAL LABORATORY

## 2024-07-19 PROCEDURE — 1160F RVW MEDS BY RX/DR IN RCRD: CPT | Mod: ,,, | Performed by: FAMILY MEDICINE

## 2024-07-19 PROCEDURE — 3079F DIAST BP 80-89 MM HG: CPT | Mod: ,,, | Performed by: FAMILY MEDICINE

## 2024-07-19 PROCEDURE — 87209 SMEAR COMPLEX STAIN: CPT | Mod: ,,, | Performed by: CLINICAL MEDICAL LABORATORY

## 2024-07-19 PROCEDURE — 3008F BODY MASS INDEX DOCD: CPT | Mod: ,,, | Performed by: FAMILY MEDICINE

## 2024-07-19 PROCEDURE — 3061F NEG MICROALBUMINURIA REV: CPT | Mod: ,,, | Performed by: FAMILY MEDICINE

## 2024-07-19 PROCEDURE — 85025 COMPLETE CBC W/AUTO DIFF WBC: CPT | Mod: ,,, | Performed by: CLINICAL MEDICAL LABORATORY

## 2024-07-19 PROCEDURE — 83013 H PYLORI (C-13) BREATH: CPT | Mod: ,,, | Performed by: CLINICAL MEDICAL LABORATORY

## 2024-07-19 PROCEDURE — 87045 FECES CULTURE AEROBIC BACT: CPT | Mod: ,,, | Performed by: CLINICAL MEDICAL LABORATORY

## 2024-07-19 PROCEDURE — 87449 NOS EACH ORGANISM AG IA: CPT | Mod: ,,, | Performed by: CLINICAL MEDICAL LABORATORY

## 2024-07-19 PROCEDURE — 3066F NEPHROPATHY DOC TX: CPT | Mod: ,,, | Performed by: FAMILY MEDICINE

## 2024-07-19 PROCEDURE — 3074F SYST BP LT 130 MM HG: CPT | Mod: ,,, | Performed by: FAMILY MEDICINE

## 2024-07-19 PROCEDURE — 87177 OVA AND PARASITES SMEARS: CPT | Mod: ,,, | Performed by: CLINICAL MEDICAL LABORATORY

## 2024-07-19 PROCEDURE — 87493 C DIFF AMPLIFIED PROBE: CPT | Mod: ,,, | Performed by: CLINICAL MEDICAL LABORATORY

## 2024-07-19 PROCEDURE — 87427 SHIGA-LIKE TOXIN AG IA: CPT | Mod: ,,, | Performed by: CLINICAL MEDICAL LABORATORY

## 2024-07-19 PROCEDURE — 1159F MED LIST DOCD IN RCRD: CPT | Mod: ,,, | Performed by: FAMILY MEDICINE

## 2024-07-19 PROCEDURE — 80053 COMPREHEN METABOLIC PANEL: CPT | Mod: ,,, | Performed by: CLINICAL MEDICAL LABORATORY

## 2024-07-19 PROCEDURE — 87046 STOOL CULTR AEROBIC BACT EA: CPT | Mod: ,,, | Performed by: CLINICAL MEDICAL LABORATORY

## 2024-07-19 RX ORDER — OMEPRAZOLE 20 MG/1
20 CAPSULE, DELAYED RELEASE ORAL DAILY
COMMUNITY
Start: 2024-07-05

## 2024-07-19 NOTE — PROGRESS NOTES
New Clinic Note    Nikkie Brown is a 59 y.o. female     CC:   Chief Complaint   Patient presents with    Diarrhea    Nausea     Complains of 3 weeks of nausea and diarrhea with upper abdominal discomfort. Stated she finally went to the ER at [facility] and they gave her Zofran by mouth and sent her home. Stated they did not do any Xray's or blood work just told her  she probably had stomach virus. She is on disability due to schizophrenia and goes to [employer]. Still has her gallbladder. Uses [facility] in [place]. Stated she had no food this am but had water around 08:00 am.  Lives alone. She able to drive herself.         Subjective    History of Present Illness HPI   Patient complains of diarrhea for 3 weeks. She says she is having 5-6 watery bowel movements a day. She had nausea at the beginning but this has resolved. She went to [facility] ER 2 weeks ago and was told she had a virus. She was given zofran and omeprazole without relief. She complains of some epigastric pain.     Current Outpatient Medications:     amantadine  mg Tab, Take 1 tablet by mouth 2 (two) times daily., Disp: , Rfl:     amitriptyline (ELAVIL) 75 MG tablet, Take 1 tablet (75 mg total) by mouth nightly., Disp: 90 tablet, Rfl: 1    ARIPiprazole (ABILIFY) 15 MG Tab, Take 1 tablet (15 mg total) by mouth once daily., Disp: 90 tablet, Rfl: 1    busPIRone (BUSPAR) 30 MG Tab, Take 1 tablet (30 mg total) by mouth 2 (two) times daily., Disp: 180 tablet, Rfl: 1    diclofenac sodium (SOLARAZE) 3 % gel, Apply 2 g topically 4 (four) times daily., Disp: , Rfl:     hydrALAZINE (APRESOLINE) 25 MG tablet, Take 1 tablet (25 mg total) by mouth every 8 (eight) hours., Disp: 270 tablet, Rfl: 1    hydroCHLOROthiazide (HYDRODIURIL) 25 MG tablet, Take 1 tablet (25 mg total) by mouth once daily., Disp: 90 tablet, Rfl: 1    hydrOXYzine pamoate (VISTARIL) 50 MG Cap, Take 50 mg by mouth every evening., Disp: , Rfl:     levothyroxine (SYNTHROID) 100 MCG tablet,  Take 1 tablet (100 mcg total) by mouth before breakfast., Disp: 90 tablet, Rfl: 1    lisinopriL (PRINIVIL,ZESTRIL) 40 MG tablet, Take 1 tablet (40 mg total) by mouth once daily., Disp: 90 tablet, Rfl: 1    metoprolol succinate (TOPROL-XL) 25 MG 24 hr tablet, Take 1 tablet (25 mg total) by mouth once daily., Disp: 90 tablet, Rfl: 1    omeprazole (PRILOSEC) 20 MG capsule, Take 20 mg by mouth once daily., Disp: , Rfl:     traZODone (DESYREL) 150 MG tablet, Take 150 mg by mouth nightly as needed., Disp: , Rfl:     AMBIEN 5 mg Tab, Take 2.5-5 mg by mouth nightly as needed., Disp: , Rfl:     azithromycin (ZITHROMAX) 500 MG tablet, Take 1 tablet (500 mg total) by mouth once daily., Disp: 3 tablet, Rfl: 0     Past Medical History:   Diagnosis Date    Anxiety     Bipolar disorder     Chronic kidney disease, unspecified     Fracture of left ankle, sequela     Hypertension     Hypothyroidism     Polyp of descending colon 05/21/2021    Rectal polyp 05/21/2021    Schizophrenia, unspecified     Screening for malignant neoplasm of colon 05/21/2021        Family History   Problem Relation Name Age of Onset    Hypertension Father Vidal Guillaume     Intellectual disability Father Vidal Guillaume     Cancer Father Vidal Guillaume     Mental illness Father Vidal Guillaume     Hypertension Sister Jessica Walker     Intellectual disability Sister Jessica Walker     Depression Sister Jessica Walker     Stroke Maternal Grandmother Michelle Markell     Heart disease Maternal Grandmother Michelle Guillaume     No Known Problems Brother          Past Surgical History:   Procedure Laterality Date     left wrist repair      ANKLE SURGERY Left 2001    broke ankle has a plate in this ankle    APPLICATION OF EXTERNAL FIXATION DEVICE TO UPPER EXTREMITY Left 03/03/2022    Procedure: APPLICATION, EXTERNAL FIXATION DEVICE, SMALL, HAND OR WRIST;  Surgeon: Vidal Hernandez MD;  Location: [facility];  Service: Orthopedics;  Laterality: Left;     "FRACTURE SURGERY      TUBAL LIGATION  1997        Review of Systems   Constitutional:  Negative for fatigue and fever.   HENT:  Negative for ear pain, postnasal drip, rhinorrhea and sinus pressure/congestion.    Respiratory:  Negative for cough and shortness of breath.    Cardiovascular:  Negative for chest pain.   Gastrointestinal:  Positive for diarrhea and nausea. Negative for abdominal pain and vomiting.   Genitourinary:  Negative for dysuria.   Neurological:  Negative for headaches.        /85 (BP Location: Right arm, Patient Position: Sitting, BP Method: Large (Automatic))   Pulse 79   Temp 98.7 °F (37.1 °C) (Oral)   Resp 18   Ht 5' 7" (1.702 m)   Wt 98.4 kg (217 lb)   SpO2 98%   BMI 33.99 kg/m²      Physical Exam  HENT:      Head: Normocephalic and atraumatic.   Cardiovascular:      Rate and Rhythm: Normal rate and regular rhythm.   Pulmonary:      Effort: Pulmonary effort is normal.      Breath sounds: Normal breath sounds.   Neurological:      Mental Status: She is alert and oriented to person, place, and time.   Psychiatric:         Mood and Affect: Mood normal.         Behavior: Behavior normal.          Assessment and Plan      ICD-10-CM ICD-9-CM   1. Diarrhea, unspecified type  R19.7 787.91   2. Epigastric abdominal pain  R10.13 789.06        1. Diarrhea, unspecified type  -     CBC Auto Differential; Future; Expected date: 07/19/2024  -     Comprehensive Metabolic Panel; Future; Expected date: 07/19/2024  -     Stool culture; Future; Expected date: 07/19/2024  -     C Diff Toxin by PCR  -     Giardia antigen  -     Ova and Parasite Exam  -     Stool Antigen Test    2. Epigastric abdominal pain  -     H. pylori Breath Test; Future; Expected date: 07/19/2024  -     US Abdomen Limited_Gallbladder; Future; Expected date: 07/19/2024             Follow up if symptoms worsen or fail to improve.       "

## 2024-07-20 LAB
C DIFF TOX A+B STL IA-ACNC: NEGATIVE
CAMPYLOBACTER ANTIGEN: POSITIVE
EHEC (SHIGA TOXIN 1): NEGATIVE
EHEC (SHIGA TOXIN 2): NEGATIVE

## 2024-07-22 LAB
O+P STL CONC: NORMAL
TRICHROME SMEAR: NORMAL

## 2024-07-22 NOTE — PROGRESS NOTES
Food poisoning. Positive for campylobacter. Azithromycin 500mg daily for 3 days. Let us know if she does not improve.

## 2024-07-23 ENCOUNTER — PATIENT MESSAGE (OUTPATIENT)
Dept: FAMILY MEDICINE | Facility: CLINIC | Age: 59
End: 2024-07-23
Payer: MEDICARE

## 2024-07-23 DIAGNOSIS — A04.5 CAMPYLOBACTER ENTERITIS: Primary | ICD-10-CM

## 2024-07-23 DIAGNOSIS — A05.8: ICD-10-CM

## 2024-07-23 RX ORDER — AZITHROMYCIN 500 MG/1
500 TABLET, FILM COATED ORAL DAILY
Qty: 3 TABLET | Refills: 0 | Status: SHIPPED | OUTPATIENT
Start: 2024-07-23

## 2024-07-23 NOTE — TELEPHONE ENCOUNTER
----- Message from Alejandra Vann MD sent at 7/22/2024  2:38 PM CDT -----  Food poisoning. Positive for campylobacter. Azithromycin 500mg daily for 3 days. Let us know if she does not improve.

## 2024-07-24 ENCOUNTER — PATIENT MESSAGE (OUTPATIENT)
Dept: FAMILY MEDICINE | Facility: CLINIC | Age: 59
End: 2024-07-24
Payer: MEDICARE

## 2024-07-24 ENCOUNTER — HOSPITAL ENCOUNTER (OUTPATIENT)
Dept: RADIOLOGY | Facility: HOSPITAL | Age: 59
Discharge: HOME | End: 2024-07-24
Attending: FAMILY MEDICINE
Payer: MEDICARE

## 2024-07-24 DIAGNOSIS — R10.13 EPIGASTRIC ABDOMINAL PAIN: ICD-10-CM

## 2024-07-24 PROCEDURE — 76705 ECHO EXAM OF ABDOMEN: CPT | Mod: TC

## 2024-07-24 NOTE — PROGRESS NOTES
Patient has a fatty liver. Diet and exercise. She has sludge in her gallbladder but no stones. See if she feels better after being treated for campylobacter. If not can send to a surgeon for evaluation.

## 2024-07-26 LAB — SALM+SHIG+E COLI ETEC STL CULT: NORMAL

## 2024-08-09 DIAGNOSIS — Z71.89 COMPLEX CARE COORDINATION: ICD-10-CM

## 2024-08-21 ENCOUNTER — OFFICE VISIT (OUTPATIENT)
Dept: FAMILY MEDICINE | Facility: CLINIC | Age: 59
End: 2024-08-21
Payer: MEDICARE

## 2024-08-21 VITALS
RESPIRATION RATE: 18 BRPM | SYSTOLIC BLOOD PRESSURE: 112 MMHG | OXYGEN SATURATION: 97 % | HEART RATE: 75 BPM | HEIGHT: 67 IN | BODY MASS INDEX: 33.9 KG/M2 | WEIGHT: 216 LBS | DIASTOLIC BLOOD PRESSURE: 76 MMHG | TEMPERATURE: 99 F

## 2024-08-21 DIAGNOSIS — E03.9 ACQUIRED HYPOTHYROIDISM: ICD-10-CM

## 2024-08-21 DIAGNOSIS — I10 ESSENTIAL HYPERTENSION: Primary | ICD-10-CM

## 2024-08-21 DIAGNOSIS — E21.3 HYPERPARATHYROIDISM: ICD-10-CM

## 2024-08-21 LAB — TSH SERPL DL<=0.005 MIU/L-ACNC: 0.43 UIU/ML (ref 0.36–3.74)

## 2024-08-21 PROCEDURE — 3074F SYST BP LT 130 MM HG: CPT | Mod: ,,, | Performed by: FAMILY MEDICINE

## 2024-08-21 PROCEDURE — 3061F NEG MICROALBUMINURIA REV: CPT | Mod: ,,, | Performed by: FAMILY MEDICINE

## 2024-08-21 PROCEDURE — 3066F NEPHROPATHY DOC TX: CPT | Mod: ,,, | Performed by: FAMILY MEDICINE

## 2024-08-21 PROCEDURE — 3008F BODY MASS INDEX DOCD: CPT | Mod: ,,, | Performed by: FAMILY MEDICINE

## 2024-08-21 PROCEDURE — 4010F ACE/ARB THERAPY RXD/TAKEN: CPT | Mod: ,,, | Performed by: FAMILY MEDICINE

## 2024-08-21 PROCEDURE — 84443 ASSAY THYROID STIM HORMONE: CPT | Mod: ,,, | Performed by: CLINICAL MEDICAL LABORATORY

## 2024-08-21 PROCEDURE — 1159F MED LIST DOCD IN RCRD: CPT | Mod: ,,, | Performed by: FAMILY MEDICINE

## 2024-08-21 PROCEDURE — 3078F DIAST BP <80 MM HG: CPT | Mod: ,,, | Performed by: FAMILY MEDICINE

## 2024-08-21 PROCEDURE — 99214 OFFICE O/P EST MOD 30 MIN: CPT | Mod: ,,, | Performed by: FAMILY MEDICINE

## 2024-08-21 PROCEDURE — 1160F RVW MEDS BY RX/DR IN RCRD: CPT | Mod: ,,, | Performed by: FAMILY MEDICINE

## 2024-08-21 RX ORDER — METOPROLOL SUCCINATE 25 MG/1
25 TABLET, EXTENDED RELEASE ORAL DAILY
Qty: 90 TABLET | Refills: 1 | Status: SHIPPED | OUTPATIENT
Start: 2024-08-21 | End: 2025-08-21

## 2024-08-21 RX ORDER — TRAZODONE HYDROCHLORIDE 100 MG/1
200 TABLET ORAL NIGHTLY
COMMUNITY

## 2024-08-21 RX ORDER — LEVOTHYROXINE SODIUM 100 UG/1
100 TABLET ORAL
Qty: 90 TABLET | Refills: 1 | Status: SHIPPED | OUTPATIENT
Start: 2024-08-21 | End: 2025-08-21

## 2024-08-21 RX ORDER — TRAZODONE HYDROCHLORIDE 300 MG/1
300 TABLET ORAL NIGHTLY PRN
COMMUNITY
End: 2024-08-21 | Stop reason: DRUGHIGH

## 2024-08-21 RX ORDER — LISINOPRIL 40 MG/1
40 TABLET ORAL DAILY
Qty: 90 TABLET | Refills: 1 | Status: SHIPPED | OUTPATIENT
Start: 2024-08-21

## 2024-08-21 RX ORDER — ZOLPIDEM TARTRATE 5 MG/1
2.5-5 TABLET ORAL NIGHTLY PRN
COMMUNITY
Start: 2024-08-14

## 2024-08-21 NOTE — PROGRESS NOTES
"New Clinic Note    Nikkie Brown is a 59 y.o. female     CC:   Chief Complaint   Patient presents with    Annual Exam     Patient is here for her routine 6 month check up, refills on prescriptions sent into [facility], and is "fasting" for labs.     Hypertension    Hypothyroidism    Medication Refill        Subjective    History of Present Illness HPI   Patient is for evaluation of chronic medical problems. Patient needs refills. Leona  is tolerating medications well without side effects.       Current Outpatient Medications:     amantadine  mg Tab, Take 1 tablet by mouth 2 (two) times daily., Disp: , Rfl:     amitriptyline (ELAVIL) 75 MG tablet, Take 1 tablet (75 mg total) by mouth nightly., Disp: 90 tablet, Rfl: 1    ARIPiprazole (ABILIFY) 15 MG Tab, Take 1 tablet (15 mg total) by mouth once daily., Disp: 90 tablet, Rfl: 1    busPIRone (BUSPAR) 30 MG Tab, Take 1 tablet (30 mg total) by mouth 2 (two) times daily., Disp: 180 tablet, Rfl: 1    diclofenac sodium (SOLARAZE) 3 % gel, Apply 2 g topically 4 (four) times daily., Disp: , Rfl:     hydrALAZINE (APRESOLINE) 25 MG tablet, Take 1 tablet (25 mg total) by mouth every 8 (eight) hours., Disp: 270 tablet, Rfl: 1    hydroCHLOROthiazide (HYDRODIURIL) 25 MG tablet, Take 1 tablet (25 mg total) by mouth once daily., Disp: 90 tablet, Rfl: 1    hydrOXYzine pamoate (VISTARIL) 50 MG Cap, Take 50 mg by mouth every evening., Disp: , Rfl:     traZODone (DESYREL) 100 MG tablet, Take 200 mg by mouth every evening., Disp: , Rfl:     zolpidem (AMBIEN) 5 MG Tab, Take 2.5-5 mg by mouth nightly as needed., Disp: , Rfl:     levothyroxine (SYNTHROID) 100 MCG tablet, Take 1 tablet (100 mcg total) by mouth before breakfast., Disp: 90 tablet, Rfl: 1    lisinopriL (PRINIVIL,ZESTRIL) 40 MG tablet, Take 1 tablet (40 mg total) by mouth once daily., Disp: 90 tablet, Rfl: 1    metoprolol succinate (TOPROL-XL) 25 MG 24 hr tablet, Take 1 tablet (25 mg total) by mouth once daily., " "Disp: 90 tablet, Rfl: 1     Past Medical History:   Diagnosis Date    Anxiety     Bipolar disorder     Chronic kidney disease, unspecified     Fracture of left ankle, sequela     Hypertension     Hypothyroidism     Polyp of descending colon 05/21/2021    Rectal polyp 05/21/2021    Schizophrenia, unspecified     Screening for malignant neoplasm of colon 05/21/2021        Family History   Problem Relation Name Age of Onset    Hypertension Father Vidal Guillaume     Intellectual disability Father Vidal Guillaume     Cancer Father Vidal Guillaume     Mental illness Father Vidal Guillaume     Hypertension Sister Jessica Cardoso     Intellectual disability Sister Jessica Cardoso     Depression Sister Jessica Cardoso     Stroke Maternal Grandmother Michelle Guillaume     Heart disease Maternal Grandmother Michelle Guillaume     No Known Problems Brother          Past Surgical History:   Procedure Laterality Date     left wrist repair      ANKLE SURGERY Left 2001    broke ankle has a plate in this ankle    APPLICATION OF EXTERNAL FIXATION DEVICE TO UPPER EXTREMITY Left 03/03/2022    Procedure: APPLICATION, EXTERNAL FIXATION DEVICE, SMALL, HAND OR WRIST;  Surgeon: Vidal Hernandez MD;  Location: [facility];  Service: Orthopedics;  Laterality: Left;    FRACTURE SURGERY      TUBAL LIGATION  1997        Review of Systems   Constitutional:  Negative for fatigue and fever.   HENT:  Negative for ear pain, postnasal drip, rhinorrhea and sinus pressure/congestion.    Respiratory:  Negative for cough and shortness of breath.    Cardiovascular:  Negative for chest pain.   Gastrointestinal:  Negative for abdominal pain, diarrhea, nausea and vomiting.   Genitourinary:  Negative for dysuria.   Neurological:  Negative for headaches.        /76 (BP Location: Left arm, Patient Position: Sitting, BP Method: Large (Automatic))   Pulse 75   Temp 98.6 °F (37 °C) (Oral)   Resp 18   Ht 5' 7" (1.702 m)   Wt 98 kg (216 lb)   SpO2 97% "   BMI 33.83 kg/m²      Physical Exam  HENT:      Head: Normocephalic and atraumatic.   Cardiovascular:      Rate and Rhythm: Normal rate and regular rhythm.   Pulmonary:      Effort: Pulmonary effort is normal.      Breath sounds: Normal breath sounds.   Neurological:      Mental Status: She is alert and oriented to person, place, and time.   Psychiatric:         Mood and Affect: Mood normal.         Behavior: Behavior normal.          Assessment and Plan      ICD-10-CM ICD-9-CM   1. Essential hypertension  I10 401.9   2. Acquired hypothyroidism  E03.9 244.9   3. Hyperparathyroidism  E21.3 252.00        1. Essential hypertension  The current medical regimen is effective;  continue present plan and medications.  -     lisinopriL (PRINIVIL,ZESTRIL) 40 MG tablet; Take 1 tablet (40 mg total) by mouth once daily.  Dispense: 90 tablet; Refill: 1  -     metoprolol succinate (TOPROL-XL) 25 MG 24 hr tablet; Take 1 tablet (25 mg total) by mouth once daily.  Dispense: 90 tablet; Refill: 1    2. Acquired hypothyroidism  The current medical regimen is effective;  continue present plan and medications.  -     levothyroxine (SYNTHROID) 100 MCG tablet; Take 1 tablet (100 mcg total) by mouth before breakfast.  Dispense: 90 tablet; Refill: 1  -     TSH; Future; Expected date: 08/21/2024    3. Hyperparathyroidism  Stable.         Follow up in about 6 months (around 2/21/2025).

## 2024-08-27 ENCOUNTER — HOSPITAL ENCOUNTER (OUTPATIENT)
Dept: RADIOLOGY | Facility: HOSPITAL | Age: 59
Discharge: HOME | End: 2024-08-27
Attending: FAMILY MEDICINE
Payer: MEDICARE

## 2024-08-27 VITALS — HEIGHT: 67 IN | WEIGHT: 215 LBS | BODY MASS INDEX: 33.74 KG/M2

## 2024-08-27 DIAGNOSIS — Z12.31 BREAST CANCER SCREENING BY MAMMOGRAM: ICD-10-CM

## 2024-08-27 PROCEDURE — 77067 SCR MAMMO BI INCL CAD: CPT | Mod: TC

## 2024-09-07 DIAGNOSIS — I10 ESSENTIAL HYPERTENSION: ICD-10-CM

## 2024-09-09 RX ORDER — HYDRALAZINE HYDROCHLORIDE 25 MG/1
25 TABLET, FILM COATED ORAL EVERY 8 HOURS
Qty: 270 TABLET | Refills: 0 | Status: SHIPPED | OUTPATIENT
Start: 2024-09-09

## 2024-11-17 ENCOUNTER — PATIENT MESSAGE (OUTPATIENT)
Dept: OTHER | Facility: OTHER | Age: 59
End: 2024-11-17

## 2024-11-18 ENCOUNTER — PATIENT MESSAGE (OUTPATIENT)
Dept: OTHER | Facility: OTHER | Age: 59
End: 2024-11-18

## 2024-12-02 ENCOUNTER — OFFICE VISIT (OUTPATIENT)
Dept: FAMILY MEDICINE | Facility: CLINIC | Age: 59
End: 2024-12-02
Payer: MEDICARE

## 2024-12-02 VITALS
OXYGEN SATURATION: 98 % | TEMPERATURE: 99 F | SYSTOLIC BLOOD PRESSURE: 152 MMHG | HEIGHT: 67 IN | RESPIRATION RATE: 18 BRPM | WEIGHT: 220.19 LBS | HEART RATE: 78 BPM | DIASTOLIC BLOOD PRESSURE: 88 MMHG | BODY MASS INDEX: 34.56 KG/M2

## 2024-12-02 DIAGNOSIS — I10 ESSENTIAL HYPERTENSION: Primary | Chronic | ICD-10-CM

## 2024-12-02 DIAGNOSIS — Z23 ENCOUNTER FOR IMMUNIZATION: ICD-10-CM

## 2024-12-02 PROCEDURE — 3079F DIAST BP 80-89 MM HG: CPT | Mod: ,,, | Performed by: FAMILY MEDICINE

## 2024-12-02 PROCEDURE — 1159F MED LIST DOCD IN RCRD: CPT | Mod: ,,, | Performed by: FAMILY MEDICINE

## 2024-12-02 PROCEDURE — 3066F NEPHROPATHY DOC TX: CPT | Mod: ,,, | Performed by: FAMILY MEDICINE

## 2024-12-02 PROCEDURE — 4010F ACE/ARB THERAPY RXD/TAKEN: CPT | Mod: ,,, | Performed by: FAMILY MEDICINE

## 2024-12-02 PROCEDURE — 99214 OFFICE O/P EST MOD 30 MIN: CPT | Mod: 25,,, | Performed by: FAMILY MEDICINE

## 2024-12-02 PROCEDURE — 1160F RVW MEDS BY RX/DR IN RCRD: CPT | Mod: ,,, | Performed by: FAMILY MEDICINE

## 2024-12-02 PROCEDURE — 3061F NEG MICROALBUMINURIA REV: CPT | Mod: ,,, | Performed by: FAMILY MEDICINE

## 2024-12-02 PROCEDURE — 3077F SYST BP >= 140 MM HG: CPT | Mod: ,,, | Performed by: FAMILY MEDICINE

## 2024-12-02 PROCEDURE — 3008F BODY MASS INDEX DOCD: CPT | Mod: ,,, | Performed by: FAMILY MEDICINE

## 2024-12-02 PROCEDURE — 90656 IIV3 VACC NO PRSV 0.5 ML IM: CPT | Mod: ,,, | Performed by: FAMILY MEDICINE

## 2024-12-02 PROCEDURE — G0008 ADMIN INFLUENZA VIRUS VAC: HCPCS | Mod: ,,, | Performed by: FAMILY MEDICINE

## 2024-12-02 RX ORDER — TRAZODONE HYDROCHLORIDE 150 MG/1
300 TABLET ORAL NIGHTLY PRN
COMMUNITY
Start: 2024-11-18

## 2024-12-02 RX ORDER — AMLODIPINE BESYLATE 5 MG/1
5 TABLET ORAL DAILY
Qty: 30 TABLET | Refills: 5 | Status: SHIPPED | OUTPATIENT
Start: 2024-12-02 | End: 2025-12-02

## 2024-12-02 NOTE — PROGRESS NOTES
New Clinic Note    Nikkie Brown is a 59 y.o. female     CC:   Chief Complaint   Patient presents with    Hypertension     Patient states her BP has been elevated at home. She said it has been 150/160 / 100. This morning it was 182/108. Yesterday  160/95 and 168/100. On 11/30 it was 145/87 and 155/100.         Subjective    History of Present Illness HPI   Patient reports that her blood pressure has been elevated for several months. She reports that she is taking all of her medications. She complains of a headache.     Current Outpatient Medications:     amantadine  mg Tab, Take 1 tablet by mouth 2 (two) times daily., Disp: , Rfl:     amitriptyline (ELAVIL) 75 MG tablet, Take 1 tablet (75 mg total) by mouth nightly., Disp: 90 tablet, Rfl: 1    ARIPiprazole (ABILIFY) 15 MG Tab, Take 1 tablet (15 mg total) by mouth once daily., Disp: 90 tablet, Rfl: 1    busPIRone (BUSPAR) 30 MG Tab, Take 1 tablet (30 mg total) by mouth 2 (two) times daily., Disp: 180 tablet, Rfl: 1    hydroCHLOROthiazide (HYDRODIURIL) 25 MG tablet, Take 1 tablet (25 mg total) by mouth once daily., Disp: 90 tablet, Rfl: 1    hydrOXYzine pamoate (VISTARIL) 50 MG Cap, Take 50 mg by mouth every evening., Disp: , Rfl:     levothyroxine (SYNTHROID) 100 MCG tablet, Take 1 tablet (100 mcg total) by mouth before breakfast., Disp: 90 tablet, Rfl: 1    lisinopriL (PRINIVIL,ZESTRIL) 40 MG tablet, Take 1 tablet (40 mg total) by mouth once daily., Disp: 90 tablet, Rfl: 1    metoprolol succinate (TOPROL-XL) 25 MG 24 hr tablet, Take 1 tablet (25 mg total) by mouth once daily., Disp: 90 tablet, Rfl: 1    traZODone (DESYREL) 150 MG tablet, Take 300 mg by mouth nightly as needed., Disp: , Rfl:     zolpidem (AMBIEN) 5 MG Tab, Take 2.5-5 mg by mouth nightly as needed., Disp: , Rfl:     amLODIPine (NORVASC) 5 MG tablet, Take 1 tablet (5 mg total) by mouth once daily., Disp: 30 tablet, Rfl: 5    hydrALAZINE (APRESOLINE) 25 MG tablet, TAKE 1 TABLET BY MOUTH EVERY  "8 HOURS, Disp: 270 tablet, Rfl: 0    traZODone (DESYREL) 100 MG tablet, Take 200 mg by mouth every evening., Disp: , Rfl:   No current facility-administered medications for this visit.     Past Medical History:   Diagnosis Date    Anxiety     Bipolar disorder     Chronic kidney disease, unspecified     Fracture of left ankle, sequela     Hypertension     Hypothyroidism     Polyp of descending colon 05/21/2021    Rectal polyp 05/21/2021    Schizophrenia, unspecified     Screening for malignant neoplasm of colon 05/21/2021        Family History   Problem Relation Name Age of Onset    Hypertension Father Vidal Guillaume     Intellectual disability Father Vidal Guillaume     Cancer Father Vidal Guillaume     Mental illness Father Vidal Guillaume     Hypertension Sister Jessica Cardoso     Intellectual disability Sister Jessica Cardoso     Depression Sister Jessica Cardoso     Stroke Maternal Grandmother Michelle Guillaume     Heart disease Maternal Grandmother Michelle Guillaume     No Known Problems Brother          Past Surgical History:   Procedure Laterality Date     left wrist repair      ANKLE SURGERY Left 2001    broke ankle has a plate in this ankle    APPLICATION OF EXTERNAL FIXATION DEVICE TO UPPER EXTREMITY Left 03/03/2022    Procedure: APPLICATION, EXTERNAL FIXATION DEVICE, SMALL, HAND OR WRIST;  Surgeon: Vidal Hernandez MD;  Location: [facility];  Service: Orthopedics;  Laterality: Left;    FRACTURE SURGERY      TUBAL LIGATION  1997        Review of Systems   Constitutional:  Negative for fatigue and fever.   HENT:  Negative for ear pain, postnasal drip, rhinorrhea and sinus pressure/congestion.    Respiratory:  Negative for cough.    Gastrointestinal:  Negative for abdominal pain, diarrhea, nausea and vomiting.   Genitourinary:  Negative for dysuria.        BP (!) 152/88 (BP Location: Right arm, Patient Position: Sitting)   Pulse 78   Temp 98.7 °F (37.1 °C) (Oral)   Resp 18   Ht 5' 7" (1.702 m)   " Wt 99.9 kg (220 lb 3.2 oz)   SpO2 98%   BMI 34.49 kg/m²      Physical Exam  HENT:      Head: Normocephalic and atraumatic.   Cardiovascular:      Rate and Rhythm: Normal rate and regular rhythm.   Pulmonary:      Effort: Pulmonary effort is normal.      Breath sounds: Normal breath sounds.   Neurological:      Mental Status: She is alert and oriented to person, place, and time.   Psychiatric:         Mood and Affect: Mood normal.         Behavior: Behavior normal.          Assessment and Plan      ICD-10-CM ICD-9-CM   1. Essential hypertension  I10 401.9   2. Encounter for immunization  Z23 V03.89        1. Essential hypertension  Not controlled. Add amlodipine 5mg. Follow up in 1 week.   -     amLODIPine (NORVASC) 5 MG tablet; Take 1 tablet (5 mg total) by mouth once daily.  Dispense: 30 tablet; Refill: 5    2. Encounter for immunization  -     influenza (Flulaval, Fluzone, Fluarix) 45 mcg/0.5 mL IM vaccine (> or = 6 mo) 0.5 mL       Follow up in about 1 week (around 12/9/2024).

## 2024-12-09 ENCOUNTER — OFFICE VISIT (OUTPATIENT)
Dept: FAMILY MEDICINE | Facility: CLINIC | Age: 59
End: 2024-12-09
Payer: MEDICARE

## 2024-12-09 VITALS
OXYGEN SATURATION: 98 % | TEMPERATURE: 98 F | DIASTOLIC BLOOD PRESSURE: 81 MMHG | HEART RATE: 81 BPM | WEIGHT: 220 LBS | BODY MASS INDEX: 34.53 KG/M2 | SYSTOLIC BLOOD PRESSURE: 129 MMHG | HEIGHT: 67 IN | RESPIRATION RATE: 18 BRPM

## 2024-12-09 DIAGNOSIS — G47.00 INSOMNIA, UNSPECIFIED TYPE: Chronic | ICD-10-CM

## 2024-12-09 DIAGNOSIS — I10 ESSENTIAL HYPERTENSION: Primary | Chronic | ICD-10-CM

## 2024-12-09 PROCEDURE — 3066F NEPHROPATHY DOC TX: CPT | Mod: ,,, | Performed by: FAMILY MEDICINE

## 2024-12-09 PROCEDURE — 3061F NEG MICROALBUMINURIA REV: CPT | Mod: ,,, | Performed by: FAMILY MEDICINE

## 2024-12-09 PROCEDURE — 1159F MED LIST DOCD IN RCRD: CPT | Mod: ,,, | Performed by: FAMILY MEDICINE

## 2024-12-09 PROCEDURE — 99214 OFFICE O/P EST MOD 30 MIN: CPT | Mod: ,,, | Performed by: FAMILY MEDICINE

## 2024-12-09 PROCEDURE — 3008F BODY MASS INDEX DOCD: CPT | Mod: ,,, | Performed by: FAMILY MEDICINE

## 2024-12-09 PROCEDURE — 3079F DIAST BP 80-89 MM HG: CPT | Mod: ,,, | Performed by: FAMILY MEDICINE

## 2024-12-09 PROCEDURE — 1160F RVW MEDS BY RX/DR IN RCRD: CPT | Mod: ,,, | Performed by: FAMILY MEDICINE

## 2024-12-09 PROCEDURE — 4010F ACE/ARB THERAPY RXD/TAKEN: CPT | Mod: ,,, | Performed by: FAMILY MEDICINE

## 2024-12-09 PROCEDURE — 3074F SYST BP LT 130 MM HG: CPT | Mod: ,,, | Performed by: FAMILY MEDICINE

## 2024-12-09 RX ORDER — ONDANSETRON 4 MG/1
4 TABLET, ORALLY DISINTEGRATING ORAL EVERY 4 HOURS PRN
COMMUNITY
Start: 2024-12-05

## 2024-12-09 NOTE — PROGRESS NOTES
New Clinic Note    Nikkie Brown is a 59 y.o. female     CC:   Chief Complaint   Patient presents with    Hypertension    Follow-up     Patient is here for one week follow up on HTN. Stated she is tolerating the increase in Trazadone to 300 mg at HS and resting better and we added Amlodipine 5 mg. No side effects from wither medication. Blood pressure she stated had been much better.         Subjective    History of Present Illness HPI   Patient is here to follow up after medication changes. She is tolerating her medications.     Current Outpatient Medications:     amantadine  mg Tab, Take 1 tablet by mouth 2 (two) times daily., Disp: , Rfl:     amitriptyline (ELAVIL) 75 MG tablet, Take 1 tablet (75 mg total) by mouth nightly., Disp: 90 tablet, Rfl: 1    amLODIPine (NORVASC) 5 MG tablet, Take 1 tablet (5 mg total) by mouth once daily., Disp: 30 tablet, Rfl: 5    ARIPiprazole (ABILIFY) 15 MG Tab, Take 1 tablet (15 mg total) by mouth once daily., Disp: 90 tablet, Rfl: 1    busPIRone (BUSPAR) 30 MG Tab, Take 1 tablet (30 mg total) by mouth 2 (two) times daily., Disp: 180 tablet, Rfl: 1    hydrALAZINE (APRESOLINE) 25 MG tablet, TAKE 1 TABLET BY MOUTH EVERY 8 HOURS, Disp: 270 tablet, Rfl: 0    hydroCHLOROthiazide (HYDRODIURIL) 25 MG tablet, Take 1 tablet (25 mg total) by mouth once daily., Disp: 90 tablet, Rfl: 1    hydrOXYzine pamoate (VISTARIL) 50 MG Cap, Take 50 mg by mouth every evening., Disp: , Rfl:     levothyroxine (SYNTHROID) 100 MCG tablet, Take 1 tablet (100 mcg total) by mouth before breakfast., Disp: 90 tablet, Rfl: 1    lisinopriL (PRINIVIL,ZESTRIL) 40 MG tablet, Take 1 tablet (40 mg total) by mouth once daily., Disp: 90 tablet, Rfl: 1    metoprolol succinate (TOPROL-XL) 25 MG 24 hr tablet, Take 1 tablet (25 mg total) by mouth once daily., Disp: 90 tablet, Rfl: 1    ondansetron (ZOFRAN-ODT) 4 MG TbDL, Take 4 mg by mouth every 4 (four) hours as needed., Disp: , Rfl:     traZODone (DESYREL) 150 MG  tablet, Take 300 mg by mouth nightly as needed., Disp: , Rfl:     zolpidem (AMBIEN) 5 MG Tab, Take 2.5-5 mg by mouth nightly as needed., Disp: , Rfl:      Past Medical History:   Diagnosis Date    Anxiety     Bipolar disorder     Chronic kidney disease, unspecified     Fracture of left ankle, sequela     Hypertension     Hypothyroidism     Polyp of descending colon 05/21/2021    Rectal polyp 05/21/2021    Schizophrenia, unspecified     Screening for malignant neoplasm of colon 05/21/2021        Family History   Problem Relation Name Age of Onset    Hypertension Father Vidal Guillaume     Intellectual disability Father Vidal Guillaume     Cancer Father Vidal Guillaume     Mental illness Father Vidal Guillaume     Hypertension Sister Jessica Cardoso     Intellectual disability Sister Jessica Cardoso     Depression Sister Jessica Cardoso     Stroke Maternal Grandmother Michelle Guillaume     Heart disease Maternal Grandmother Michelle Guillaume     No Known Problems Brother          Past Surgical History:   Procedure Laterality Date     left wrist repair      ANKLE SURGERY Left 2001    broke ankle has a plate in this ankle    APPLICATION OF EXTERNAL FIXATION DEVICE TO UPPER EXTREMITY Left 03/03/2022    Procedure: APPLICATION, EXTERNAL FIXATION DEVICE, SMALL, HAND OR WRIST;  Surgeon: Vidal Hernandez MD;  Location: [facility];  Service: Orthopedics;  Laterality: Left;    FRACTURE SURGERY      TUBAL LIGATION  1997        Review of Systems   Constitutional:  Negative for fatigue and fever.   HENT:  Negative for ear pain, postnasal drip, rhinorrhea and sinus pressure/congestion.    Respiratory:  Negative for cough and shortness of breath.    Cardiovascular:  Negative for chest pain.   Gastrointestinal:  Negative for abdominal pain, diarrhea, nausea and vomiting.   Genitourinary:  Negative for dysuria.   Neurological:  Negative for headaches.        /81 (BP Location: Left arm, Patient Position: Sitting)   Pulse 81  "  Temp 98.1 °F (36.7 °C) (Oral)   Resp 18   Ht 5' 7" (1.702 m)   Wt 99.8 kg (220 lb)   SpO2 98%   BMI 34.46 kg/m²      Physical Exam  HENT:      Head: Normocephalic and atraumatic.   Cardiovascular:      Rate and Rhythm: Normal rate and regular rhythm.   Pulmonary:      Effort: Pulmonary effort is normal.      Breath sounds: Normal breath sounds.   Neurological:      Mental Status: She is alert and oriented to person, place, and time.   Psychiatric:         Mood and Affect: Mood normal.         Behavior: Behavior normal.          Assessment and Plan      ICD-10-CM ICD-9-CM   1. Essential hypertension  I10 401.9   2. Insomnia, unspecified type  G47.00 780.52        1. Essential hypertension  The current medical regimen is effective;  continue present plan and medications.    2. Insomnia, unspecified type  The current medical regimen is effective;  continue present plan and medications.          Follow up in about 3 months (around 3/9/2025).       "

## 2024-12-16 ENCOUNTER — PATIENT MESSAGE (OUTPATIENT)
Dept: ADMINISTRATIVE | Facility: OTHER | Age: 59
End: 2024-12-16

## 2025-01-03 ENCOUNTER — OFFICE VISIT (OUTPATIENT)
Dept: NEPHROLOGY | Facility: CLINIC | Age: 60
End: 2025-01-03
Payer: MEDICARE

## 2025-01-03 VITALS
HEART RATE: 77 BPM | WEIGHT: 219.81 LBS | HEIGHT: 67 IN | BODY MASS INDEX: 34.5 KG/M2 | OXYGEN SATURATION: 99 % | SYSTOLIC BLOOD PRESSURE: 150 MMHG | DIASTOLIC BLOOD PRESSURE: 90 MMHG

## 2025-01-03 DIAGNOSIS — N18.32 STAGE 3B CHRONIC KIDNEY DISEASE: Primary | ICD-10-CM

## 2025-01-03 DIAGNOSIS — I12.9 HYPERTENSIVE NEPHROSCLEROSIS, STAGE 1 THROUGH STAGE 4 OR UNSPECIFIED CHRONIC KIDNEY DISEASE: ICD-10-CM

## 2025-01-03 PROCEDURE — 3077F SYST BP >= 140 MM HG: CPT | Mod: CPTII,,, | Performed by: NURSE PRACTITIONER

## 2025-01-03 PROCEDURE — 3066F NEPHROPATHY DOC TX: CPT | Mod: CPTII,,, | Performed by: NURSE PRACTITIONER

## 2025-01-03 PROCEDURE — 1159F MED LIST DOCD IN RCRD: CPT | Mod: CPTII,,, | Performed by: NURSE PRACTITIONER

## 2025-01-03 PROCEDURE — 99213 OFFICE O/P EST LOW 20 MIN: CPT | Mod: S$PBB,,, | Performed by: NURSE PRACTITIONER

## 2025-01-03 PROCEDURE — 3008F BODY MASS INDEX DOCD: CPT | Mod: CPTII,,, | Performed by: NURSE PRACTITIONER

## 2025-01-03 PROCEDURE — 99999 PR PBB SHADOW E&M-EST. PATIENT-LVL IV: CPT | Mod: PBBFAC,,, | Performed by: NURSE PRACTITIONER

## 2025-01-03 PROCEDURE — 3080F DIAST BP >= 90 MM HG: CPT | Mod: CPTII,,, | Performed by: NURSE PRACTITIONER

## 2025-01-03 PROCEDURE — 99214 OFFICE O/P EST MOD 30 MIN: CPT | Mod: PBBFAC | Performed by: NURSE PRACTITIONER

## 2025-01-03 NOTE — PROGRESS NOTES
[facility] History and Physical  Patient Name: Nikkie Brown  MRN: [id number]  Age: 59 y.o.  : 1965    Date: 1/3/2025 1:59 PM    Chief Complaint: Follow-up (6 month f/u/Pt states she is having bladder issues. Has to go all the time and sometimes she does not make it in time for the bathroom.)     HPI :   Ms Brown presents to Nephrology clinic for routine follow up. She is followed by Alejandra Vann MD as her primary care provider.     HTN: diagnosed 19 years old. Current regimen includes amlodipine 5 mg daily, hydralazine 25 mg TID, hydrochlorothiazide 25 mg daily, lisinopril 40 mg daily, toprol 25 mg daily.     Schizophrenia: follows with Emeka. On Abilify, buspar, Ambien    Hypothyroidism: Levothyroxine 100 mcg daily    Nephrology history: No FH of kidney disease, no nephrolithiasis, or recurrent UTIs. No OTC medications including NSAIDs. Used to take naproxen daily for pain. No longer doing so.     Patient denies any CP, SOB, peripheral edema, dysuria, hematuria, changes in urinary habits, or increased frequency of urination.     Past Medical History:  has a past medical history of Anxiety, Bipolar disorder, Chronic kidney disease, unspecified, Fracture of left ankle, sequela, Hypertension, Hypothyroidism, Polyp of descending colon (2021), Rectal polyp (2021), Schizophrenia, unspecified, and Screening for malignant neoplasm of colon (2021).     Past Surgical History:   has a past surgical history that includes Tubal ligation (); Ankle surgery (Left, ); Application of external fixation device to upper extremity (Left, 2022); Fracture surgery; and  left wrist repair.     Family History:  family history includes Cancer in her father; Depression in her sister; Heart disease in her maternal grandmother; Hypertension in her father and sister; Intellectual disability in her father and sister; Mental illness in her father; No  Known Problems in her brother; Stroke in her maternal grandmother. No family history of kidney disease.     Social History:   reports that she has been smoking cigarettes. She started smoking about 26 years ago. She has a 26.9 pack-year smoking history. She has been exposed to tobacco smoke. She has never used smokeless tobacco. She reports that she does not currently use alcohol. She reports current drug use. Frequency: 3.00 times per week. Drug: Marijuana.     Allergies: has No Known Allergies.     Medications: Reviewed including OTC medications, herbal supplements, and NSAIDS.     Old records have been reviewed.      Review of Systems:  ROS: A 10 point ROS was completed and found to be negative except for that mentioned above.          Physical Exam:  Vitals:    01/03/25 0813   BP: (!) 150/90   Pulse: 77     Did not take any BP meds today    Constitutional: sitting in chair, in NAD  Eyes: EOMI, white sclera  ENMT: moist mucus membranes, nares patent  Cardiovascular: normal rate, S1/S2 noted, no edema  Respiratory: symmetrical chest expansion, CTA-B  Gastrointestinal: +BS, soft, NT/ND  Musculoskeletal: normal, no joint erythema/effusions  Skin: no rash, no purpura, warm extremities  Neurological: Alert and Oriented x 3, afocal    Labs:   Lab Results   Component Value Date     07/19/2024    K 3.7 07/19/2024    CREATININE 1.70 (H) 07/19/2024    ALT 29 07/19/2024    AST 14 (L) 07/19/2024    LDLCALC 97 02/21/2024    CHOL 160 02/21/2024    HDL 30 (L) 02/21/2024    TRIG 165 (H) 02/21/2024    TSH 0.426 08/21/2024    WBC 10.99 07/19/2024    HGB 13.0 07/19/2024    HCT 40.6 07/19/2024     07/19/2024        Otherwise Reviewed    Assessment/Plan:     CKD stage IIIb in setting of hypertensive nephrosclerosis, NSAID use. Baseline sCr 1.5-1.7, today sCr pending. Counseled to avoid nephrotoxic agents such as NSAIDs. She is on ACEi which will help with CKD, HTN.     Proteinuria: urine Prot:Creat ratio is pending.  Patient is on RAAS blockade with ACEi    Anemia: CBC pending    BMD: Calcium and Phosphorus PTH, Vit D pending    HTN: well controlled with current meds usually, did not take BP meds this morning.     RTC 6 months with CBC, RFP, UA, urine for Prot:creat ratio, PTH, Vit D      Signature:             FEDE James  [facility]  [address] [facility] [address]  [phone]        20 minutes of total time spent on the encounter, which includes face to face time and non-face to face time preparing to see the patient (eg, review of tests), Obtaining and/or reviewing separately obtained history, Documenting clinical information in the electronic or other health record, Independently interpreting results (not separately reported) and communicating results to the patient/family/caregiver, or Care coordination (not separately reported).       Date of encounter: 1/3/25

## 2025-01-21 NOTE — PROGRESS NOTES
"Nikkie Brown presented for a follow-up Medicare AWV today. The following components were reviewed and updated:    Medical history  Family History  Social history  Allergies and Current Medications  Health Risk Assessment  Health Maintenance  Care Team    **See Completed Assessments for Annual Wellness visit with in the encounter summary    The following assessments were completed:  Depression Screening  Cognitive function Screening  Timed Get Up Test  Whisper Test      Opioid documentation:      Patient does not have a current opioid prescription.          Vitals:    01/22/25 1419   BP: 110/70   Pulse: 81   Resp: 14   Temp: 98.4 °F (36.9 °C)   SpO2: 96%   Weight: 97.3 kg (214 lb 6.4 oz)   Height: 5' 7" (1.702 m)     Body mass index is 33.58 kg/m².       Physical Exam  Constitutional:       Appearance: Normal appearance.   HENT:      Head: Normocephalic and atraumatic.   Cardiovascular:      Rate and Rhythm: Normal rate and regular rhythm.   Pulmonary:      Effort: Pulmonary effort is normal.      Breath sounds: Normal breath sounds.   Neurological:      General: No focal deficit present.      Mental Status: She is alert.   Psychiatric:         Mood and Affect: Mood normal.         Behavior: Behavior normal.           Diagnoses and health risks identified today and associated recommendations/orders:  1. Encounter for initial annual wellness visit (AWV) in Medicare patient    2. Essential hypertension  The current medical regimen is effective;  continue present plan and medications.  - Comprehensive Metabolic Panel; Future  - CBC Auto Differential; Future  - Lipid Panel; Future  - Comprehensive Metabolic Panel  - CBC Auto Differential  - Lipid Panel  - amLODIPine (NORVASC) 5 MG tablet; Take 1 tablet (5 mg total) by mouth once daily.  Dispense: 90 tablet; Refill: 1  - hydrALAZINE (APRESOLINE) 25 MG tablet; Take 1 tablet (25 mg total) by mouth every 8 (eight) hours.  Dispense: 270 tablet; Refill: 1  - " hydroCHLOROthiazide (HYDRODIURIL) 25 MG tablet; Take 1 tablet (25 mg total) by mouth once daily.  Dispense: 90 tablet; Refill: 1  - lisinopriL (PRINIVIL,ZESTRIL) 40 MG tablet; Take 1 tablet (40 mg total) by mouth once daily.  Dispense: 90 tablet; Refill: 1  - metoprolol succinate (TOPROL-XL) 25 MG 24 hr tablet; Take 1 tablet (25 mg total) by mouth once daily.  Dispense: 90 tablet; Refill: 1    3. Schizophrenia, unspecified type  The current medical regimen is effective;  continue present plan and medications.    4. Stage 3b chronic kidney disease  Stable    5. Acquired hypothyroidism  The current medical regimen is effective;  continue present plan and medications.  - levothyroxine (SYNTHROID) 100 MCG tablet; Take 1 tablet (100 mcg total) by mouth before breakfast.  Dispense: 90 tablet; Refill: 1    6. Anxiety  The current medical regimen is effective;  continue present plan and medications.    7. Hyperparathyroidism  Stable    8. BMI 33.0-33.9,adult  Diet and educational handouts given.    9. Gout, unspecified cause, unspecified chronicity, unspecified site  The current medical regimen is effective;  continue present plan and medications.    10. Insomnia, unspecified type  The current medical regimen is effective;  continue present plan and medications.    11. Cigarette nicotine dependence without complication  Patient is not ready to quit. Educational handouts given.       Provided Nikkie with a 5-10 year written screening schedule and personal prevention plan. Recommendations were developed using the USPSTF age appropriate recommendations. Education, counseling, and referrals were provided as needed.  After Visit Summary printed and given to patient which includes a list of additional screenings\tests needed.    Follow up for 1 year for Annual Wellness Visit.      Alejandra Vann MD

## 2025-01-22 ENCOUNTER — OFFICE VISIT (OUTPATIENT)
Dept: FAMILY MEDICINE | Facility: CLINIC | Age: 60
End: 2025-01-22
Payer: MEDICARE

## 2025-01-22 VITALS
HEIGHT: 67 IN | BODY MASS INDEX: 33.65 KG/M2 | TEMPERATURE: 98 F | SYSTOLIC BLOOD PRESSURE: 110 MMHG | RESPIRATION RATE: 14 BRPM | OXYGEN SATURATION: 96 % | WEIGHT: 214.38 LBS | HEART RATE: 81 BPM | DIASTOLIC BLOOD PRESSURE: 70 MMHG

## 2025-01-22 DIAGNOSIS — Z00.00 ENCOUNTER FOR INITIAL ANNUAL WELLNESS VISIT (AWV) IN MEDICARE PATIENT: Primary | ICD-10-CM

## 2025-01-22 DIAGNOSIS — E03.9 ACQUIRED HYPOTHYROIDISM: ICD-10-CM

## 2025-01-22 DIAGNOSIS — F17.210 CIGARETTE NICOTINE DEPENDENCE WITHOUT COMPLICATION: Chronic | ICD-10-CM

## 2025-01-22 DIAGNOSIS — G47.00 INSOMNIA, UNSPECIFIED TYPE: Chronic | ICD-10-CM

## 2025-01-22 DIAGNOSIS — N18.32 STAGE 3B CHRONIC KIDNEY DISEASE: ICD-10-CM

## 2025-01-22 DIAGNOSIS — F20.9 SCHIZOPHRENIA, UNSPECIFIED TYPE: ICD-10-CM

## 2025-01-22 DIAGNOSIS — M10.9 GOUT, UNSPECIFIED CAUSE, UNSPECIFIED CHRONICITY, UNSPECIFIED SITE: ICD-10-CM

## 2025-01-22 DIAGNOSIS — E21.3 HYPERPARATHYROIDISM: ICD-10-CM

## 2025-01-22 DIAGNOSIS — I10 ESSENTIAL HYPERTENSION: ICD-10-CM

## 2025-01-22 DIAGNOSIS — F41.9 ANXIETY: Chronic | ICD-10-CM

## 2025-01-22 PROBLEM — E66.01 SEVERE OBESITY (BMI 35.0-39.9) WITH COMORBIDITY: Chronic | Status: RESOLVED | Noted: 2022-03-22 | Resolved: 2025-01-22

## 2025-01-22 LAB
ALBUMIN SERPL BCP-MCNC: 4.2 G/DL (ref 3.5–5)
ALBUMIN/GLOB SERPL: 1.1 {RATIO}
ALP SERPL-CCNC: 83 U/L (ref 40–150)
ALT SERPL W P-5'-P-CCNC: 10 U/L
ANION GAP SERPL CALCULATED.3IONS-SCNC: 15 MMOL/L (ref 7–16)
AST SERPL W P-5'-P-CCNC: 16 U/L (ref 5–34)
BASOPHILS # BLD AUTO: 0.05 K/UL (ref 0–0.2)
BASOPHILS NFR BLD AUTO: 0.5 % (ref 0–1)
BILIRUB SERPL-MCNC: 0.2 MG/DL
BUN SERPL-MCNC: 19 MG/DL (ref 10–20)
BUN/CREAT SERPL: 9 (ref 6–20)
CALCIUM SERPL-MCNC: 9.7 MG/DL (ref 8.4–10.2)
CHLORIDE SERPL-SCNC: 101 MMOL/L (ref 98–107)
CHOLEST SERPL-MCNC: 163 MG/DL
CHOLEST/HDLC SERPL: 5.1 {RATIO}
CO2 SERPL-SCNC: 26 MMOL/L (ref 22–29)
CREAT SERPL-MCNC: 2.06 MG/DL (ref 0.55–1.02)
DIFFERENTIAL METHOD BLD: ABNORMAL
EGFR (NO RACE VARIABLE) (RUSH/TITUS): 28 ML/MIN/1.73M2
EOSINOPHIL # BLD AUTO: 0.25 K/UL (ref 0–0.5)
EOSINOPHIL NFR BLD AUTO: 2.5 % (ref 1–4)
ERYTHROCYTE [DISTWIDTH] IN BLOOD BY AUTOMATED COUNT: 14.6 % (ref 11.5–14.5)
GLOBULIN SER-MCNC: 4 G/DL (ref 2–4)
GLUCOSE SERPL-MCNC: 87 MG/DL (ref 74–100)
HCT VFR BLD AUTO: 41.1 % (ref 38–47)
HDLC SERPL-MCNC: 32 MG/DL (ref 35–60)
HGB BLD-MCNC: 13.4 G/DL (ref 12–16)
IMM GRANULOCYTES # BLD AUTO: 0.03 K/UL (ref 0–0.04)
IMM GRANULOCYTES NFR BLD: 0.3 % (ref 0–0.4)
LDLC SERPL CALC-MCNC: 86 MG/DL
LDLC/HDLC SERPL: 2.7 {RATIO}
LYMPHOCYTES # BLD AUTO: 4.81 K/UL (ref 1–4.8)
LYMPHOCYTES NFR BLD AUTO: 47.5 % (ref 27–41)
MCH RBC QN AUTO: 28.2 PG (ref 27–31)
MCHC RBC AUTO-ENTMCNC: 32.6 G/DL (ref 32–36)
MCV RBC AUTO: 86.3 FL (ref 80–96)
MONOCYTES # BLD AUTO: 0.54 K/UL (ref 0–0.8)
MONOCYTES NFR BLD AUTO: 5.3 % (ref 2–6)
MPC BLD CALC-MCNC: 9.6 FL (ref 9.4–12.4)
NEUTROPHILS # BLD AUTO: 4.44 K/UL (ref 1.8–7.7)
NEUTROPHILS NFR BLD AUTO: 43.9 % (ref 53–65)
NONHDLC SERPL-MCNC: 131 MG/DL
NRBC # BLD AUTO: 0 X10E3/UL
NRBC, AUTO (.00): 0 %
PLATELET # BLD AUTO: 409 K/UL (ref 150–400)
POTASSIUM SERPL-SCNC: 3.8 MMOL/L (ref 3.5–5.1)
PROT SERPL-MCNC: 8.2 G/DL (ref 6.4–8.3)
RBC # BLD AUTO: 4.76 M/UL (ref 4.2–5.4)
SODIUM SERPL-SCNC: 138 MMOL/L (ref 136–145)
TRIGL SERPL-MCNC: 224 MG/DL (ref 37–140)
VLDLC SERPL-MCNC: 45 MG/DL
WBC # BLD AUTO: 10.12 K/UL (ref 4.5–11)

## 2025-01-22 PROCEDURE — 85025 COMPLETE CBC W/AUTO DIFF WBC: CPT | Mod: ,,, | Performed by: CLINICAL MEDICAL LABORATORY

## 2025-01-22 PROCEDURE — 3078F DIAST BP <80 MM HG: CPT | Mod: ,,, | Performed by: FAMILY MEDICINE

## 2025-01-22 PROCEDURE — 1159F MED LIST DOCD IN RCRD: CPT | Mod: ,,, | Performed by: FAMILY MEDICINE

## 2025-01-22 PROCEDURE — 80061 LIPID PANEL: CPT | Mod: ,,, | Performed by: CLINICAL MEDICAL LABORATORY

## 2025-01-22 PROCEDURE — 80053 COMPREHEN METABOLIC PANEL: CPT | Mod: ,,, | Performed by: CLINICAL MEDICAL LABORATORY

## 2025-01-22 PROCEDURE — 3061F NEG MICROALBUMINURIA REV: CPT | Mod: ,,, | Performed by: FAMILY MEDICINE

## 2025-01-22 PROCEDURE — 3066F NEPHROPATHY DOC TX: CPT | Mod: ,,, | Performed by: FAMILY MEDICINE

## 2025-01-22 PROCEDURE — 4010F ACE/ARB THERAPY RXD/TAKEN: CPT | Mod: ,,, | Performed by: FAMILY MEDICINE

## 2025-01-22 PROCEDURE — 3074F SYST BP LT 130 MM HG: CPT | Mod: ,,, | Performed by: FAMILY MEDICINE

## 2025-01-22 PROCEDURE — G0439 PPPS, SUBSEQ VISIT: HCPCS | Mod: ,,, | Performed by: FAMILY MEDICINE

## 2025-01-22 RX ORDER — HYDROCHLOROTHIAZIDE 25 MG/1
25 TABLET ORAL DAILY
Qty: 90 TABLET | Refills: 1 | Status: SHIPPED | OUTPATIENT
Start: 2025-01-22

## 2025-01-22 RX ORDER — AMLODIPINE BESYLATE 5 MG/1
5 TABLET ORAL DAILY
Qty: 90 TABLET | Refills: 1 | Status: SHIPPED | OUTPATIENT
Start: 2025-01-22 | End: 2026-01-22

## 2025-01-22 RX ORDER — LISINOPRIL 40 MG/1
40 TABLET ORAL DAILY
Qty: 90 TABLET | Refills: 1 | Status: SHIPPED | OUTPATIENT
Start: 2025-01-22

## 2025-01-22 RX ORDER — HYDRALAZINE HYDROCHLORIDE 25 MG/1
25 TABLET, FILM COATED ORAL EVERY 8 HOURS
Qty: 270 TABLET | Refills: 1 | Status: SHIPPED | OUTPATIENT
Start: 2025-01-22

## 2025-01-22 RX ORDER — METOPROLOL SUCCINATE 25 MG/1
25 TABLET, EXTENDED RELEASE ORAL DAILY
Qty: 90 TABLET | Refills: 1 | Status: SHIPPED | OUTPATIENT
Start: 2025-01-22 | End: 2026-01-22

## 2025-01-22 RX ORDER — LEVOTHYROXINE SODIUM 100 UG/1
100 TABLET ORAL
Qty: 90 TABLET | Refills: 1 | Status: SHIPPED | OUTPATIENT
Start: 2025-01-22 | End: 2026-01-22

## 2025-03-22 ENCOUNTER — PATIENT MESSAGE (OUTPATIENT)
Dept: ADMINISTRATIVE | Facility: OTHER | Age: 60
End: 2025-03-22

## 2025-05-02 ENCOUNTER — PATIENT MESSAGE (OUTPATIENT)
Dept: ADMINISTRATIVE | Facility: OTHER | Age: 60
End: 2025-05-02

## 2025-05-16 ENCOUNTER — OFFICE VISIT (OUTPATIENT)
Dept: FAMILY MEDICINE | Facility: CLINIC | Age: 60
End: 2025-05-16
Payer: MEDICARE

## 2025-05-16 ENCOUNTER — HOSPITAL ENCOUNTER (OUTPATIENT)
Dept: RADIOLOGY | Facility: HOSPITAL | Age: 60
Discharge: HOME | End: 2025-05-16
Attending: FAMILY MEDICINE
Payer: MEDICARE

## 2025-05-16 VITALS
RESPIRATION RATE: 18 BRPM | TEMPERATURE: 98 F | BODY MASS INDEX: 32.33 KG/M2 | HEIGHT: 67 IN | HEART RATE: 94 BPM | OXYGEN SATURATION: 98 % | DIASTOLIC BLOOD PRESSURE: 73 MMHG | SYSTOLIC BLOOD PRESSURE: 95 MMHG | WEIGHT: 206 LBS

## 2025-05-16 DIAGNOSIS — Z78.9 CURRENT NON-SMOKER: ICD-10-CM

## 2025-05-16 DIAGNOSIS — R06.02 SHORTNESS OF BREATH: ICD-10-CM

## 2025-05-16 DIAGNOSIS — F41.9 ANXIETY: ICD-10-CM

## 2025-05-16 DIAGNOSIS — R00.2 PALPITATION: Primary | ICD-10-CM

## 2025-05-16 LAB
ALBUMIN SERPL BCP-MCNC: 4.2 G/DL (ref 3.5–5)
ALBUMIN/GLOB SERPL: 1.1 {RATIO}
ALP SERPL-CCNC: 86 U/L (ref 40–150)
ALT SERPL W P-5'-P-CCNC: 11 U/L
ANION GAP SERPL CALCULATED.3IONS-SCNC: 17 MMOL/L (ref 7–16)
AST SERPL W P-5'-P-CCNC: 23 U/L (ref 11–45)
BASOPHILS # BLD AUTO: 0.06 K/UL (ref 0–0.2)
BASOPHILS NFR BLD AUTO: 0.6 % (ref 0–1)
BILIRUB SERPL-MCNC: 0.3 MG/DL
BUN SERPL-MCNC: 20 MG/DL (ref 10–20)
BUN/CREAT SERPL: 5 (ref 6–20)
CALCIUM SERPL-MCNC: 9.2 MG/DL (ref 8.4–10.2)
CHLORIDE SERPL-SCNC: 99 MMOL/L (ref 98–107)
CO2 SERPL-SCNC: 22 MMOL/L (ref 22–29)
CREAT SERPL-MCNC: 3.71 MG/DL (ref 0.55–1.02)
DIFFERENTIAL METHOD BLD: ABNORMAL
EGFR (NO RACE VARIABLE) (RUSH/TITUS): 14 ML/MIN/1.73M2
EOSINOPHIL # BLD AUTO: 0.07 K/UL (ref 0–0.5)
EOSINOPHIL NFR BLD AUTO: 0.7 % (ref 1–4)
EOSINOPHIL NFR BLD MANUAL: 1 % (ref 1–4)
ERYTHROCYTE [DISTWIDTH] IN BLOOD BY AUTOMATED COUNT: 14.8 % (ref 11.5–14.5)
GLOBULIN SER-MCNC: 3.9 G/DL (ref 2–4)
GLUCOSE SERPL-MCNC: 100 MG/DL (ref 74–100)
HCT VFR BLD AUTO: 40.5 % (ref 38–47)
HGB BLD-MCNC: 13.2 G/DL (ref 12–16)
IMM GRANULOCYTES # BLD AUTO: 0.02 K/UL (ref 0–0.04)
IMM GRANULOCYTES NFR BLD: 0.2 % (ref 0–0.4)
LYMPHOCYTES # BLD AUTO: 5.88 K/UL (ref 1–4.8)
LYMPHOCYTES NFR BLD AUTO: 55.9 % (ref 27–41)
LYMPHOCYTES NFR BLD MANUAL: 53 % (ref 27–41)
MCH RBC QN AUTO: 28.3 PG (ref 27–31)
MCHC RBC AUTO-ENTMCNC: 32.6 G/DL (ref 32–36)
MCV RBC AUTO: 86.7 FL (ref 80–96)
MONOCYTES # BLD AUTO: 0.76 K/UL (ref 0–0.8)
MONOCYTES NFR BLD AUTO: 7.2 % (ref 2–6)
MONOCYTES NFR BLD MANUAL: 7 % (ref 2–6)
MPC BLD CALC-MCNC: 9.4 FL (ref 9.4–12.4)
NEUTROPHILS # BLD AUTO: 3.72 K/UL (ref 1.8–7.7)
NEUTROPHILS NFR BLD AUTO: 35.4 % (ref 53–65)
NEUTS SEG NFR BLD MANUAL: 39 % (ref 50–62)
NRBC # BLD AUTO: 0 X10E3/UL
NRBC, AUTO (.00): 0 %
PLATELET # BLD AUTO: 393 K/UL (ref 150–400)
PLATELET MORPHOLOGY: ABNORMAL
POTASSIUM SERPL-SCNC: 3.9 MMOL/L (ref 3.5–5.1)
PROT SERPL-MCNC: 8.1 G/DL (ref 6.4–8.3)
RBC # BLD AUTO: 4.67 M/UL (ref 4.2–5.4)
RBC MORPH BLD: NORMAL
SODIUM SERPL-SCNC: 134 MMOL/L (ref 136–145)
TSH SERPL DL<=0.005 MIU/L-ACNC: 0.62 UIU/ML (ref 0.35–4.94)
WBC # BLD AUTO: 10.51 K/UL (ref 4.5–11)

## 2025-05-16 PROCEDURE — 80050 GENERAL HEALTH PANEL: CPT | Mod: ,,, | Performed by: CLINICAL MEDICAL LABORATORY

## 2025-05-16 PROCEDURE — 93010 ELECTROCARDIOGRAM REPORT: CPT | Mod: ,,, | Performed by: FAMILY MEDICINE

## 2025-05-16 PROCEDURE — 1160F RVW MEDS BY RX/DR IN RCRD: CPT | Mod: ,,, | Performed by: FAMILY MEDICINE

## 2025-05-16 PROCEDURE — 4010F ACE/ARB THERAPY RXD/TAKEN: CPT | Mod: ,,, | Performed by: FAMILY MEDICINE

## 2025-05-16 PROCEDURE — 93005 ELECTROCARDIOGRAM TRACING: CPT | Mod: ,,, | Performed by: FAMILY MEDICINE

## 2025-05-16 PROCEDURE — 71046 X-RAY EXAM CHEST 2 VIEWS: CPT | Mod: TC,PN

## 2025-05-16 PROCEDURE — 3061F NEG MICROALBUMINURIA REV: CPT | Mod: ,,, | Performed by: FAMILY MEDICINE

## 2025-05-16 PROCEDURE — 3074F SYST BP LT 130 MM HG: CPT | Mod: ,,, | Performed by: FAMILY MEDICINE

## 2025-05-16 PROCEDURE — 71046 X-RAY EXAM CHEST 2 VIEWS: CPT | Mod: 26,,, | Performed by: RADIOLOGY

## 2025-05-16 PROCEDURE — 1159F MED LIST DOCD IN RCRD: CPT | Mod: ,,, | Performed by: FAMILY MEDICINE

## 2025-05-16 PROCEDURE — 3066F NEPHROPATHY DOC TX: CPT | Mod: ,,, | Performed by: FAMILY MEDICINE

## 2025-05-16 PROCEDURE — 99214 OFFICE O/P EST MOD 30 MIN: CPT | Mod: 25,,, | Performed by: FAMILY MEDICINE

## 2025-05-16 PROCEDURE — 3078F DIAST BP <80 MM HG: CPT | Mod: ,,, | Performed by: FAMILY MEDICINE

## 2025-05-16 PROCEDURE — 3008F BODY MASS INDEX DOCD: CPT | Mod: ,,, | Performed by: FAMILY MEDICINE

## 2025-05-16 RX ORDER — HYDROXYZINE HYDROCHLORIDE 25 MG/1
25 TABLET, FILM COATED ORAL 3 TIMES DAILY PRN
Qty: 30 TABLET | Refills: 0 | Status: SHIPPED | OUTPATIENT
Start: 2025-05-16

## 2025-05-16 RX ORDER — AMANTADINE HYDROCHLORIDE 100 MG/1
100 CAPSULE, GELATIN COATED ORAL 2 TIMES DAILY
COMMUNITY

## 2025-05-16 NOTE — PROGRESS NOTES
New Clinic Note    Nikkie Brown is a 60 y.o. female     CC:   Chief Complaint   Patient presents with    Fatigue     Complains of 8 days of extreme fatigue, and SOB. Quit smoking on 03/04/2025. Smokes medical marjuana  for her left wrist pain.     Shortness of Breath     Never had LDCT scan after 28 years  of smoking.     Hypertension     B/P sitting 95/73 P94 B/P standing 98/67 P98.    Health Maintenance     Hepatitis C Screening Never done  Hemoglobin A1c (Diabetic Prevention Screening) Never done  LDCT Lung Screen Never done     Dizziness        Subjective    History of Present Illness HPI   Patient complains of fatigue and shortness of breath. She complains of palpitations. She denies chest pain. She complains of anxiety and dizziness.   Current Medications[1]     Past Medical History:   Diagnosis Date    Anxiety     Bipolar disorder     Chronic kidney disease, unspecified     Fracture of left ankle, sequela     Hypertension     Hypothyroidism     Polyp of descending colon 05/21/2021    Rectal polyp 05/21/2021    Schizophrenia, unspecified     Screening for malignant neoplasm of colon 05/21/2021        Family History   Problem Relation Name Age of Onset    Hypertension Father Vidal Guillaume     Intellectual disability Father Vidal Guillaume     Cancer Father Vidal Guillaume     Mental illness Father Vidal Guillaume     Hypertension Sister Jessica Walker     Intellectual disability Sister Jessica Walker     Depression Sister Jessica Walker     Stroke Maternal Grandmother Michelle Guillaume     Heart disease Maternal Grandmother Michelle Guillaume     No Known Problems Brother          Past Surgical History:   Procedure Laterality Date     left wrist repair      ANKLE SURGERY Left 2001    broke ankle has a plate in this ankle    APPLICATION OF EXTERNAL FIXATION DEVICE TO UPPER EXTREMITY Left 03/03/2022    Procedure: APPLICATION, EXTERNAL FIXATION DEVICE, SMALL, HAND OR WRIST;  Surgeon: Vidal Hernandez MD;   "Location: [facility];  Service: Orthopedics;  Laterality: Left;    FRACTURE SURGERY      TUBAL LIGATION  1997        Review of Systems   Constitutional:  Positive for fatigue. Negative for fever.   HENT:  Negative for ear pain, postnasal drip, rhinorrhea and sinus pressure/congestion.    Respiratory:  Positive for shortness of breath. Negative for cough.    Cardiovascular:  Positive for palpitations. Negative for chest pain.   Gastrointestinal:  Negative for abdominal pain, diarrhea, nausea and vomiting.   Genitourinary:  Negative for dysuria.   Neurological:  Positive for dizziness. Negative for headaches.        BP 95/73 (BP Location: Left arm, Patient Position: Sitting)   Pulse 94   Temp 98.4 °F (36.9 °C) (Oral)   Resp 18   Ht 5' 7" (1.702 m)   Wt 93.4 kg (206 lb)   SpO2 98%   BMI 32.26 kg/m²      Physical Exam  HENT:      Head: Normocephalic and atraumatic.   Cardiovascular:      Rate and Rhythm: Normal rate and regular rhythm.   Pulmonary:      Effort: Pulmonary effort is normal.      Breath sounds: Normal breath sounds.   Neurological:      Mental Status: She is alert and oriented to person, place, and time.   Psychiatric:         Mood and Affect: Mood is anxious.          Assessment and Plan      ICD-10-CM ICD-9-CM   1. Palpitation  R00.2 785.1   2. Shortness of breath  R06.02 786.05   3. Current non-smoker  Z78.9 V49.89   4. Anxiety  F41.9 300.00        1. Palpitation  EKG - sinus rhythm. Right bundle branch block. Negative T in V2 and V3.   Refer to cardiology.   Labs as indicated.   -     POCT EKG 12-LEAD (Manually Resulted by Ordering Provider)  -     Ambulatory referral/consult to Cardiology; Future; Expected date: 05/23/2025  -     CBC Auto Differential; Future; Expected date: 05/16/2025  -     Comprehensive Metabolic Panel; Future; Expected date: 05/16/2025  -     TSH; Future; Expected date: 05/16/2025    2. Shortness of breath  Chest X-ray is normal.   -     X-Ray Chest PA And Lateral; " Future; Expected date: 05/16/2025  -     Ambulatory referral/consult to Cardiology; Future; Expected date: 05/23/2025    3. Current non-smoker  -     X-Ray Chest PA And Lateral; Future; Expected date: 05/16/2025    4. Anxiety  Patient is anxious. Gave her a paper bag to breathe in and patient calmed down. Given vistaril prescription for prn panic attacks. Possible cause of her shortness of breath and palpitations. Will review when labs come back after the weekend.     Other orders  -     Cancel: POCT Urine Drug Screen Presump  -     hydrOXYzine HCL (ATARAX) 25 MG tablet; Take 1 tablet (25 mg total) by mouth 3 (three) times daily as needed for Anxiety. (Patient not taking: Reported on 5/19/2025)  Dispense: 30 tablet; Refill: 0         There are no Patient Instructions on file for this visit.     No follow-ups on file.            [1]   Current Outpatient Medications:     amantadine HCL (SYMMETREL) 100 mg capsule, Take 100 mg by mouth 2 (two) times daily., Disp: , Rfl:     amitriptyline (ELAVIL) 75 MG tablet, Take 1 tablet (75 mg total) by mouth nightly., Disp: 90 tablet, Rfl: 1    amLODIPine (NORVASC) 5 MG tablet, Take 1 tablet (5 mg total) by mouth once daily., Disp: 90 tablet, Rfl: 1    ARIPiprazole (ABILIFY) 15 MG Tab, Take 1 tablet (15 mg total) by mouth once daily., Disp: 90 tablet, Rfl: 1    busPIRone (BUSPAR) 30 MG Tab, Take 1 tablet (30 mg total) by mouth 2 (two) times daily., Disp: 180 tablet, Rfl: 1    hydrALAZINE (APRESOLINE) 25 MG tablet, Take 1 tablet (25 mg total) by mouth every 8 (eight) hours., Disp: 270 tablet, Rfl: 1    hydroCHLOROthiazide (HYDRODIURIL) 25 MG tablet, Take 1 tablet (25 mg total) by mouth once daily., Disp: 90 tablet, Rfl: 1    hydrOXYzine pamoate (VISTARIL) 50 MG Cap, Take 50 mg by mouth every evening., Disp: , Rfl:     levothyroxine (SYNTHROID) 100 MCG tablet, Take 1 tablet (100 mcg total) by mouth before breakfast., Disp: 90 tablet, Rfl: 1    lisinopriL (PRINIVIL,ZESTRIL) 40 MG  tablet, Take 1 tablet (40 mg total) by mouth once daily., Disp: 90 tablet, Rfl: 1    metoprolol succinate (TOPROL-XL) 25 MG 24 hr tablet, Take 1 tablet (25 mg total) by mouth once daily., Disp: 90 tablet, Rfl: 1    traZODone (DESYREL) 150 MG tablet, Take 300 mg by mouth nightly as needed., Disp: , Rfl:     zolpidem (AMBIEN) 5 MG Tab, Take 2.5-5 mg by mouth nightly as needed., Disp: , Rfl:     hydrOXYzine HCL (ATARAX) 25 MG tablet, Take 1 tablet (25 mg total) by mouth 3 (three) times daily as needed for Anxiety. (Patient not taking: Reported on 5/19/2025), Disp: 30 tablet, Rfl: 0

## 2025-05-19 ENCOUNTER — OFFICE VISIT (OUTPATIENT)
Dept: FAMILY MEDICINE | Facility: CLINIC | Age: 60
End: 2025-05-19
Payer: MEDICARE

## 2025-05-19 ENCOUNTER — HOSPITAL ENCOUNTER (EMERGENCY)
Facility: HOSPITAL | Age: 60
Discharge: HOME | End: 2025-05-19
Payer: MEDICARE

## 2025-05-19 ENCOUNTER — RESULTS FOLLOW-UP (OUTPATIENT)
Dept: FAMILY MEDICINE | Facility: CLINIC | Age: 60
End: 2025-05-19

## 2025-05-19 VITALS
WEIGHT: 212 LBS | BODY MASS INDEX: 33.27 KG/M2 | DIASTOLIC BLOOD PRESSURE: 85 MMHG | RESPIRATION RATE: 18 BRPM | TEMPERATURE: 99 F | HEIGHT: 67 IN | OXYGEN SATURATION: 98 % | HEART RATE: 90 BPM | SYSTOLIC BLOOD PRESSURE: 132 MMHG

## 2025-05-19 VITALS
TEMPERATURE: 98 F | BODY MASS INDEX: 33.27 KG/M2 | HEIGHT: 67 IN | SYSTOLIC BLOOD PRESSURE: 140 MMHG | RESPIRATION RATE: 18 BRPM | DIASTOLIC BLOOD PRESSURE: 89 MMHG | OXYGEN SATURATION: 99 % | WEIGHT: 212 LBS | HEART RATE: 75 BPM

## 2025-05-19 DIAGNOSIS — N17.9 ACUTE RENAL FAILURE SUPERIMPOSED ON CHRONIC KIDNEY DISEASE, UNSPECIFIED ACUTE RENAL FAILURE TYPE, UNSPECIFIED CKD STAGE: Primary | ICD-10-CM

## 2025-05-19 DIAGNOSIS — N18.9 ACUTE RENAL FAILURE SUPERIMPOSED ON CHRONIC KIDNEY DISEASE, UNSPECIFIED ACUTE RENAL FAILURE TYPE, UNSPECIFIED CKD STAGE: Primary | ICD-10-CM

## 2025-05-19 DIAGNOSIS — R79.89 ELEVATED SERUM CREATININE: ICD-10-CM

## 2025-05-19 DIAGNOSIS — R89.9 ABNORMAL LABORATORY TEST RESULT: Primary | ICD-10-CM

## 2025-05-19 DIAGNOSIS — N18.32 STAGE 3B CHRONIC KIDNEY DISEASE: ICD-10-CM

## 2025-05-19 LAB
ALBUMIN SERPL BCP-MCNC: 4.4 G/DL (ref 3.5–5)
ALBUMIN/GLOB SERPL: 1 {RATIO}
ALP SERPL-CCNC: 97 U/L (ref 40–150)
ALT SERPL W P-5'-P-CCNC: 15 U/L
ANION GAP SERPL CALCULATED.3IONS-SCNC: 15 MMOL/L (ref 7–16)
AST SERPL W P-5'-P-CCNC: 29 U/L (ref 11–45)
BASOPHILS # BLD AUTO: 0.02 K/UL (ref 0–0.2)
BASOPHILS NFR BLD AUTO: 0.2 % (ref 0–1)
BILIRUB SERPL-MCNC: 0.3 MG/DL
BILIRUB UR QL STRIP: NEGATIVE
BUN SERPL-MCNC: 13 MG/DL (ref 10–20)
BUN/CREAT SERPL: 8 (ref 6–20)
CALCIUM SERPL-MCNC: 9.8 MG/DL (ref 8.4–10.2)
CHLORIDE SERPL-SCNC: 102 MMOL/L (ref 98–107)
CLARITY UR: CLEAR
CO2 SERPL-SCNC: 24 MMOL/L (ref 22–29)
COLOR UR: YELLOW
CREAT SERPL-MCNC: 1.67 MG/DL (ref 0.55–1.02)
DIFFERENTIAL METHOD BLD: ABNORMAL
EGFR (NO RACE VARIABLE) (RUSH/TITUS): 35 ML/MIN/1.73M2
EOSINOPHIL # BLD AUTO: 0.04 K/UL (ref 0–0.5)
EOSINOPHIL NFR BLD AUTO: 0.4 % (ref 1–4)
ERYTHROCYTE [DISTWIDTH] IN BLOOD BY AUTOMATED COUNT: 14.8 % (ref 11.5–14.5)
GLOBULIN SER-MCNC: 4.5 G/DL (ref 2–4)
GLUCOSE SERPL-MCNC: 94 MG/DL (ref 74–100)
GLUCOSE UR STRIP-MCNC: NEGATIVE MG/DL
HCT VFR BLD AUTO: 41.2 % (ref 38–47)
HGB BLD-MCNC: 13.6 G/DL (ref 12–16)
KETONES UR STRIP-SCNC: NEGATIVE MG/DL
LEUKOCYTE ESTERASE UR QL STRIP: NEGATIVE
LYMPHOCYTES # BLD AUTO: 4.43 K/UL (ref 1–4.8)
LYMPHOCYTES NFR BLD AUTO: 42.3 % (ref 27–41)
MCH RBC QN AUTO: 28.3 PG (ref 27–31)
MCHC RBC AUTO-ENTMCNC: 33 G/DL (ref 32–36)
MCV RBC AUTO: 85.8 FL (ref 80–96)
MONOCYTES # BLD AUTO: 0.56 K/UL (ref 0–0.8)
MONOCYTES NFR BLD AUTO: 5.3 % (ref 2–6)
MPC BLD CALC-MCNC: 9.5 FL (ref 9.4–12.4)
NEUTROPHILS # BLD AUTO: 5.42 K/UL (ref 1.8–7.7)
NEUTROPHILS NFR BLD AUTO: 51.8 % (ref 53–65)
NITRITE UR QL STRIP: NEGATIVE
PH UR STRIP: 6 PH UNITS
PLATELET # BLD AUTO: 396 K/UL (ref 150–400)
POTASSIUM SERPL-SCNC: 3.7 MMOL/L (ref 3.5–5.1)
PROT SERPL-MCNC: 8.9 G/DL (ref 6.4–8.3)
PROT UR QL STRIP: NEGATIVE
RBC # BLD AUTO: 4.8 M/UL (ref 4.2–5.4)
RBC # UR STRIP: NEGATIVE /UL
SODIUM SERPL-SCNC: 137 MMOL/L (ref 136–145)
SP GR UR STRIP: <=1.005
TSH SERPL DL<=0.005 MIU/L-ACNC: 0.4 UIU/ML (ref 0.35–4.94)
UROBILINOGEN UR STRIP-ACNC: 0.2 MG/DL
WBC # BLD AUTO: 10.47 K/UL (ref 4.5–11)

## 2025-05-19 PROCEDURE — 4010F ACE/ARB THERAPY RXD/TAKEN: CPT | Mod: ,,, | Performed by: FAMILY MEDICINE

## 2025-05-19 PROCEDURE — 25000003 PHARM REV CODE 250

## 2025-05-19 PROCEDURE — 1160F RVW MEDS BY RX/DR IN RCRD: CPT | Mod: ,,, | Performed by: FAMILY MEDICINE

## 2025-05-19 PROCEDURE — 63600175 PHARM REV CODE 636 W HCPCS

## 2025-05-19 PROCEDURE — 3066F NEPHROPATHY DOC TX: CPT | Mod: ,,, | Performed by: FAMILY MEDICINE

## 2025-05-19 PROCEDURE — 99284 EMERGENCY DEPT VISIT MOD MDM: CPT | Mod: GF

## 2025-05-19 PROCEDURE — 99284 EMERGENCY DEPT VISIT MOD MDM: CPT | Mod: 25

## 2025-05-19 PROCEDURE — 85025 COMPLETE CBC W/AUTO DIFF WBC: CPT

## 2025-05-19 PROCEDURE — 1159F MED LIST DOCD IN RCRD: CPT | Mod: ,,, | Performed by: FAMILY MEDICINE

## 2025-05-19 PROCEDURE — 96374 THER/PROPH/DIAG INJ IV PUSH: CPT

## 2025-05-19 PROCEDURE — 3061F NEG MICROALBUMINURIA REV: CPT | Mod: ,,, | Performed by: FAMILY MEDICINE

## 2025-05-19 PROCEDURE — 3075F SYST BP GE 130 - 139MM HG: CPT | Mod: ,,, | Performed by: FAMILY MEDICINE

## 2025-05-19 PROCEDURE — 3008F BODY MASS INDEX DOCD: CPT | Mod: ,,, | Performed by: FAMILY MEDICINE

## 2025-05-19 PROCEDURE — 36415 COLL VENOUS BLD VENIPUNCTURE: CPT

## 2025-05-19 PROCEDURE — 81003 URINALYSIS AUTO W/O SCOPE: CPT

## 2025-05-19 PROCEDURE — 96361 HYDRATE IV INFUSION ADD-ON: CPT

## 2025-05-19 PROCEDURE — 99213 OFFICE O/P EST LOW 20 MIN: CPT | Mod: ,,, | Performed by: FAMILY MEDICINE

## 2025-05-19 PROCEDURE — 80053 COMPREHEN METABOLIC PANEL: CPT

## 2025-05-19 PROCEDURE — 3079F DIAST BP 80-89 MM HG: CPT | Mod: ,,, | Performed by: FAMILY MEDICINE

## 2025-05-19 RX ORDER — DIPHENHYDRAMINE HYDROCHLORIDE 50 MG/ML
25 INJECTION, SOLUTION INTRAMUSCULAR; INTRAVENOUS
Status: COMPLETED | OUTPATIENT
Start: 2025-05-19 | End: 2025-05-19

## 2025-05-19 RX ADMIN — DIPHENHYDRAMINE HYDROCHLORIDE 25 MG: 50 INJECTION, SOLUTION INTRAMUSCULAR; INTRAVENOUS at 11:05

## 2025-05-19 RX ADMIN — SODIUM CHLORIDE 1000 ML: 9 INJECTION, SOLUTION INTRAVENOUS at 11:05

## 2025-05-19 NOTE — ED PROVIDER NOTES
Encounter Date: 5/19/2025       History     Chief Complaint   Patient presents with    abnormal labs     VM is a 59 y/o AAF.    Patient has a PMHx of Anxiety, Bipolar Disorder, CKD, HTN, Hypothyroidism, and Schizophrenia.     Patient presents to the ED POV referred by [facility] (Dr. Vann called report to me on this patient) for abnormal lab values. Patient had a Serum Creatinine level of 3.71 on lab studies performed on 5/16/2025. Patient has Stage 3b Chronic Kidney Disease, last Serum Creatinine performed on 1/22/2025 was 2.06. Patient is followed by Dr. Zelda Mckeon with Nephrology. Patient denies any chest pain and/or shortness of breath. Patient stated that she has been having good urine output.         Review of patient's allergies indicates:  No Known Allergies  Past Medical History:   Diagnosis Date    Anxiety     Bipolar disorder     Chronic kidney disease, unspecified     Fracture of left ankle, sequela     Hypertension     Hypothyroidism     Polyp of descending colon 05/21/2021    Rectal polyp 05/21/2021    Schizophrenia, unspecified     Screening for malignant neoplasm of colon 05/21/2021     Past Surgical History:   Procedure Laterality Date     left wrist repair      ANKLE SURGERY Left 2001    [facility] ankle has a plate in this ankle    APPLICATION OF EXTERNAL FIXATION DEVICE TO UPPER EXTREMITY Left 03/03/2022    Procedure: APPLICATION, EXTERNAL FIXATION DEVICE, SMALL, HAND OR WRIST;  Surgeon: Vidal Hernandez MD;  Location: [facility];  Service: Orthopedics;  Laterality: Left;    FRACTURE SURGERY      TUBAL LIGATION  1997     Family History   Problem Relation Name Age of Onset    Hypertension Father Vidal Guillaume     Intellectual disability Father Vidal Guillaume     Cancer Father Vidal Guillaume     Mental illness Father Vidal Guillaume     Hypertension Sister Jessica Cardoso     Intellectual disability Sister Jessica Cardoso     Depression Sister Jessica Cardoso      Stroke Maternal Grandmother Michelle Guillaume     Heart disease Maternal Grandmother Michelle Guillaume     No Known Problems Brother       Social History[1]  Review of Systems    Physical Exam     Initial Vitals [05/19/25 1101]   BP Pulse Resp Temp SpO2   (!) 158/91 95 20 98 °F (36.7 °C) 98 %      MAP       --         Physical Exam    Nursing note and vitals reviewed.  Constitutional: Vital signs are normal. She appears well-developed and well-nourished. She is not diaphoretic. She is Obese . She is cooperative.  Non-toxic appearance. She does not have a sickly appearance. She appears ill. No distress.   Neck: Trachea normal and phonation normal. Neck supple. No thyroid mass and no thyromegaly present. No stridor present. No tracheal tenderness present. No tracheal deviation present.   Normal range of motion.   Full passive range of motion without pain.     Cardiovascular:  Normal rate, regular rhythm, S1 normal, S2 normal, normal heart sounds, intact distal pulses and normal pulses.           Pulmonary/Chest: Effort normal and breath sounds normal.   Musculoskeletal:      Cervical back: Full passive range of motion without pain, normal range of motion and neck supple. No edema, erythema or rigidity. No spinous process tenderness or muscular tenderness. Normal range of motion.     Lymphadenopathy:     She has no cervical adenopathy.     She has no axillary adenopathy.   Neurological: She is alert and oriented to person, place, and time. She has normal strength and normal reflexes. She displays normal reflexes. No cranial nerve deficit or sensory deficit. She displays a negative Romberg sign. GCS eye subscore is 4. GCS verbal subscore is 5. GCS motor subscore is 6.   Skin: Skin is warm, dry and intact. Capillary refill takes less than 2 seconds. No rash noted.   Psychiatric: Her behavior is normal. Judgment and thought content normal. Her mood appears anxious. Her speech is rapid and/or pressured. She is not actively  hallucinating. Cognition and memory are normal. She is attentive.         Medical Screening Exam   See Full Note    ED Course   Procedures  Labs Reviewed   COMPREHENSIVE METABOLIC PANEL - Abnormal       Result Value    Sodium 137      Potassium 3.7      Chloride 102      CO2 24      Anion Gap 15      Glucose 94      BUN 13      Creatinine 1.67 (*)     BUN/Creatinine Ratio 8      Calcium 9.8      Total Protein 8.9 (*)     Albumin 4.4      Globulin 4.5 (*)     A/G Ratio 1.0      Bilirubin, Total 0.3      Alk Phos 97      ALT 15      AST 29      eGFR 35 (*)    CBC WITH DIFFERENTIAL - Abnormal    WBC 10.47      RBC 4.80      Hemoglobin 13.6      Hematocrit 41.2      MCV 85.8      MCH 28.3      MCHC 33.0      RDW 14.8 (*)     Platelet Count 396      MPV 9.5      Neutrophils % 51.8 (*)     Lymphocytes % 42.3 (*)     Neutrophils, Abs 5.42      Lymphocytes, Absolute 4.43      Diff Type Auto      Monocytes % 5.3      Eosinophils % 0.4 (*)     Basophils % 0.2      Monocytes, Absolute 0.56      Eosinophils, Absolute 0.04      Basophils, Absolute 0.02     CBC W/ AUTO DIFFERENTIAL    Narrative:     The following orders were created for panel order CBC auto differential.  Procedure                               Abnormality         Status                     ---------                               -----------         ------                     CBC with Differential[[id number]]       Abnormal            Final result                 Please view results for these tests on the individual orders.   URINALYSIS, REFLEX TO URINE CULTURE    Color, UA Yellow      Clarity, UA Clear      pH, UA 6.0      Leukocytes, UA Negative      Nitrites, UA Negative      Protein, UA Negative      Glucose, UA Negative      Ketones, UA Negative      Urobilinogen, UA 0.2      Bilirubin, UA Negative      Blood, UA Negative      Specific Gravity, UA <=1.005     TSH          Imaging Results    None          Medications   sodium chloride 0.9% bolus 1,000 mL  1,000 mL (1,000 mLs Intravenous New Bag 5/19/25 1150)   diphenhydrAMINE injection 25 mg (25 mg Intravenous Given 5/19/25 1107)     Medical Decision Making  NEDA is a 61 y/o AAF.    Patient has a PMHx of Anxiety, Bipolar Disorder, CKD, HTN, Hypothyroidism, and Schizophrenia.     Patient presents to the ED POV referred by [facility] (Dr. Vann called report to me on this patient) for abnormal lab values. Patient had a Serum Creatinine level of 3.71 on lab studies performed on 5/16/2025. Patient has Stage 3b Chronic Kidney Disease, last Serum Creatinine performed on 1/22/2025 was 2.06. Patient is followed by Dr. Zelda Mckeon with Nephrology. Patient denies any chest pain and/or shortness of breath. Patient stated that she has been having good urine output.           Amount and/or Complexity of Data Reviewed  Labs: ordered. Decision-making details documented in ED Course.    Risk  Prescription drug management.  Risk Details: Low;  Dehydration  Abnormal Lab values   CKD  Elevated Serum Creatinine               ED Course as of 05/19/25 1159   Mon May 19, 2025   1154 Lab results reviewed by me and discussed with patient.Discharge instructions given along with strict return precautions, patient verbalizes understanding.   [AC]      ED Course User Index  [AC] Jorge Goldman FNP                           Clinical Impression:   Final diagnoses:  [R89.9] Abnormal laboratory test result (Primary)  [N18.32] Stage 3b chronic kidney disease  [R79.89] Elevated serum creatinine        ED Disposition Condition    Discharge Stable          ED Prescriptions    None       Follow-up Information       Follow up With Specialties Details Why Contact Info    Alejandra Vann MD Family Medicine  As needed [address]  [facility] [address]  [phone]                 [1]   Social History  Tobacco Use    Smoking status: Former     Current packs/day: 0.00     Average packs/day:  1 pack/day for 27.1 years (27.1 ttl pk-yrs)     Types: Cigarettes     Start date: 1998     Quit date: 3/4/2025     Years since quittin.2     Passive exposure: Past    Smokeless tobacco: Never    Tobacco comments:     Smoked marijuana for years. Started smoking at age of 32 cigarettes. Recently quit smoking cigarettes due to SOB on 2025   Substance Use Topics    Alcohol use: Not Currently    Drug use: Yes     Frequency: 3.0 times per week     Types: Marijuana     Comment: Pt states she quit on Thursday        Jorge Goldman FNP  25 1159

## 2025-05-19 NOTE — ED TRIAGE NOTES
"Pt to ED from Dr. Vann"s office for elevated BUN/CR. Pt states that she sees Dr. Mckeon for her kidneys and has an appt in July.   "

## 2025-05-19 NOTE — PROGRESS NOTES
"New Clinic Note    Nikkie Brown is a 60 y.o. female     CC:   Chief Complaint   Patient presents with    Anxiety     Patient stated she is here for ER follow up for a recurrent "panic attack". Was given shot of Vistaril in the ER at [facility] Saturday. Patient stated that [facility] Pharmacist would not fill her Hydroxyzine prescription sent in on 05/16/2025 because she was already taking Hydoxyzine Pamoate for her panic attacks. Stopped smoking cigarettes on 03/04/25 and stopped medical marijuana on Thursday because of her fatigue and SOB. Blood pressure is better today.     Follow-up     Never had LDCT scan of her lungs after 28 years of smoking. Never had Hep C screen or HGB A1C diabetic screening and would like to have these done.     Fatigue     Stated she still feel weak and gets out of breath quickly. Cardiology referral has been done and she is scheduled for an appointment for May 22,2025. Creatinine is elevated  up to 3.71 and she has weight gain of 6 pounds since her last visit. Sees Dr Zelda Mckeon in July 9,2025.     Shortness of Breath        Subjective    History of Present Illness   Patient complains of anxiety. She complains of fatigue and shortness of breath. She was seen on Friday and labs were drawn. Her creatinine was 3.71. This is up from 2.06 three months ago.  She says that she went to the [facility] ER  2 days ago and was given Vistaril.  She did not have labs.  Patient complains of nausea and not wanting to eat.  She also decided to stop her medical marijuana 4 days ago.  She has been on this for several years.  She says this could be part of the reason for her anxiety.  Current Medications[1]     Past Medical History:   Diagnosis Date    Anxiety     Bipolar disorder     Chronic kidney disease, unspecified     Fracture of left ankle, sequela     Hypertension     Hypothyroidism     Polyp of descending colon 05/21/2021    Rectal polyp 05/21/2021    Schizophrenia, unspecified     Screening for " "malignant neoplasm of colon 05/21/2021        Family History   Problem Relation Name Age of Onset    Hypertension Father Vidal Guillaume     Intellectual disability Father Vidal Guillaume     Cancer Father Vidal Guillaume     Mental illness Father Vidal Guillaume     Hypertension Sister Jessica Cardoso     Intellectual disability Sister Jessica Cardoso     Depression Sister Jessica Cardoso     Stroke Maternal Grandmother Michelle Guillaume     Heart disease Maternal Grandmother Michelle Guillaume     No Known Problems Brother          Past Surgical History:   Procedure Laterality Date     left wrist repair      ANKLE SURGERY Left 2001    broke ankle has a plate in this ankle    APPLICATION OF EXTERNAL FIXATION DEVICE TO UPPER EXTREMITY Left 03/03/2022    Procedure: APPLICATION, EXTERNAL FIXATION DEVICE, SMALL, HAND OR WRIST;  Surgeon: Vidal Hernandez MD;  Location: [facility];  Service: Orthopedics;  Laterality: Left;    FRACTURE SURGERY      TUBAL LIGATION  1997        Review of Systems   Constitutional:  Positive for fatigue. Negative for fever.   HENT:  Negative for ear pain, postnasal drip, rhinorrhea and sinus pressure/congestion.    Respiratory:  Positive for shortness of breath. Negative for cough.    Cardiovascular:  Negative for chest pain.   Gastrointestinal:  Positive for nausea. Negative for abdominal pain, diarrhea and vomiting.   Genitourinary:  Negative for dysuria.   Neurological:  Negative for headaches.   Psychiatric/Behavioral:  The patient is nervous/anxious.         /85 (BP Location: Left arm, Patient Position: Sitting)   Pulse 90   Temp 99.3 °F (37.4 °C) (Oral)   Resp 18   Ht 5' 7" (1.702 m)   Wt 96.2 kg (212 lb)   SpO2 98%   BMI 33.20 kg/m²      Physical Exam  HENT:      Head: Normocephalic and atraumatic.   Cardiovascular:      Rate and Rhythm: Normal rate and regular rhythm.   Pulmonary:      Effort: Pulmonary effort is normal.      Breath sounds: Normal breath sounds. "   Neurological:      Mental Status: She is alert and oriented to person, place, and time.   Psychiatric:         Mood and Affect: Mood is anxious.         Behavior: Behavior normal.          Assessment and Plan      ICD-10-CM ICD-9-CM   1. Acute renal failure superimposed on chronic kidney disease, unspecified acute renal failure type, unspecified CKD stage  N17.9 584.9    N18.9 585.9        1. Acute renal failure superimposed on chronic kidney disease, unspecified acute renal failure type, unspecified CKD stage  Explained to patient that she would need to go to the ER for fluids and further workup. Patient requested [facility]. Spoke with Jorge Goldman NP. Patient was accepted. She will be transported by personal vehicle.   -     Refer to Emergency Dept.         Follow up if symptoms worsen or fail to improve.            [1]   Current Outpatient Medications:     amantadine HCL (SYMMETREL) 100 mg capsule, Take 100 mg by mouth 2 (two) times daily., Disp: , Rfl:     amitriptyline (ELAVIL) 75 MG tablet, Take 1 tablet (75 mg total) by mouth nightly., Disp: 90 tablet, Rfl: 1    amLODIPine (NORVASC) 5 MG tablet, Take 1 tablet (5 mg total) by mouth once daily., Disp: 90 tablet, Rfl: 1    ARIPiprazole (ABILIFY) 15 MG Tab, Take 1 tablet (15 mg total) by mouth once daily., Disp: 90 tablet, Rfl: 1    busPIRone (BUSPAR) 30 MG Tab, Take 1 tablet (30 mg total) by mouth 2 (two) times daily., Disp: 180 tablet, Rfl: 1    hydrALAZINE (APRESOLINE) 25 MG tablet, Take 1 tablet (25 mg total) by mouth every 8 (eight) hours., Disp: 270 tablet, Rfl: 1    hydroCHLOROthiazide (HYDRODIURIL) 25 MG tablet, Take 1 tablet (25 mg total) by mouth once daily., Disp: 90 tablet, Rfl: 1    hydrOXYzine pamoate (VISTARIL) 50 MG Cap, Take 50 mg by mouth every evening., Disp: , Rfl:     levothyroxine (SYNTHROID) 100 MCG tablet, Take 1 tablet (100 mcg total) by mouth before breakfast., Disp: 90 tablet, Rfl: 1    lisinopriL (PRINIVIL,ZESTRIL) 40 MG tablet,  Take 1 tablet (40 mg total) by mouth once daily., Disp: 90 tablet, Rfl: 1    metoprolol succinate (TOPROL-XL) 25 MG 24 hr tablet, Take 1 tablet (25 mg total) by mouth once daily., Disp: 90 tablet, Rfl: 1    traZODone (DESYREL) 150 MG tablet, Take 300 mg by mouth nightly as needed., Disp: , Rfl:     zolpidem (AMBIEN) 5 MG Tab, Take 2.5-5 mg by mouth nightly as needed., Disp: , Rfl:     hydrOXYzine HCL (ATARAX) 25 MG tablet, Take 1 tablet (25 mg total) by mouth 3 (three) times daily as needed for Anxiety. (Patient not taking: Reported on 5/19/2025), Disp: 30 tablet, Rfl: 0

## 2025-05-21 ENCOUNTER — OFFICE VISIT (OUTPATIENT)
Dept: CARDIOLOGY | Facility: CLINIC | Age: 60
End: 2025-05-21
Payer: MEDICARE

## 2025-05-21 VITALS
HEART RATE: 71 BPM | DIASTOLIC BLOOD PRESSURE: 90 MMHG | RESPIRATION RATE: 18 BRPM | BODY MASS INDEX: 32.49 KG/M2 | HEIGHT: 67 IN | SYSTOLIC BLOOD PRESSURE: 160 MMHG | WEIGHT: 207 LBS

## 2025-05-21 DIAGNOSIS — I73.9 CLAUDICATION: Primary | ICD-10-CM

## 2025-05-21 DIAGNOSIS — N18.32 STAGE 3B CHRONIC KIDNEY DISEASE: Chronic | ICD-10-CM

## 2025-05-21 DIAGNOSIS — R06.02 SHORTNESS OF BREATH: ICD-10-CM

## 2025-05-21 DIAGNOSIS — I10 ESSENTIAL HYPERTENSION: Primary | Chronic | ICD-10-CM

## 2025-05-21 DIAGNOSIS — R00.2 PALPITATION: ICD-10-CM

## 2025-05-21 DIAGNOSIS — I10 ESSENTIAL HYPERTENSION: ICD-10-CM

## 2025-05-21 LAB
OHS QRS DURATION: 168 MS
OHS QTC CALCULATION: 462 MS

## 2025-05-21 PROCEDURE — 4010F ACE/ARB THERAPY RXD/TAKEN: CPT | Mod: CPTII,,, | Performed by: STUDENT IN AN ORGANIZED HEALTH CARE EDUCATION/TRAINING PROGRAM

## 2025-05-21 PROCEDURE — 3061F NEG MICROALBUMINURIA REV: CPT | Mod: CPTII,,, | Performed by: STUDENT IN AN ORGANIZED HEALTH CARE EDUCATION/TRAINING PROGRAM

## 2025-05-21 PROCEDURE — 99204 OFFICE O/P NEW MOD 45 MIN: CPT | Mod: S$PBB,,, | Performed by: STUDENT IN AN ORGANIZED HEALTH CARE EDUCATION/TRAINING PROGRAM

## 2025-05-21 PROCEDURE — 99215 OFFICE O/P EST HI 40 MIN: CPT | Mod: PBBFAC | Performed by: STUDENT IN AN ORGANIZED HEALTH CARE EDUCATION/TRAINING PROGRAM

## 2025-05-21 PROCEDURE — 3077F SYST BP >= 140 MM HG: CPT | Mod: CPTII,,, | Performed by: STUDENT IN AN ORGANIZED HEALTH CARE EDUCATION/TRAINING PROGRAM

## 2025-05-21 PROCEDURE — 3008F BODY MASS INDEX DOCD: CPT | Mod: CPTII,,, | Performed by: STUDENT IN AN ORGANIZED HEALTH CARE EDUCATION/TRAINING PROGRAM

## 2025-05-21 PROCEDURE — 99999 PR PBB SHADOW E&M-EST. PATIENT-LVL V: CPT | Mod: PBBFAC,,, | Performed by: STUDENT IN AN ORGANIZED HEALTH CARE EDUCATION/TRAINING PROGRAM

## 2025-05-21 PROCEDURE — 93010 ELECTROCARDIOGRAM REPORT: CPT | Mod: S$PBB,,, | Performed by: STUDENT IN AN ORGANIZED HEALTH CARE EDUCATION/TRAINING PROGRAM

## 2025-05-21 PROCEDURE — 3066F NEPHROPATHY DOC TX: CPT | Mod: CPTII,,, | Performed by: STUDENT IN AN ORGANIZED HEALTH CARE EDUCATION/TRAINING PROGRAM

## 2025-05-21 PROCEDURE — 93005 ELECTROCARDIOGRAM TRACING: CPT | Mod: PBBFAC | Performed by: STUDENT IN AN ORGANIZED HEALTH CARE EDUCATION/TRAINING PROGRAM

## 2025-05-21 PROCEDURE — 3080F DIAST BP >= 90 MM HG: CPT | Mod: CPTII,,, | Performed by: STUDENT IN AN ORGANIZED HEALTH CARE EDUCATION/TRAINING PROGRAM

## 2025-05-21 PROCEDURE — 1159F MED LIST DOCD IN RCRD: CPT | Mod: CPTII,,, | Performed by: STUDENT IN AN ORGANIZED HEALTH CARE EDUCATION/TRAINING PROGRAM

## 2025-05-21 RX ORDER — AMLODIPINE BESYLATE 10 MG/1
10 TABLET ORAL DAILY
Qty: 90 TABLET | Refills: 3 | Status: SHIPPED | OUTPATIENT
Start: 2025-05-21 | End: 2026-05-21

## 2025-05-21 NOTE — PROGRESS NOTES
PCP: Alejandra Vann MD    Referring Provider:     Subjective:   Nikkie Shearer is a 60 y.o. female with hx of Schziophrenia, HTN, CKD III, hypothryoidism who presents for evaluation of abnormal EKG.     Ms. Shearer presents today for evaluation of abnormal EKG. She reports waking up with difficulty breathing 3 times per night and requires 2 pillows for sleep. She experiences BLE swelling with equal pain intensity in both legs when walking. She also reports BLE fatigue and tiredness. She takes 5 BP edications, with 1 medication taken 3 times daily. She denies taking any cholesterol medications.     She quit smoking in March and is currently attempting to quit marijuana use.             EKG - 5/21/25 - NSR, LAD,RBBB      No cardiac monitor results found for the past 12 months  No cardiac monitor results found for the past 12 months    Lab Results   Component Value Date     05/19/2025    K 3.7 05/19/2025     05/19/2025    CO2 24 05/19/2025    BUN 13 05/19/2025    CREATININE 1.67 (H) 05/19/2025    CALCIUM 9.8 05/19/2025    ANIONGAP 15 05/19/2025    ESTGFRAFRICA 56 10/27/2020    EGFRNONAA 37 (L) 03/03/2022       Lab Results   Component Value Date    CHOL 163 01/22/2025    CHOL 160 02/21/2024    CHOL 140 08/21/2023     Lab Results   Component Value Date    HDL 32 (L) 01/22/2025    HDL 30 (L) 02/21/2024    HDL 40 08/21/2023     Lab Results   Component Value Date    LDLCALC 86 01/22/2025    LDLCALC 97 02/21/2024    LDLCALC 67 08/21/2023     Lab Results   Component Value Date    TRIG 224 (H) 01/22/2025    TRIG 165 (H) 02/21/2024    TRIG 165 (H) 08/21/2023     Lab Results   Component Value Date    CHOLHDL 5.1 01/22/2025    CHOLHDL 5.3 02/21/2024    CHOLHDL 3.5 08/21/2023       Lab Results   Component Value Date    WBC 10.47 05/19/2025    HGB 13.6 05/19/2025    HCT 41.2 05/19/2025    MCV 85.8 05/19/2025     05/19/2025         Current Medications[1]    Review of Systems   Respiratory:  Negative for  "cough and shortness of breath.    Cardiovascular:  Positive for claudication. Negative for chest pain, palpitations, orthopnea, leg swelling and PND.         Objective:   BP (!) 160/90   Pulse 71   Resp 18   Ht 5' 7" (1.702 m)   Wt 93.9 kg (207 lb)   BMI 32.42 kg/m²     Physical Exam  Constitutional:       Appearance: Normal appearance.   Cardiovascular:      Rate and Rhythm: Normal rate and regular rhythm.      Pulses: Normal pulses.      Heart sounds: Normal heart sounds. No murmur heard.  Pulmonary:      Effort: Pulmonary effort is normal.      Breath sounds: Normal breath sounds.   Musculoskeletal:         General: No swelling.   Neurological:      Mental Status: She is alert and oriented to person, place, and time.           Assessment:     1. Claudication  US Lower Extrem Arteries Bilat with ALESHIA (xpd)      2. Shortness of breath  Ambulatory referral/consult to Cardiology      3. Palpitation  Ambulatory referral/consult to Cardiology      4. Essential hypertension  amLODIPine (NORVASC) 10 MG tablet    Echo    US Lower Extrem Arteries Bilat with ALESHIA (xpd)      5. Stage 3b chronic kidney disease          Assessment & Plan    SHORTNESS OF BREATH:  Ordered echocardiogram.    PALPITATION:  Ordered echocardiogram.    ESSENTIAL HYPERTENSION:  Identified uncontrolled blood pressure as primary issue requiring intervention.  Increased Amlodipine from 5 mg to 10 mg daily.  Considered cholesterol management but decided against starting medication.    CLAUDICATION:  Assessed leg pain and fatigue, warranting further investigation.  Ordered ALESHIA with US duplex of the legs to assess circulation.    LIFESTYLE CHANGES:  Follow up with nurse to provide paperwork and set up appointments for ordered tests.    PLAN SUMMARY:  Ordered echocardiogram  Ordered ALESHIA with US duplex of legs  Increased Amlodipine to 10 mg daily  Follow up with nurse for paperwork and test appointments  Further investigation of leg pain and fatigue " required             Plan:   Stage 3b chronic kidney disease  Follows Dr. Mckeon    Essential hypertension  Uncontrolled  Increase amlodipine to 10mg qd  Patient is on low dose in 3 of 5 BP meds      This note was generated with the assistance of ambient listening technology. Verbal consent was obtained by the patient and accompanying visitor(s) for the recording of patient appointment to facilitate this note. I attest to having reviewed and edited the generated note for accuracy, though some syntax or spelling errors may persist. Please contact the author of this note for any clarification.               [1]   Current Outpatient Medications:     amantadine HCL (SYMMETREL) 100 mg capsule, Take 100 mg by mouth 2 (two) times daily., Disp: , Rfl:     amitriptyline (ELAVIL) 75 MG tablet, Take 1 tablet (75 mg total) by mouth nightly., Disp: 90 tablet, Rfl: 1    ARIPiprazole (ABILIFY) 15 MG Tab, Take 1 tablet (15 mg total) by mouth once daily., Disp: 90 tablet, Rfl: 1    busPIRone (BUSPAR) 30 MG Tab, Take 1 tablet (30 mg total) by mouth 2 (two) times daily., Disp: 180 tablet, Rfl: 1    hydrALAZINE (APRESOLINE) 25 MG tablet, Take 1 tablet (25 mg total) by mouth every 8 (eight) hours., Disp: 270 tablet, Rfl: 1    hydroCHLOROthiazide (HYDRODIURIL) 25 MG tablet, Take 1 tablet (25 mg total) by mouth once daily., Disp: 90 tablet, Rfl: 1    levothyroxine (SYNTHROID) 100 MCG tablet, Take 1 tablet (100 mcg total) by mouth before breakfast., Disp: 90 tablet, Rfl: 1    lisinopriL (PRINIVIL,ZESTRIL) 40 MG tablet, Take 1 tablet (40 mg total) by mouth once daily., Disp: 90 tablet, Rfl: 1    metoprolol succinate (TOPROL-XL) 25 MG 24 hr tablet, Take 1 tablet (25 mg total) by mouth once daily., Disp: 90 tablet, Rfl: 1    traZODone (DESYREL) 150 MG tablet, Take 300 mg by mouth nightly as needed., Disp: , Rfl:     zolpidem (AMBIEN) 5 MG Tab, Take 2.5-5 mg by mouth nightly as needed., Disp: , Rfl:     amLODIPine (NORVASC) 10 MG tablet, Take  1 tablet (10 mg total) by mouth once daily., Disp: 90 tablet, Rfl: 3    hydrOXYzine HCL (ATARAX) 25 MG tablet, Take 1 tablet (25 mg total) by mouth 3 (three) times daily as needed for Anxiety., Disp: 30 tablet, Rfl: 0    hydrOXYzine pamoate (VISTARIL) 50 MG Cap, Take 50 mg by mouth every evening., Disp: , Rfl:

## 2025-05-22 ENCOUNTER — TELEPHONE (OUTPATIENT)
Dept: EMERGENCY MEDICINE | Facility: HOSPITAL | Age: 60
End: 2025-05-22
Payer: MEDICARE

## 2025-06-10 ENCOUNTER — HOSPITAL ENCOUNTER (OUTPATIENT)
Dept: CARDIOLOGY | Facility: HOSPITAL | Age: 60
Discharge: HOME | End: 2025-06-10
Attending: STUDENT IN AN ORGANIZED HEALTH CARE EDUCATION/TRAINING PROGRAM
Payer: MEDICARE

## 2025-06-10 ENCOUNTER — HOSPITAL ENCOUNTER (OUTPATIENT)
Dept: RADIOLOGY | Facility: HOSPITAL | Age: 60
Discharge: HOME | End: 2025-06-10
Attending: STUDENT IN AN ORGANIZED HEALTH CARE EDUCATION/TRAINING PROGRAM
Payer: MEDICARE

## 2025-06-10 DIAGNOSIS — I73.9 CLAUDICATION: ICD-10-CM

## 2025-06-10 DIAGNOSIS — I10 ESSENTIAL HYPERTENSION: ICD-10-CM

## 2025-06-10 LAB
AORTIC ROOT ANNULUS: 2.6 CM
AORTIC VALVE CUSP SEPERATION: 1.91 CM
AV INDEX (PROSTH): 0.59
AV MEAN GRADIENT: 4 MMHG
AV PEAK GRADIENT: 8 MMHG
AV VALVE AREA BY VELOCITY RATIO: 2 CM²
AV VALVE AREA: 1.8 CM²
AV VELOCITY RATIO: 0.64
CV ECHO LV RWT: 0.27 CM
DOP CALC AO PEAK VEL: 1.4 M/S
DOP CALC AO VTI: 29.4 CM
DOP CALC LVOT AREA: 3.1 CM2
DOP CALC LVOT DIAMETER: 2 CM
DOP CALC LVOT PEAK VEL: 0.9 M/S
DOP CALC LVOT STROKE VOLUME: 54 CM3
DOP CALCLVOT PEAK VEL VTI: 17.2 CM
E WAVE DECELERATION TIME: 205 MSEC
E/A RATIO: 0.78
E/E' RATIO: 7 M/S
ECHO LV POSTERIOR WALL: 0.7 CM (ref 0.6–1.1)
FRACTIONAL SHORTENING: 32.7 % (ref 28–44)
INTERVENTRICULAR SEPTUM: 0.9 CM (ref 0.6–1.1)
IVC DIAMETER: 1.73 CM
LEFT ATRIUM AREA SYSTOLIC (APICAL 2 CHAMBER): 14.36 CM2
LEFT ATRIUM AREA SYSTOLIC (APICAL 4 CHAMBER): 12.89 CM2
LEFT ATRIUM VOLUME MOD: 34 ML
LEFT INTERNAL DIMENSION IN SYSTOLE: 3.5 CM (ref 2.1–4)
LEFT VENTRICLE DIASTOLIC VOLUME: 128 ML
LEFT VENTRICLE END SYSTOLIC VOLUME APICAL 2 CHAMBER: 33.67 ML
LEFT VENTRICLE END SYSTOLIC VOLUME APICAL 4 CHAMBER: 29.54 ML
LEFT VENTRICLE SYSTOLIC VOLUME: 52 ML
LEFT VENTRICULAR INTERNAL DIMENSION IN DIASTOLE: 5.2 CM (ref 3.5–6)
LEFT VENTRICULAR MASS: 145.2 G
LV LATERAL E/E' RATIO: 5.3 M/S
LV SEPTAL E/E' RATIO: 10.5 M/S
LVED V (TEICH): 128.03 ML
LVES V (TEICH): 51.57 ML
LVOT MG: 1.72 MMHG
LVOT MV: 0.63 CM/S
MV PEAK A VEL: 0.81 M/S
MV PEAK E VEL: 0.63 M/S
MV STENOSIS PRESSURE HALF TIME: 59.54 MS
MV VALVE AREA P 1/2 METHOD: 3.69 CM2
OHS CV RV/LV RATIO: 0.48 CM
PISA MRMAX VEL: 1.18 M/S
PISA TR MAX VEL: 2.3 M/S
PV PEAK GRADIENT: 3 MMHG
PV PEAK VELOCITY: 0.9 M/S
RA PRESSURE ESTIMATED: 3 MMHG
RA VOL SYS: 33.17 ML
RIGHT ATRIAL AREA: 14.4 CM2
RIGHT ATRIUM VOLUME AREA LENGTH APICAL 4 CHAMBER: 31.28 ML
RIGHT VENTRICLE DIASTOLIC BASEL DIMENSION: 2.5 CM
RIGHT VENTRICLE DIASTOLIC LENGTH: 6.1 CM
RIGHT VENTRICLE DIASTOLIC MID DIMENSION: 2.5 CM
RIGHT VENTRICULAR LENGTH IN DIASTOLE (APICAL 4-CHAMBER VIEW): 6.08 CM
RV MID DIAMA: 2.46 CM
RV TB RVSP: 5 MMHG
TDI LATERAL: 0.12 M/S
TDI SEPTAL: 0.06 M/S
TDI: 0.09 M/S
TR MAX PG: 21 MMHG
TRICUSPID ANNULAR PLANE SYSTOLIC EXCURSION: 2.1 CM
TV REST PULMONARY ARTERY PRESSURE: 24 MMHG

## 2025-06-10 PROCEDURE — 93922 UPR/L XTREMITY ART 2 LEVELS: CPT | Mod: 26,,, | Performed by: RADIOLOGY

## 2025-06-10 PROCEDURE — 93925 LOWER EXTREMITY STUDY: CPT | Mod: 26,,, | Performed by: RADIOLOGY

## 2025-06-10 PROCEDURE — 93306 TTE W/DOPPLER COMPLETE: CPT

## 2025-06-10 PROCEDURE — 93922 UPR/L XTREMITY ART 2 LEVELS: CPT | Mod: TC

## 2025-06-16 ENCOUNTER — RESULTS FOLLOW-UP (OUTPATIENT)
Dept: CARDIOLOGY | Facility: HOSPITAL | Age: 60
End: 2025-06-16

## 2025-06-18 ENCOUNTER — TELEPHONE (OUTPATIENT)
Dept: CARDIOLOGY | Facility: CLINIC | Age: 60
End: 2025-06-18
Payer: MEDICARE

## 2025-06-18 NOTE — TELEPHONE ENCOUNTER
Copied from CRM #[id number]. Topic: General Inquiry - Return Call  >> Jun 18, 2025  2:53 PM Dorina wrote:  Who Called: Nikkie Brown    She said she missed a call from Karin    Patient's Preferred Phone Number on File: [phone]    Call was returned echo and ALESHIA'S were normal per Dr. Silverman. Pt. Voiced understanding.

## 2025-06-21 ENCOUNTER — PATIENT MESSAGE (OUTPATIENT)
Dept: ADMINISTRATIVE | Facility: OTHER | Age: 60
End: 2025-06-21
Payer: MEDICARE

## 2025-07-09 ENCOUNTER — OFFICE VISIT (OUTPATIENT)
Dept: NEPHROLOGY | Facility: CLINIC | Age: 60
End: 2025-07-09
Payer: MEDICARE

## 2025-07-09 VITALS
OXYGEN SATURATION: 98 % | RESPIRATION RATE: 18 BRPM | SYSTOLIC BLOOD PRESSURE: 122 MMHG | HEIGHT: 67 IN | WEIGHT: 214.63 LBS | HEART RATE: 82 BPM | BODY MASS INDEX: 33.69 KG/M2 | DIASTOLIC BLOOD PRESSURE: 74 MMHG

## 2025-07-09 DIAGNOSIS — N18.32 STAGE 3B CHRONIC KIDNEY DISEASE: Primary | ICD-10-CM

## 2025-07-09 PROCEDURE — 3066F NEPHROPATHY DOC TX: CPT | Mod: CPTII,,, | Performed by: INTERNAL MEDICINE

## 2025-07-09 PROCEDURE — 1159F MED LIST DOCD IN RCRD: CPT | Mod: CPTII,,, | Performed by: INTERNAL MEDICINE

## 2025-07-09 PROCEDURE — 3074F SYST BP LT 130 MM HG: CPT | Mod: CPTII,,, | Performed by: INTERNAL MEDICINE

## 2025-07-09 PROCEDURE — 3061F NEG MICROALBUMINURIA REV: CPT | Mod: CPTII,,, | Performed by: INTERNAL MEDICINE

## 2025-07-09 PROCEDURE — 99999 PR PBB SHADOW E&M-EST. PATIENT-LVL IV: CPT | Mod: PBBFAC,,, | Performed by: INTERNAL MEDICINE

## 2025-07-09 PROCEDURE — 3008F BODY MASS INDEX DOCD: CPT | Mod: CPTII,,, | Performed by: INTERNAL MEDICINE

## 2025-07-09 PROCEDURE — 99214 OFFICE O/P EST MOD 30 MIN: CPT | Mod: PBBFAC | Performed by: INTERNAL MEDICINE

## 2025-07-09 PROCEDURE — 99214 OFFICE O/P EST MOD 30 MIN: CPT | Mod: S$PBB,,, | Performed by: INTERNAL MEDICINE

## 2025-07-09 PROCEDURE — 4010F ACE/ARB THERAPY RXD/TAKEN: CPT | Mod: CPTII,,, | Performed by: INTERNAL MEDICINE

## 2025-07-09 PROCEDURE — 3078F DIAST BP <80 MM HG: CPT | Mod: CPTII,,, | Performed by: INTERNAL MEDICINE

## 2025-07-09 RX ORDER — VALBENAZINE 60 MG/1
1 CAPSULE ORAL
COMMUNITY
Start: 2025-06-11 | End: 2025-07-09

## 2025-07-09 NOTE — PROGRESS NOTES
[facility] History and Physical  Patient Name: Nikkie Brown  MRN: [id number]  Age: 60 y.o.  : 1965    Date: 2025 1:59 PM    Chief Complaint: Chronic Kidney Disease and Follow-up     HPI :   Ms Brown presents to Nephrology clinic for routine follow up. She is followed by Alejandra Vann MD as her primary care provider.     HTN: diagnosed 19 years old. Current regimen includes amlodipine 5 mg daily, hydralazine 25 mg TID, hydrochlorothiazide 25 mg daily, lisinopril 40 mg daily, toprol 25 mg daily.     Schizophrenia: follows with Emeka. On Abilify, buspar, Ambien    Hypothyroidism: Levothyroxine 100 mcg daily    Nephrology history: No FH of kidney disease, no nephrolithiasis, or recurrent UTIs. No OTC medications including NSAIDs. Used to take naproxen daily for pain. No longer doing so.     Patient denies any CP, SOB, peripheral edema, dysuria, hematuria, changes in urinary habits, or increased frequency of urination.     Past Medical History:  has a past medical history of Anxiety, Bipolar disorder, Chronic kidney disease, unspecified, Fracture of left ankle, sequela, Hypertension, Hypothyroidism, Polyp of descending colon (2021), Rectal polyp (2021), Schizophrenia, unspecified, and Screening for malignant neoplasm of colon (2021).     Past Surgical History:   has a past surgical history that includes Tubal ligation (); Ankle surgery (Left, ); Application of external fixation device to upper extremity (Left, 2022); Fracture surgery (April); and  left wrist repair.     Family History:  family history includes Cancer in her father; Depression in her sister; Heart disease in her maternal grandmother; Hypertension in her father and sister; Intellectual disability in her father and sister; Mental illness in her father; No Known Problems in her brother; Stroke in her maternal grandmother. No family history of kidney  disease.     Social History:   reports that she quit smoking about 4 months ago. Her smoking use included cigarettes. She started smoking about 27 years ago. She has a 27.1 pack-year smoking history. She has been exposed to tobacco smoke. She has never used smokeless tobacco. She reports that she does not currently use alcohol. She reports current drug use. Frequency: 3.00 times per week. Drug: Marijuana.     Allergies: has no known allergies.     Medications: Reviewed including OTC medications, herbal supplements, and NSAIDS.     Old records have been reviewed.      Review of Systems:  ROS: A 10 point ROS was completed and found to be negative except for that mentioned above.          Physical Exam:  Vitals:    07/09/25 1430   BP: 122/74   Pulse: 82   Resp: 18     Did not take any BP meds today    Constitutional: sitting in chair, in NAD  Eyes: EOMI, white sclera  ENMT: moist mucus membranes, nares patent  Cardiovascular: normal rate, S1/S2 noted, no edema  Respiratory: symmetrical chest expansion, CTA-B  Gastrointestinal: +BS, soft, NT/ND  Musculoskeletal: normal, no joint erythema/effusions  Skin: no rash, no purpura, warm extremities  Neurological: Alert and Oriented x 3, afocal    Labs:   Lab Results   Component Value Date     05/19/2025    K 3.7 05/19/2025    CREATININE 1.67 (H) 05/19/2025    ALT 15 05/19/2025    AST 29 05/19/2025    LDLCALC 86 01/22/2025    CHOL 163 01/22/2025    HDL 32 (L) 01/22/2025    TRIG 224 (H) 01/22/2025    TSH 0.395 05/19/2025    WBC 10.47 05/19/2025    HGB 13.6 05/19/2025    HCT 41.2 05/19/2025     05/19/2025        Otherwise Reviewed    Assessment/Plan:     CKD stage IIIb in setting of hypertensive nephrosclerosis, NSAID use. Baseline sCr 1.5-1.7, today sCr pending. Counseled to avoid nephrotoxic agents such as NSAIDs. She is on ACEi which will help with CKD, HTN.     Proteinuria: urine Prot:Creat ratio is pending. Patient is on RAAS blockade with ACEi    Anemia: CBC  pending    BMD: Calcium and Phosphorus PTH, Vit D pending    HTN: well controlled with current meds usually, did not take BP meds this morning.     RTC 6 months with CBC, RFP, UA, urine for Prot:creat ratio, PTH, Vit D      Signature:       Zelda Mckeon DO  [facility]  [address] [facility] [address]  [phone]        20 minutes of total time spent on the encounter, which includes face to face time and non-face to face time preparing to see the patient (eg, review of tests), Obtaining and/or reviewing separately obtained history, Documenting clinical information in the electronic or other health record, Independently interpreting results (not separately reported) and communicating results to the patient/family/caregiver, or Care coordination (not separately reported).       Date of encounter: 7/9/25

## 2025-07-10 ENCOUNTER — TELEPHONE (OUTPATIENT)
Dept: NEPHROLOGY | Facility: CLINIC | Age: 60
End: 2025-07-10
Payer: MEDICARE

## 2025-07-10 ENCOUNTER — PATIENT MESSAGE (OUTPATIENT)
Dept: NEPHROLOGY | Facility: CLINIC | Age: 60
End: 2025-07-10
Payer: MEDICARE

## 2025-07-10 NOTE — TELEPHONE ENCOUNTER
----- Message from Zelda Mckeon DO sent at 7/10/2025  9:39 AM CDT -----  Renal function is stable.  She has no anemia no protein in her urine.  He has a mild vitamin-D deficiency I recommend a women's multivitamin with vitamin-D  ----- Message -----  From: Lab, Background User  Sent: 7/9/2025   4:18 PM CDT  To: Zelda Mckeon DO

## 2025-07-16 ENCOUNTER — OFFICE VISIT (OUTPATIENT)
Dept: FAMILY MEDICINE | Facility: CLINIC | Age: 60
End: 2025-07-16
Payer: MEDICARE

## 2025-07-16 VITALS
SYSTOLIC BLOOD PRESSURE: 112 MMHG | WEIGHT: 213 LBS | HEIGHT: 67 IN | OXYGEN SATURATION: 97 % | BODY MASS INDEX: 33.43 KG/M2 | DIASTOLIC BLOOD PRESSURE: 74 MMHG | HEART RATE: 74 BPM | TEMPERATURE: 98 F | RESPIRATION RATE: 18 BRPM

## 2025-07-16 DIAGNOSIS — F17.211 CIGARETTE NICOTINE DEPENDENCE IN REMISSION: ICD-10-CM

## 2025-07-16 DIAGNOSIS — Z87.891 PERSONAL HISTORY OF TOBACCO USE, PRESENTING HAZARDS TO HEALTH: Primary | ICD-10-CM

## 2025-07-16 PROCEDURE — 3074F SYST BP LT 130 MM HG: CPT | Mod: ,,, | Performed by: FAMILY MEDICINE

## 2025-07-16 PROCEDURE — 4010F ACE/ARB THERAPY RXD/TAKEN: CPT | Mod: ,,, | Performed by: FAMILY MEDICINE

## 2025-07-16 PROCEDURE — 3078F DIAST BP <80 MM HG: CPT | Mod: ,,, | Performed by: FAMILY MEDICINE

## 2025-07-16 PROCEDURE — 3008F BODY MASS INDEX DOCD: CPT | Mod: ,,, | Performed by: FAMILY MEDICINE

## 2025-07-16 PROCEDURE — G0296 VISIT TO DETERM LDCT ELIG: HCPCS | Mod: ,,, | Performed by: FAMILY MEDICINE

## 2025-07-16 PROCEDURE — 3061F NEG MICROALBUMINURIA REV: CPT | Mod: ,,, | Performed by: FAMILY MEDICINE

## 2025-07-16 PROCEDURE — 1159F MED LIST DOCD IN RCRD: CPT | Mod: ,,, | Performed by: FAMILY MEDICINE

## 2025-07-16 PROCEDURE — 99212 OFFICE O/P EST SF 10 MIN: CPT | Mod: ,,, | Performed by: FAMILY MEDICINE

## 2025-07-16 PROCEDURE — 1160F RVW MEDS BY RX/DR IN RCRD: CPT | Mod: ,,, | Performed by: FAMILY MEDICINE

## 2025-07-16 PROCEDURE — 3066F NEPHROPATHY DOC TX: CPT | Mod: ,,, | Performed by: FAMILY MEDICINE

## 2025-07-16 RX ORDER — VALBENAZINE 60 MG/1
60 CAPSULE ORAL DAILY
COMMUNITY

## 2025-07-16 NOTE — PROGRESS NOTES
New Clinic Note    Nikkie Brown is a 60 y.o. female     CC:   Chief Complaint   Patient presents with    Follow-up     Patient is here for her follow up today.     Health Maintenance     Patient is scheduled for her mammogram in September.         Subjective    History of Present Illness HPI   Patient is interested in having a LDCT. She started smoking cigaretttes at 18. She smoked 1.5 ppd. She quit smoking cigarettes in March. She still smokes weed.          Current Medications[1]     Past Medical History:   Diagnosis Date    Anxiety     Bipolar disorder     Chronic kidney disease, unspecified     Fracture of left ankle, sequela     Hypertension     Hypothyroidism     Polyp of descending colon 05/21/2021    Rectal polyp 05/21/2021    Schizophrenia, unspecified     Screening for malignant neoplasm of colon 05/21/2021        Family History   Problem Relation Name Age of Onset    Hypertension Father Vidal Guillaume     Intellectual disability Father Vidal Guillaume     Cancer Father Vidal Guillaume     Mental illness Father Vidal Guillaume     Hypertension Sister Jessica Cardoso     Intellectual disability Sister Jessica Cardoso     Depression Sister Jessica Cardoso     Stroke Maternal Grandmother Michelle Markell     Heart disease Maternal Grandmother Michelle Guillaume     No Known Problems Brother          Past Surgical History:   Procedure Laterality Date     left wrist repair      ANKLE SURGERY Left 2001    broke ankle has a plate in this ankle    APPLICATION OF EXTERNAL FIXATION DEVICE TO UPPER EXTREMITY Left 03/03/2022    Procedure: APPLICATION, EXTERNAL FIXATION DEVICE, SMALL, HAND OR WRIST;  Surgeon: Vidal Hernandez MD;  Location: [facility];  Service: Orthopedics;  Laterality: Left;    FRACTURE SURGERY  April    TUBAL LIGATION  1997        Review of Systems   Constitutional:  Negative for fatigue and fever.   HENT:  Negative for ear pain, postnasal drip, rhinorrhea and sinus pressure/congestion.   "  Respiratory:  Negative for cough and shortness of breath.    Cardiovascular:  Negative for chest pain.   Gastrointestinal:  Negative for abdominal pain, diarrhea, nausea and vomiting.   Genitourinary:  Negative for dysuria.   Neurological:  Negative for headaches.        /74 (BP Location: Left arm, Patient Position: Sitting)   Pulse 74   Temp 98.1 °F (36.7 °C) (Oral)   Resp 18   Ht 5' 7" (1.702 m)   Wt 96.6 kg (213 lb)   SpO2 97%   BMI 33.36 kg/m²      Physical Exam  HENT:      Head: Normocephalic and atraumatic.   Cardiovascular:      Rate and Rhythm: Normal rate and regular rhythm.   Pulmonary:      Effort: Pulmonary effort is normal.      Breath sounds: Normal breath sounds.   Neurological:      Mental Status: She is alert and oriented to person, place, and time.   Psychiatric:         Mood and Affect: Mood normal.         Behavior: Behavior normal.          Assessment and Plan      ICD-10-CM ICD-9-CM   1. Personal history of tobacco use, presenting hazards to health  Z87.891 V15.82   2. Cigarette nicotine dependence in remission  F17.211 V15.82        1. Personal history of tobacco use, presenting hazards to health      CT Chest Lung Screening Low Dose; Future; Expected date: 07/16/2025    2. Cigarette nicotine dependence in remission  Stable.          Follow up if symptoms worsen or fail to improve.       I reviewed with the patient the [place]. Preventative Services Task Force Recommendation on Lung Cancer Screening With Low-Dose Computed Tomography.  Patient meets eligibility criteria as per current CMS guidelines for initial LDCT lung cancer screening (age 50-77; asymptomatic; tobacco smoking hx of at least 20  pack years; current smoker or one who has quit smoking within the last 15 years).  Benefits and harms of screening discussed with patient, including follow-up diagnostic testing, over-diagnosis, false positive rate, and total radiation exposure.  Patient counseled on the importance of " adherence to annual lung cancer LDCT screening, impact of comorbidities, and ability or willingness to undergo diagnosis and treatment.  Patient counseled on the importance of maintaining cigarette smoking abstinence if former smoker or the importance of smoking cessation if current smoker and, if appropriate, furnishing of information about tobacco cessation  interventions.  All questions were answered.  Patient wishes to proceed with initial/baseline testing.  Order has been placed.            [1]   Current Outpatient Medications:     amantadine HCL (SYMMETREL) 100 mg capsule, Take 100 mg by mouth 2 (two) times daily., Disp: , Rfl:     amitriptyline (ELAVIL) 75 MG tablet, Take 1 tablet (75 mg total) by mouth nightly., Disp: 90 tablet, Rfl: 1    amLODIPine (NORVASC) 10 MG tablet, Take 1 tablet (10 mg total) by mouth once daily., Disp: 90 tablet, Rfl: 3    ARIPiprazole (ABILIFY) 15 MG Tab, Take 1 tablet (15 mg total) by mouth once daily., Disp: 90 tablet, Rfl: 1    busPIRone (BUSPAR) 30 MG Tab, Take 1 tablet (30 mg total) by mouth 2 (two) times daily., Disp: 180 tablet, Rfl: 1    calcium carbonate/vitamin D3 (VITAMIN D-3 ORAL), Take 10 mcg by mouth once daily., Disp: , Rfl:     hydroCHLOROthiazide (HYDRODIURIL) 25 MG tablet, Take 1 tablet (25 mg total) by mouth once daily., Disp: 90 tablet, Rfl: 1    hydrOXYzine pamoate (VISTARIL) 50 MG Cap, Take 50 mg by mouth every evening., Disp: , Rfl:     levothyroxine (SYNTHROID) 100 MCG tablet, Take 1 tablet (100 mcg total) by mouth before breakfast., Disp: 90 tablet, Rfl: 1    lisinopriL (PRINIVIL,ZESTRIL) 40 MG tablet, Take 1 tablet (40 mg total) by mouth once daily., Disp: 90 tablet, Rfl: 1    traZODone (DESYREL) 150 MG tablet, Take 300 mg by mouth nightly as needed., Disp: , Rfl:     valbenazine (INGREZZA) 60 mg Cap, 60 mg by intraocular injection route once daily., Disp: , Rfl:     zolpidem (AMBIEN) 5 MG Tab, Take 2.5-5 mg by mouth nightly as needed., Disp: , Rfl:      hydrALAZINE (APRESOLINE) 25 MG tablet, TAKE 1 TABLET BY MOUTH EVERY 8 HOURS, Disp: 270 tablet, Rfl: 0

## 2025-07-17 DIAGNOSIS — I10 ESSENTIAL HYPERTENSION: ICD-10-CM

## 2025-07-17 RX ORDER — HYDRALAZINE HYDROCHLORIDE 25 MG/1
25 TABLET, FILM COATED ORAL EVERY 8 HOURS
Qty: 270 TABLET | Refills: 0 | Status: SHIPPED | OUTPATIENT
Start: 2025-07-17

## 2025-07-31 ENCOUNTER — HOSPITAL ENCOUNTER (OUTPATIENT)
Dept: RADIOLOGY | Facility: HOSPITAL | Age: 60
Discharge: HOME | End: 2025-07-31
Attending: FAMILY MEDICINE
Payer: MEDICARE

## 2025-07-31 VITALS — BODY MASS INDEX: 33.43 KG/M2 | HEIGHT: 67 IN | WEIGHT: 213 LBS

## 2025-07-31 DIAGNOSIS — Z87.891 PERSONAL HISTORY OF TOBACCO USE, PRESENTING HAZARDS TO HEALTH: ICD-10-CM

## 2025-07-31 PROCEDURE — 71271 CT THORAX LUNG CANCER SCR C-: CPT | Mod: TC

## 2025-08-04 ENCOUNTER — HOSPITAL ENCOUNTER (EMERGENCY)
Facility: HOSPITAL | Age: 60
Discharge: HOME | End: 2025-08-04
Payer: MEDICARE

## 2025-08-04 VITALS
OXYGEN SATURATION: 98 % | HEART RATE: 70 BPM | HEIGHT: 67 IN | SYSTOLIC BLOOD PRESSURE: 148 MMHG | DIASTOLIC BLOOD PRESSURE: 80 MMHG | RESPIRATION RATE: 16 BRPM | WEIGHT: 213 LBS | BODY MASS INDEX: 33.43 KG/M2 | TEMPERATURE: 98 F

## 2025-08-04 DIAGNOSIS — R25.2 MUSCLE CRAMPS: Primary | ICD-10-CM

## 2025-08-04 LAB
ANION GAP SERPL CALCULATED.3IONS-SCNC: 13 MMOL/L (ref 7–16)
BASOPHILS # BLD AUTO: 0.01 K/UL (ref 0–0.2)
BASOPHILS NFR BLD AUTO: 0.1 % (ref 0–1)
BILIRUB UR QL STRIP: NEGATIVE
BUN SERPL-MCNC: 16 MG/DL (ref 10–20)
BUN/CREAT SERPL: 10 (ref 6–20)
CALCIUM SERPL-MCNC: 9.7 MG/DL (ref 8.4–10.2)
CHLORIDE SERPL-SCNC: 104 MMOL/L (ref 98–107)
CK SERPL-CCNC: 1867 U/L (ref 29–168)
CLARITY UR: CLEAR
CO2 SERPL-SCNC: 27 MMOL/L (ref 22–29)
COLOR UR: YELLOW
CREAT SERPL-MCNC: 1.65 MG/DL (ref 0.55–1.02)
DIFFERENTIAL METHOD BLD: ABNORMAL
EGFR (NO RACE VARIABLE) (RUSH/TITUS): 36 ML/MIN/1.73M2
EOSINOPHIL # BLD AUTO: 0.32 K/UL (ref 0–0.5)
EOSINOPHIL NFR BLD AUTO: 3 % (ref 1–4)
EOSINOPHIL NFR BLD MANUAL: 2 % (ref 1–4)
ERYTHROCYTE [DISTWIDTH] IN BLOOD BY AUTOMATED COUNT: 14.9 % (ref 11.5–14.5)
GLUCOSE SERPL-MCNC: 90 MG/DL (ref 74–100)
GLUCOSE UR STRIP-MCNC: NEGATIVE MG/DL
HCT VFR BLD AUTO: 38.6 % (ref 38–47)
HGB BLD-MCNC: 12.4 G/DL (ref 12–16)
KETONES UR STRIP-SCNC: NEGATIVE MG/DL
LEUKOCYTE ESTERASE UR QL STRIP: NEGATIVE
LYMPHOCYTES # BLD AUTO: 6.08 K/UL (ref 1–4.8)
LYMPHOCYTES NFR BLD AUTO: 56.7 % (ref 27–41)
LYMPHOCYTES NFR BLD MANUAL: 56 % (ref 27–41)
MAGNESIUM SERPL-MCNC: 2 MG/DL (ref 1.6–2.6)
MCH RBC QN AUTO: 27.8 PG (ref 27–31)
MCHC RBC AUTO-ENTMCNC: 32.1 G/DL (ref 32–36)
MCV RBC AUTO: 86.5 FL (ref 80–96)
MONOCYTES # BLD AUTO: 0.8 K/UL (ref 0–0.8)
MONOCYTES NFR BLD AUTO: 7.5 % (ref 2–6)
MONOCYTES NFR BLD MANUAL: 3 % (ref 2–6)
MPC BLD CALC-MCNC: 9.9 FL (ref 9.4–12.4)
NEUTROPHILS # BLD AUTO: 3.52 K/UL (ref 1.8–7.7)
NEUTROPHILS NFR BLD AUTO: 32.7 % (ref 53–65)
NEUTS SEG NFR BLD MANUAL: 39 % (ref 50–62)
NITRITE UR QL STRIP: NEGATIVE
NRBC BLD MANUAL-RTO: ABNORMAL %
PH UR STRIP: 6 PH UNITS
PLATELET # BLD AUTO: 363 K/UL (ref 150–400)
POTASSIUM SERPL-SCNC: 3.8 MMOL/L (ref 3.5–5.1)
PROT UR QL STRIP: NEGATIVE
RBC # BLD AUTO: 4.46 M/UL (ref 4.2–5.4)
RBC # UR STRIP: NEGATIVE /UL
SODIUM SERPL-SCNC: 140 MMOL/L (ref 136–145)
SP GR UR STRIP: 1.01
UROBILINOGEN UR STRIP-ACNC: 0.2 MG/DL
WBC # BLD AUTO: 10.73 K/UL (ref 4.5–11)

## 2025-08-04 PROCEDURE — 82550 ASSAY OF CK (CPK): CPT | Performed by: NURSE PRACTITIONER

## 2025-08-04 PROCEDURE — 83735 ASSAY OF MAGNESIUM: CPT | Performed by: NURSE PRACTITIONER

## 2025-08-04 PROCEDURE — 80048 BASIC METABOLIC PNL TOTAL CA: CPT | Performed by: NURSE PRACTITIONER

## 2025-08-04 PROCEDURE — 99284 EMERGENCY DEPT VISIT MOD MDM: CPT | Mod: GF | Performed by: NURSE PRACTITIONER

## 2025-08-04 PROCEDURE — 81003 URINALYSIS AUTO W/O SCOPE: CPT | Performed by: NURSE PRACTITIONER

## 2025-08-04 PROCEDURE — 63600175 PHARM REV CODE 636 W HCPCS: Performed by: NURSE PRACTITIONER

## 2025-08-04 PROCEDURE — 99284 EMERGENCY DEPT VISIT MOD MDM: CPT

## 2025-08-04 PROCEDURE — 85025 COMPLETE CBC W/AUTO DIFF WBC: CPT | Performed by: NURSE PRACTITIONER

## 2025-08-04 RX ORDER — CYCLOBENZAPRINE HCL 10 MG
10 TABLET ORAL 2 TIMES DAILY PRN
Qty: 10 TABLET | Refills: 0 | Status: SHIPPED | OUTPATIENT
Start: 2025-08-04 | End: 2025-08-09

## 2025-08-04 RX ADMIN — SODIUM CHLORIDE, POTASSIUM CHLORIDE, SODIUM LACTATE AND CALCIUM CHLORIDE 1000 ML: 600; 310; 30; 20 INJECTION, SOLUTION INTRAVENOUS at 08:08

## 2025-08-04 NOTE — ED PROVIDER NOTES
Encounter Date: 8/4/2025       History     Chief Complaint   Patient presents with    Cramping     Lower extremities     59 y/o AAF presents to the emergency department with c/o body aches. She states she was outside at a family reunion this weekend and thinks she got too hot. She has been drinking water and states she drank a liquid IV this morning but wanted to come get checked out. She is still urinating normally and it is light yellow in color. She denies nausea, vomiting, diaphoresis. Her cramps are mild and occur intermittently. She has had nothing for her symptoms. She knows of no particular exacerbating or remitting factors.     The history is provided by the patient.     Review of patient's allergies indicates:  No Known Allergies  Past Medical History:   Diagnosis Date    Anxiety     Bipolar disorder     Chronic kidney disease, unspecified     Fracture of left ankle, sequela     Hypertension     Hypothyroidism     Polyp of descending colon 05/21/2021    Rectal polyp 05/21/2021    Schizophrenia, unspecified     Screening for malignant neoplasm of colon 05/21/2021     Past Surgical History:   Procedure Laterality Date     left wrist repair      ANKLE SURGERY Left 2001    broke ankle has a plate in this ankle    APPLICATION OF EXTERNAL FIXATION DEVICE TO UPPER EXTREMITY Left 03/03/2022    Procedure: APPLICATION, EXTERNAL FIXATION DEVICE, SMALL, HAND OR WRIST;  Surgeon: Vidal Hernandez MD;  Location: [facility];  Service: Orthopedics;  Laterality: Left;    FRACTURE SURGERY  April    TUBAL LIGATION  1997     Family History   Problem Relation Name Age of Onset    Hypertension Father Vidal Guillaume     Intellectual disability Father Vidal Guillaume     Cancer Father Vidal Guillaume     Mental illness Father Vidal Guillaume     Hypertension Sister Jessica Cardoso     Intellectual disability Sister Jessica Cardoso     Depression Sister Jessica Cardoso     Stroke Maternal Grandmother Michelle Markell      Heart disease Maternal Grandmother Michelle Guillaume     No Known Problems Brother       Social History[1]  Review of Systems   All other systems reviewed and are negative.      Physical Exam     Initial Vitals [08/04/25 0756]   BP Pulse Resp Temp SpO2   (!) 148/80 70 16 97.8 °F (36.6 °C) 98 %      MAP       --         Physical Exam    Constitutional: She appears well-developed and well-nourished. She is cooperative.   HENT: Mouth/Throat: Oropharynx is clear and moist and mucous membranes are normal.   Cardiovascular:  Normal rate, regular rhythm and normal heart sounds.           Pulmonary/Chest: Effort normal and breath sounds normal.   Abdominal: Abdomen is soft. Bowel sounds are normal. There is no abdominal tenderness.     Neurological: She is alert and oriented to person, place, and time. She has normal strength. GCS eye subscore is 4. GCS verbal subscore is 5. GCS motor subscore is 6.   Skin: Skin is warm, dry and intact. Capillary refill takes less than 2 seconds.         Medical Screening Exam   See Full Note    ED Course   Procedures  Labs Reviewed   BASIC METABOLIC PANEL - Abnormal       Result Value    Sodium 140      Potassium 3.8      Chloride 104      CO2 27      Anion Gap 13      Glucose 90      BUN 16      Creatinine 1.65 (*)     BUN/Creatinine Ratio 10      Calcium 9.7      eGFR 36 (*)    CK - Abnormal    CK 1,867 (*)    CBC WITH DIFFERENTIAL - Abnormal    WBC 10.73      RBC 4.46      Hemoglobin 12.4      Hematocrit 38.6      MCV 86.5      MCH 27.8      MCHC 32.1      RDW 14.9 (*)     Platelet Count 363      MPV 9.9      Neutrophils % 32.7 (*)     Lymphocytes % 56.7 (*)     Neutrophils, Abs 3.52      Lymphocytes, Absolute 6.08 (*)     Diff Type Manual      Monocytes % 7.5 (*)     Eosinophils % 3.0      Basophils % 0.1      Monocytes, Absolute 0.80      Eosinophils, Absolute 0.32      Basophils, Absolute 0.01     MANUAL DIFFERENTIAL - Abnormal    Segmented Neutrophils, Man % 39 (*)     Lymphocytes, Man  % 56 (*)     Monocytes, Man % 3      Eosinophils, Man % 2      nRBC, Manual       MAGNESIUM - Normal    Magnesium 2.0     CBC W/ AUTO DIFFERENTIAL    Narrative:     The following orders were created for panel order CBC auto differential.  Procedure                               Abnormality         Status                     ---------                               -----------         ------                     CBC with Differential[[id number]]       Abnormal            Final result               Manual Differential[[id number]]         Abnormal            Final result                 Please view results for these tests on the individual orders.   URINALYSIS, REFLEX TO URINE CULTURE    Color, UA Yellow      Clarity, UA Clear      pH, UA 6.0      Leukocytes, UA Negative      Nitrites, UA Negative      Protein, UA Negative      Glucose, UA Negative      Ketones, UA Negative      Urobilinogen, UA 0.2      Bilirubin, UA Negative      Blood, UA Negative      Specific Gravity, UA 1.010            Imaging Results    None          Medications   lactated ringers bolus 1,000 mL (1,000 mLs Intravenous New Bag 8/4/25 0834)     Medical Decision Making  61 y/o AAF presents to the emergency department with c/o body aches. She states she was outside at a family reunion this weekend and thinks she got too hot. She has been drinking water and states she drank a liquid IV this morning but wanted to come get checked out. She is still urinating normally and it is light yellow in color. She denies nausea, vomiting, diaphoresis. Her cramps are mild and occur intermittently. She has had nothing for her symptoms. She knows of no particular exacerbating or remitting factors.     Problems Addressed:  Muscle cramps:     Details: Patient is afebrile, non toxic appearing. Electrolytes are normal. Renal function at baseline. Urinalysis looks ok. WBC count is normal. 1L LR given. CPK is elevated; however, no renal dysfunction, urinating well. Strict  instructions to drink PLENTY of water and electrolyte replacement drinks. Needs close follow up with PCP this week. Follow up instructions given. Warning s/s discussed and return precautions given; the patient has v/u.    Amount and/or Complexity of Data Reviewed  Labs: ordered.    Risk  OTC drugs.  Prescription drug management.                                      Clinical Impression:   Final diagnoses:  [R25.2] Muscle cramps (Primary)        ED Disposition Condition    Discharge Stable          ED Prescriptions       Medication Sig Dispense Start Date End Date Auth. Provider    cyclobenzaprine (FLEXERIL) 10 MG tablet Take 1 tablet (10 mg total) by mouth 2 (two) times daily as needed for Muscle spasms. This medication may make you sleepy 10 tablet 2025 Marcela Bradshaw FNP          Follow-up Information       Follow up With Specialties Details Why Contact Info    Alejandra Vann MD Family Medicine  As needed [address]  [facility] [place] [address]  [phone]                   [1]   Social History  Tobacco Use    Smoking status: Former     Current packs/day: 0.00     Average packs/day: 1 pack/day for 27.1 years (27.1 ttl pk-yrs)     Types: Cigarettes     Start date: 1998     Quit date: 3/4/2025     Years since quittin.4     Passive exposure: Past    Smokeless tobacco: Never    Tobacco comments:     Smoked marijuana for years. Started smoking at age of 32 cigarettes. Recently quit smoking cigarettes due to SOB on 2025   Substance Use Topics    Alcohol use: Not Currently    Drug use: Yes     Frequency: 35.0 times per week     Types: Marijuana     Comment: smoked at 2 AM this date        Marcela Bradshaw FNP  25 1404

## 2025-08-04 NOTE — ED TRIAGE NOTES
Received ambulatory with noted complaint. Patient voices sitting outside at family reunion when leg cramps began. Has gotten worse.

## 2025-08-04 NOTE — DISCHARGE INSTRUCTIONS
Use prescriptions as directed. Drink plenty of water and electrolyte replacement drinks. Follow up with your primary care provider if no improvement or otherwise as needed. Return to the ED for worsening signs and symptoms or otherwise as needed.

## 2025-08-06 DIAGNOSIS — I10 ESSENTIAL HYPERTENSION: ICD-10-CM

## 2025-08-06 RX ORDER — AMLODIPINE BESYLATE 10 MG/1
10 TABLET ORAL DAILY
Qty: 30 TABLET | Refills: 0 | Status: SHIPPED | OUTPATIENT
Start: 2025-08-06 | End: 2026-08-06

## 2025-08-06 RX ORDER — HYDROCHLOROTHIAZIDE 25 MG/1
25 TABLET ORAL DAILY
Qty: 30 TABLET | Refills: 0 | Status: SHIPPED | OUTPATIENT
Start: 2025-08-06

## 2025-08-06 NOTE — TELEPHONE ENCOUNTER
Copied from CRM #[id number]. Topic: Medications - Medication Refill  >> Aug 6, 2025  8:21 AM Julieta Bullard wrote:  Who Called: Nikkie Brown    Refill or New Rx:Refill  RX Name and Strength:hydroCHLOROthiazide (HYDRODIURIL) 25 MG tablet    amLODIPine (NORVASC) 10 MG tablet          Local or Mail Order:[facility] [address] [place] [address]  Phone: [phone] Fax: [phone]          Ordering Provider:Alejandra Vann MD        Preferred Method of Contact: Phone Call  Patient's Preferred Phone Number on File: [phone]   Best Call Back Number, if different:  Additional Information: Pt needs enough to last until her appt next week.

## 2025-08-07 ENCOUNTER — TELEPHONE (OUTPATIENT)
Dept: EMERGENCY MEDICINE | Facility: HOSPITAL | Age: 60
End: 2025-08-07
Payer: MEDICARE

## 2025-08-13 ENCOUNTER — OFFICE VISIT (OUTPATIENT)
Dept: FAMILY MEDICINE | Facility: CLINIC | Age: 60
End: 2025-08-13
Payer: MEDICARE

## 2025-08-13 VITALS
RESPIRATION RATE: 16 BRPM | SYSTOLIC BLOOD PRESSURE: 138 MMHG | HEIGHT: 67 IN | DIASTOLIC BLOOD PRESSURE: 86 MMHG | HEART RATE: 100 BPM | WEIGHT: 211.38 LBS | TEMPERATURE: 98 F | BODY MASS INDEX: 33.18 KG/M2 | OXYGEN SATURATION: 98 %

## 2025-08-13 DIAGNOSIS — I10 ESSENTIAL HYPERTENSION: Primary | ICD-10-CM

## 2025-08-13 DIAGNOSIS — E03.9 ACQUIRED HYPOTHYROIDISM: ICD-10-CM

## 2025-08-13 PROCEDURE — 1159F MED LIST DOCD IN RCRD: CPT | Mod: ,,, | Performed by: FAMILY MEDICINE

## 2025-08-13 PROCEDURE — 3061F NEG MICROALBUMINURIA REV: CPT | Mod: ,,, | Performed by: FAMILY MEDICINE

## 2025-08-13 PROCEDURE — 3066F NEPHROPATHY DOC TX: CPT | Mod: ,,, | Performed by: FAMILY MEDICINE

## 2025-08-13 PROCEDURE — 99214 OFFICE O/P EST MOD 30 MIN: CPT | Mod: ,,, | Performed by: FAMILY MEDICINE

## 2025-08-13 PROCEDURE — 3079F DIAST BP 80-89 MM HG: CPT | Mod: ,,, | Performed by: FAMILY MEDICINE

## 2025-08-13 PROCEDURE — 3075F SYST BP GE 130 - 139MM HG: CPT | Mod: ,,, | Performed by: FAMILY MEDICINE

## 2025-08-13 PROCEDURE — 4010F ACE/ARB THERAPY RXD/TAKEN: CPT | Mod: ,,, | Performed by: FAMILY MEDICINE

## 2025-08-13 PROCEDURE — 1160F RVW MEDS BY RX/DR IN RCRD: CPT | Mod: ,,, | Performed by: FAMILY MEDICINE

## 2025-08-13 PROCEDURE — 3008F BODY MASS INDEX DOCD: CPT | Mod: ,,, | Performed by: FAMILY MEDICINE

## 2025-08-13 RX ORDER — AMLODIPINE BESYLATE 10 MG/1
10 TABLET ORAL DAILY
Qty: 90 TABLET | Refills: 1 | Status: SHIPPED | OUTPATIENT
Start: 2025-08-13 | End: 2026-08-13

## 2025-08-13 RX ORDER — LISINOPRIL 40 MG/1
40 TABLET ORAL DAILY
Qty: 90 TABLET | Refills: 1 | Status: SHIPPED | OUTPATIENT
Start: 2025-08-13

## 2025-08-13 RX ORDER — HYDROCHLOROTHIAZIDE 25 MG/1
25 TABLET ORAL DAILY
Qty: 90 TABLET | Refills: 1 | Status: SHIPPED | OUTPATIENT
Start: 2025-08-13

## 2025-08-13 RX ORDER — LEVOTHYROXINE SODIUM 100 UG/1
100 TABLET ORAL
Qty: 90 TABLET | Refills: 1 | Status: SHIPPED | OUTPATIENT
Start: 2025-08-13 | End: 2026-08-13

## 2025-08-25 ENCOUNTER — OFFICE VISIT (OUTPATIENT)
Dept: CARDIOLOGY | Facility: CLINIC | Age: 60
End: 2025-08-25
Payer: MEDICARE

## 2025-08-25 VITALS
HEART RATE: 76 BPM | HEIGHT: 67 IN | DIASTOLIC BLOOD PRESSURE: 78 MMHG | WEIGHT: 211 LBS | SYSTOLIC BLOOD PRESSURE: 132 MMHG | OXYGEN SATURATION: 99 % | BODY MASS INDEX: 33.12 KG/M2

## 2025-08-25 DIAGNOSIS — I10 ESSENTIAL HYPERTENSION: Primary | Chronic | ICD-10-CM

## 2025-08-25 DIAGNOSIS — R06.02 SOB (SHORTNESS OF BREATH) ON EXERTION: ICD-10-CM

## 2025-08-25 DIAGNOSIS — N18.32 STAGE 3B CHRONIC KIDNEY DISEASE: Chronic | ICD-10-CM

## 2025-08-25 PROCEDURE — 3061F NEG MICROALBUMINURIA REV: CPT | Mod: CPTII,,, | Performed by: NURSE PRACTITIONER

## 2025-08-25 PROCEDURE — 4010F ACE/ARB THERAPY RXD/TAKEN: CPT | Mod: CPTII,,, | Performed by: NURSE PRACTITIONER

## 2025-08-25 PROCEDURE — 3066F NEPHROPATHY DOC TX: CPT | Mod: CPTII,,, | Performed by: NURSE PRACTITIONER

## 2025-08-25 PROCEDURE — 99214 OFFICE O/P EST MOD 30 MIN: CPT | Mod: PBBFAC | Performed by: NURSE PRACTITIONER

## 2025-08-25 PROCEDURE — 99999 PR PBB SHADOW E&M-EST. PATIENT-LVL IV: CPT | Mod: PBBFAC,,, | Performed by: NURSE PRACTITIONER

## 2025-08-25 PROCEDURE — 1159F MED LIST DOCD IN RCRD: CPT | Mod: CPTII,,, | Performed by: NURSE PRACTITIONER

## 2025-08-25 PROCEDURE — 99214 OFFICE O/P EST MOD 30 MIN: CPT | Mod: S$PBB,,, | Performed by: NURSE PRACTITIONER

## 2025-08-25 PROCEDURE — 3008F BODY MASS INDEX DOCD: CPT | Mod: CPTII,,, | Performed by: NURSE PRACTITIONER

## 2025-08-25 PROCEDURE — 3078F DIAST BP <80 MM HG: CPT | Mod: CPTII,,, | Performed by: NURSE PRACTITIONER

## 2025-08-25 PROCEDURE — 3075F SYST BP GE 130 - 139MM HG: CPT | Mod: CPTII,,, | Performed by: NURSE PRACTITIONER

## 2025-09-02 ENCOUNTER — HOSPITAL ENCOUNTER (OUTPATIENT)
Dept: RADIOLOGY | Facility: HOSPITAL | Age: 60
Discharge: HOME | End: 2025-09-02
Attending: FAMILY MEDICINE
Payer: MEDICARE

## 2025-09-02 VITALS — WEIGHT: 211 LBS | HEIGHT: 67 IN | BODY MASS INDEX: 33.12 KG/M2

## 2025-09-02 DIAGNOSIS — Z12.31 OTHER SCREENING MAMMOGRAM: ICD-10-CM

## 2025-09-02 PROCEDURE — 77063 BREAST TOMOSYNTHESIS BI: CPT | Mod: TC

## 2025-09-02 PROCEDURE — 77063 BREAST TOMOSYNTHESIS BI: CPT | Mod: 26,,, | Performed by: RADIOLOGY

## 2025-09-02 PROCEDURE — 77067 SCR MAMMO BI INCL CAD: CPT | Mod: 26,,, | Performed by: RADIOLOGY

## (undated) DEVICE — STOCKINETTE COTTON 4FTX48IN 2-PLY OFF WHITE STERILE

## (undated) DEVICE — SLING ARM VOGUE LARGE W/LOGO

## (undated) DEVICE — SOL IRRIGATION SALINE 0.9% 1000ML BOTTLE

## (undated) DEVICE — GLOVE SURGICAL PROTEXIS PI BLUE SIZE 8.5

## (undated) DEVICE — DRAPE FLUORO C ARM 36X30IN

## (undated) DEVICE — PADDING CAST 2IN STERILE

## (undated) DEVICE — CDS EXTREMITY

## (undated) DEVICE — GLOVE SURGICAL PROTEXIS PI CLASSIC SIZE 6.5

## (undated) DEVICE — BANDAGE ELASTIC 2IN X 5.8YRD NS SELF CLOSE

## (undated) DEVICE — Device

## (undated) DEVICE — GLOVE SURGICAL PROTEXIS PI CLASSIC SIZE 8.0

## (undated) DEVICE — APPLICATOR CHLORAPREP HI-LITE TINTED ORANGE 26ML

## (undated) DEVICE — GOWN SURGICAL STERILE LEVEL 3 / XX-LARGE